# Patient Record
Sex: MALE | Race: BLACK OR AFRICAN AMERICAN | NOT HISPANIC OR LATINO | Employment: UNEMPLOYED | URBAN - METROPOLITAN AREA
[De-identification: names, ages, dates, MRNs, and addresses within clinical notes are randomized per-mention and may not be internally consistent; named-entity substitution may affect disease eponyms.]

---

## 2017-01-24 RX ORDER — PRAVASTATIN SODIUM 20 MG/1
TABLET ORAL
Qty: 30 TABLET | Refills: 12 | Status: SHIPPED | OUTPATIENT
Start: 2017-01-24 | End: 2018-01-24 | Stop reason: SDUPTHER

## 2017-01-24 RX ORDER — AMLODIPINE BESYLATE 10 MG/1
TABLET ORAL
Qty: 30 TABLET | Refills: 12 | Status: SHIPPED | OUTPATIENT
Start: 2017-01-24 | End: 2018-01-24 | Stop reason: SDUPTHER

## 2017-07-14 RX ORDER — VALSARTAN AND HYDROCHLOROTHIAZIDE 320; 25 MG/1; MG/1
TABLET, FILM COATED ORAL
Qty: 30 TABLET | Refills: 2 | Status: SHIPPED | OUTPATIENT
Start: 2017-07-14 | End: 2017-10-12 | Stop reason: SDUPTHER

## 2017-10-13 RX ORDER — VALSARTAN AND HYDROCHLOROTHIAZIDE 320; 25 MG/1; MG/1
TABLET, FILM COATED ORAL
Qty: 30 TABLET | Refills: 1 | Status: SHIPPED | OUTPATIENT
Start: 2017-10-13 | End: 2017-12-21 | Stop reason: SDUPTHER

## 2017-11-30 ENCOUNTER — OFFICE VISIT (OUTPATIENT)
Dept: DERMATOLOGY | Facility: CLINIC | Age: 42
End: 2017-11-30
Payer: COMMERCIAL

## 2017-11-30 DIAGNOSIS — D23.9 DERMATOFIBROMA: ICD-10-CM

## 2017-11-30 DIAGNOSIS — L73.0 ACNE KELOIDALIS NUCHAE: Primary | ICD-10-CM

## 2017-11-30 DIAGNOSIS — L30.9 DERMATITIS: ICD-10-CM

## 2017-11-30 PROCEDURE — 99999 PR PBB SHADOW E&M-EST. PATIENT-LVL II: CPT | Mod: PBBFAC,,, | Performed by: DERMATOLOGY

## 2017-11-30 PROCEDURE — 11901 INJECT SKIN LESIONS >7: CPT | Mod: S$GLB,,, | Performed by: DERMATOLOGY

## 2017-11-30 PROCEDURE — 99202 OFFICE O/P NEW SF 15 MIN: CPT | Mod: 25,S$GLB,, | Performed by: DERMATOLOGY

## 2017-11-30 RX ORDER — FLUOCINONIDE TOPICAL SOLUTION USP, 0.05% 0.5 MG/ML
SOLUTION TOPICAL
Qty: 60 ML | Refills: 3 | Status: SHIPPED | OUTPATIENT
Start: 2017-11-30 | End: 2020-04-14

## 2017-11-30 RX ORDER — DOXYCYCLINE HYCLATE 150 MG/1
TABLET, DELAYED RELEASE ORAL
Qty: 30 TABLET | Refills: 2 | Status: SHIPPED | OUTPATIENT
Start: 2017-11-30 | End: 2019-03-13

## 2017-11-30 NOTE — PROGRESS NOTES
Subjective:       Patient ID:  Deepak Tobias is a 42 y.o. male who presents for No chief complaint on file.    Pt present today for f/u of Acne Keloidalis. Last OV was 5 years ago and tx with ILK.  Was doing well and now flaring. Currently using no tx.   Pt c/o of rash on susan elbows and R hand x years. Itchy. Tx with coconut oil. Pt states it helps.        Review of Systems   Constitutional: Positive for fever. Negative for chills, fatigue and malaise.   Respiratory: Negative for cough.    Skin: Positive for itching and rash.   Hematologic/Lymphatic: Negative for adenopathy.   Allergic/Immunologic: Negative for environmental allergies.        Objective:    Physical Exam   Constitutional: He appears well-developed and well-nourished. No distress.   Neurological: He is alert and oriented to person, place, and time. He is not disoriented.   Psychiatric: He has a normal mood and affect.   Skin:   Areas Examined (abnormalities noted in diagram):   Scalp / Hair Palpated and Inspected  Head / Face Inspection Performed  RUE Inspected  LUE Inspection Performed  Nails and Digits Inspection Performed                       Diagram Legend     Erythematous scaling macule/papule c/w actinic keratosis       Vascular papule c/w angioma      Pigmented verrucoid papule/plaque c/w seborrheic keratosis      Yellow umbilicated papule c/w sebaceous hyperplasia      Irregularly shaped tan macule c/w lentigo     1-2 mm smooth white papules consistent with Milia      Movable subcutaneous cyst with punctum c/w epidermal inclusion cyst      Subcutaneous movable cyst c/w pilar cyst      Firm pink to brown papule c/w dermatofibroma      Pedunculated fleshy papule(s) c/w skin tag(s)      Evenly pigmented macule c/w junctional nevus     Mildly variegated pigmented, slightly irregular-bordered macule c/w mildly atypical nevus      Flesh colored to evenly pigmented papule c/w intradermal nevus       Pink pearly papule/plaque c/w basal cell  carcinoma      Erythematous hyperkeratotic cursted plaque c/w SCC      Surgical scar with no sign of skin cancer recurrence      Open and closed comedones      Inflammatory papules and pustules      Verrucoid papule consistent consistent with wart     Erythematous eczematous patches and plaques     Dystrophic onycholytic nail with subungual debris c/w onychomycosis     Umbilicated papule    Erythematous-base heme-crusted tan verrucoid plaque consistent with inflamed seborrheic keratosis     Erythematous Silvery Scaling Plaque c/w Psoriasis     See annotation      Assessment / Plan:        Acne keloidalis nuchae  -     fluocinonide (LIDEX) 0.05 % external solution; AAA scalp qd and can use on hand and elbows followed by moisturizer prn flare  Dispense: 60 mL; Refill: 3  Intralesional Kenalog 10mg/cc (1.0 cc total) injected into 12 lesions on the occipital scalp  today after obtaining verbal consent including risk of surrounding hypopigmentation. Patient tolerated procedure well.    NDC for Kenalog 10mg/cc:  9834-0629-05      doxycycline (DORYX) 150 MG EC tablet; Sig 1po qd with food and not within 1 hour of lying down  Dispense: 30 tablet; Refill: 2    Discussed benefits and risks of doxycyline therapy including but not limited to GI discomfort, esophageal irritation/ulceration, and increased sun sensitivity. Patient was counseled to take medicine with meals and at least 1 hour before lying down.       Dermatitis  -     fluocinonide (LIDEX) 0.05 % external solution; AAA scalp qd and can use on hand and elbows followed by moisturizer prn flare  Dispense: 60 mL; Refill: 3    Dermatofibroma  Reassurance given to patient. No treatment is necessary.   Treatment of benign, asymptomatic lesions may be considered cosmetic.               Return in about 2 months (around 1/30/2018).

## 2017-12-21 RX ORDER — VALSARTAN AND HYDROCHLOROTHIAZIDE 320; 25 MG/1; MG/1
TABLET, FILM COATED ORAL
Qty: 30 TABLET | Refills: 0 | Status: SHIPPED | OUTPATIENT
Start: 2017-12-21 | End: 2018-01-22 | Stop reason: SDUPTHER

## 2018-01-22 RX ORDER — VALSARTAN AND HYDROCHLOROTHIAZIDE 320; 25 MG/1; MG/1
TABLET, FILM COATED ORAL
Qty: 30 TABLET | Refills: 0 | Status: SHIPPED | OUTPATIENT
Start: 2018-01-22 | End: 2018-02-19 | Stop reason: SDUPTHER

## 2018-01-24 RX ORDER — AMLODIPINE BESYLATE 10 MG/1
TABLET ORAL
Qty: 30 TABLET | Refills: 11 | Status: SHIPPED | OUTPATIENT
Start: 2018-01-24 | End: 2019-02-13 | Stop reason: SDUPTHER

## 2018-01-24 RX ORDER — PRAVASTATIN SODIUM 20 MG/1
TABLET ORAL
Qty: 30 TABLET | Refills: 11 | Status: SHIPPED | OUTPATIENT
Start: 2018-01-24 | End: 2019-02-13 | Stop reason: SDUPTHER

## 2018-01-31 ENCOUNTER — OFFICE VISIT (OUTPATIENT)
Dept: DERMATOLOGY | Facility: CLINIC | Age: 43
End: 2018-01-31
Payer: COMMERCIAL

## 2018-01-31 DIAGNOSIS — L73.0 ACNE KELOIDALIS NUCHAE: Primary | ICD-10-CM

## 2018-01-31 PROCEDURE — 99213 OFFICE O/P EST LOW 20 MIN: CPT | Mod: 25,S$GLB,, | Performed by: DERMATOLOGY

## 2018-01-31 PROCEDURE — 99999 PR PBB SHADOW E&M-EST. PATIENT-LVL II: CPT | Mod: PBBFAC,,, | Performed by: DERMATOLOGY

## 2018-01-31 PROCEDURE — 11900 INJECT SKIN LESIONS </W 7: CPT | Mod: S$GLB,,, | Performed by: DERMATOLOGY

## 2018-01-31 RX ORDER — CLINDAMYCIN PHOSPHATE AND TRETINOIN GEL 1.2%/0.025% 10; .25 MG/G; MG/G
GEL TOPICAL NIGHTLY
Qty: 60 G | Refills: 2 | Status: SHIPPED | OUTPATIENT
Start: 2018-01-31 | End: 2018-03-26 | Stop reason: SDUPTHER

## 2018-01-31 NOTE — PROGRESS NOTES
Subjective:       Patient ID:  Deepak Tobias is a 42 y.o. male who presents for   Chief Complaint   Patient presents with    Keloid     follow up     Patient presents today for follow up of Acne Keloidalis.  He reports that it is improved, no new flares, treated with Lidex solution and Doxycycline. Has been on doxycycline about 2 months.  Did have a 2 weeks break in between      Keloid         Review of Systems   Constitutional: Negative for fever, chills, weight loss, weight gain, fatigue, night sweats and malaise.   Gastrointestinal: Negative for indigestion and Sensitivity to oral antibiotics.   Skin: Positive for rash and tendency to form keloidal scars. Negative for itching, sun sensitivity, daily sunscreen use and recent sunburn.   Hematologic/Lymphatic: Does not bruise/bleed easily.        Objective:    Physical Exam   Constitutional: He appears well-developed and well-nourished. No distress.   Neurological: He is alert and oriented to person, place, and time. He is not disoriented.   Psychiatric: He has a normal mood and affect.   Skin:   Areas Examined (abnormalities noted in diagram):   Scalp / Hair Palpated and Inspected              Diagram Legend     Erythematous scaling macule/papule c/w actinic keratosis       Vascular papule c/w angioma      Pigmented verrucoid papule/plaque c/w seborrheic keratosis      Yellow umbilicated papule c/w sebaceous hyperplasia      Irregularly shaped tan macule c/w lentigo     1-2 mm smooth white papules consistent with Milia      Movable subcutaneous cyst with punctum c/w epidermal inclusion cyst      Subcutaneous movable cyst c/w pilar cyst      Firm pink to brown papule c/w dermatofibroma      Pedunculated fleshy papule(s) c/w skin tag(s)      Evenly pigmented macule c/w junctional nevus     Mildly variegated pigmented, slightly irregular-bordered macule c/w mildly atypical nevus      Flesh colored to evenly pigmented papule c/w intradermal nevus       Pink  pearly papule/plaque c/w basal cell carcinoma      Erythematous hyperkeratotic cursted plaque c/w SCC      Surgical scar with no sign of skin cancer recurrence      Open and closed comedones      Inflammatory papules and pustules      Verrucoid papule consistent consistent with wart     Erythematous eczematous patches and plaques     Dystrophic onycholytic nail with subungual debris c/w onychomycosis     Umbilicated papule    Erythematous-base heme-crusted tan verrucoid plaque consistent with inflamed seborrheic keratosis     Erythematous Silvery Scaling Plaque c/w Psoriasis     See annotation      Assessment / Plan:        Acne keloidalis nuchae  -     clindamycin-tretinoin (ZIANA) gel; Apply topically nightly. Apply thin film to back of neck every other night  Dispense: 60 g; Refill: 2  D/c doxycycline after 2 more weeks  Continue lidex solution  And now alternate with ziana    Intralesional Kenalog 10mg/cc (0.7 cc total) injected into 5 lesions on the occipital scalp today after obtaining verbal consent including risk of surrounding hypopigmentation. Patient tolerated procedure well.      NDC for Kenalog 10mg/cc:  8372-8751-50                   Follow-up in about 2 months (around 3/31/2018).

## 2018-02-01 ENCOUNTER — PATIENT MESSAGE (OUTPATIENT)
Dept: DERMATOLOGY | Facility: CLINIC | Age: 43
End: 2018-02-01

## 2018-02-01 RX ORDER — CLINDAMYCIN PHOSPHATE AND TRETINOIN GEL 1.2%/0.025% 10; .25 MG/G; MG/G
GEL TOPICAL NIGHTLY
Qty: 60 G | Refills: 2 | Status: SHIPPED | OUTPATIENT
Start: 2018-02-01 | End: 2019-03-13

## 2018-02-06 ENCOUNTER — OFFICE VISIT (OUTPATIENT)
Dept: INTERNAL MEDICINE | Facility: CLINIC | Age: 43
End: 2018-02-06
Payer: COMMERCIAL

## 2018-02-06 VITALS
DIASTOLIC BLOOD PRESSURE: 84 MMHG | WEIGHT: 279.75 LBS | BODY MASS INDEX: 42.4 KG/M2 | HEART RATE: 57 BPM | HEIGHT: 68 IN | SYSTOLIC BLOOD PRESSURE: 122 MMHG

## 2018-02-06 DIAGNOSIS — E78.5 HYPERLIPIDEMIA, UNSPECIFIED HYPERLIPIDEMIA TYPE: ICD-10-CM

## 2018-02-06 DIAGNOSIS — G47.30 SLEEP APNEA, UNSPECIFIED TYPE: Primary | ICD-10-CM

## 2018-02-06 DIAGNOSIS — E11.9 TYPE 2 DIABETES MELLITUS WITHOUT COMPLICATION, WITHOUT LONG-TERM CURRENT USE OF INSULIN: ICD-10-CM

## 2018-02-06 DIAGNOSIS — I10 ESSENTIAL HYPERTENSION: ICD-10-CM

## 2018-02-06 PROCEDURE — 3008F BODY MASS INDEX DOCD: CPT | Mod: S$GLB,,, | Performed by: INTERNAL MEDICINE

## 2018-02-06 PROCEDURE — 99214 OFFICE O/P EST MOD 30 MIN: CPT | Mod: S$GLB,,, | Performed by: INTERNAL MEDICINE

## 2018-02-06 PROCEDURE — 99999 PR PBB SHADOW E&M-EST. PATIENT-LVL III: CPT | Mod: PBBFAC,,, | Performed by: INTERNAL MEDICINE

## 2018-02-06 NOTE — PROGRESS NOTES
Subjective     Chief Complaint:    History of Present Illness:  Mr. Deepak Tobias is a 42 y.o. male with HTN on Diovan and Norvasc, HLD on Pravastatin, SUPRIYA on CPAP who is here for yearly follow up. Patient is dong well. HTN optimal with current regimen. /84 in office today. No symptoms, no sob, LOAIZA, chest pain, palpitations, LE edema. HLD, last lipids unremarkable. Continue Pravastatin. Will get lipids panel today. Patient still uses CPAP, would like to get a new one.  He has been on CPAP since 2006. Works well for him. He otherwise has no other complaints. Does not smoke, occasional alcohol use.      Review of Systems   Constitutional: Negative for chills and fever.   HENT: Negative for congestion, sinus pain and sore throat.    Eyes: Negative for blurred vision and double vision.   Respiratory: Negative for cough, shortness of breath and wheezing.    Cardiovascular: Negative for chest pain, palpitations and leg swelling.   Gastrointestinal: Negative for abdominal pain, blood in stool, constipation and diarrhea.   Genitourinary: Negative for frequency, hematuria and urgency.   Musculoskeletal: Negative for back pain, myalgias and neck pain.   Skin: Negative for itching and rash.   Neurological: Negative for dizziness, focal weakness, seizures, weakness and headaches.       PAST HISTORY:     Past Medical History:   Diagnosis Date    Hyperlipidemia     Hypertension     Obesity     Sleep apnea        Past Surgical History:   Procedure Laterality Date    achilies tendon         Family History   Problem Relation Age of Onset    Hypertension Father     Acne Neg Hx     Melanoma Neg Hx        Social History     Social History    Marital status:      Spouse name: N/A    Number of children: N/A    Years of education: N/A     Occupational History     Clear Result     Social History Main Topics    Smoking status: Never Smoker    Smokeless tobacco: Never Used    Alcohol use No    Drug use: No     "Sexual activity: Yes     Partners: Female     Other Topics Concern    None     Social History Narrative    None       MEDICATIONS & ALLERGIES:     Current Outpatient Prescriptions on File Prior to Visit   Medication Sig    amLODIPine (NORVASC) 10 MG tablet TAKE ONE TABLET BY MOUTH DAILY    doxycycline (DORYX) 150 MG EC tablet Sig 1po qd with food and not within 1 hour of lying down    fluocinonide (LIDEX) 0.05 % external solution AAA scalp qd and can use on hand and elbows followed by moisturizer prn flare    pravastatin (PRAVACHOL) 20 MG tablet TAKE ONE TABLET BY MOUTH DAILY    valsartan-hydrochlorothiazide (DIOVAN-HCT) 320-25 mg per tablet TAKE ONE TABLET BY MOUTH DAILY    clindamycin-tretinoin (ZIANA) gel Apply topically nightly. Apply thin film to back of neck every other night    clindamycin-tretinoin (ZIANA) gel Apply topically nightly. Apply thin film to neck qhs     No current facility-administered medications on file prior to visit.        Review of patient's allergies indicates:   Allergen Reactions    Keflex [cephalexin] Rash       OBJECTIVE:     Vital Signs:  Vitals:    02/06/18 0904   BP: 122/84   BP Location: Right arm   Patient Position: Sitting   BP Method: Large (Manual)   Pulse: (!) 57   Weight: 126.9 kg (279 lb 12.2 oz)   Height: 5' 8" (1.727 m)       Body mass index is 42.54 kg/m².     Physical Exam:  General:  Well developed, well nourished, no acute distress  Head: Normocephalic, atraumatic  Eyes: PERRL, EOMI, clear sclera  Throat: No posterior pharyngeal erythema or exudate, no tonsillar exudate  Neck: supple, normal ROM, no thyromegaly   CVS:  RRR, S1 and S2 normal, no murmurs, rubs, gallops, 2+ peripheral pulses  Resp:  Lungs clear to auscultation, no wheezes, rales, rhonchi, cough  GI:  Abdomen soft, non-tender, non-distended, normoactive bowel sounds  MSK:  No muscle atrophy, cyanosis, peripheral edema   Skin:  No rashes, ulcers, erythema  Neuro:  CNII-XII grossly intact, no " focal deficits noted  Psych:  Appropriate mood and affect, normal judgement    Laboratory  No results found for: WBC, HGB, HCT, MCV, PLT  @BDITCCKNP56(GLU,NA,K,Cl,CO2,BUN,Creatinine,Calcium,MG)@  No results found for: INR, PROTIME  Lab Results   Component Value Date    HGBA1C 5.6 08/04/2016     No results for input(s): POCTGLUCOSE in the last 72 hours.    ASSESSMENT & PLAN:   Mr. Deepak Tobias is a 42 y.o. male    Deepak was seen today for medicare awv follow up.    Diagnoses and all orders for this visit:    Sleep apnea, unspecified type  -     CPAP FOR HOME USE  -     No LE edema, no headaches. C/W cpap  -     Re-assess for sleep apnea, f/u with sleep study clinic     Type 2 diabetes mellitus without complication, without long-term current use of insulin  -     Comprehensive metabolic panel; Future  -     Hemoglobin A1c; Future  -     Impaired fasting glucose on last lab, recent A1c 5.6. No macro/microvascular complications.    Essential hypertension        -     Well controlled on Diovan and Norvasc        -     In the 120's today, c/w current regimen     Hyperlipidemia, unspecified hyperlipidemia type  -     Lipid panel; Future  -     ASCVD score 5.5%, will check lipids. Obesity BMI 42.54    Class 3 obesity with body mass index (BMI) of 40.0 to 44.9 in adult, unspecified obesity type, unspecified whether serious comorbidity present        -      BMI 42.5.         -      Encouraging exercise and healthy diet.     RTC in 8 months.    Discussed with Dr. Alexander - staff attestation to follow

## 2018-02-06 NOTE — PROGRESS NOTES
I have personally taken the history and examined this patient and agree with the resident's note as stated above with the following thoughts:    Has had sleep apnea for 10 years on CPAP. Will order replacement cpap- his study is 10 year old- so we may need to have  Will follow up with Sleep clinic to evaluate since he has lost some weight and get new device.

## 2018-02-20 RX ORDER — VALSARTAN AND HYDROCHLOROTHIAZIDE 320; 25 MG/1; MG/1
TABLET, FILM COATED ORAL
Qty: 90 TABLET | Refills: 3 | Status: SHIPPED | OUTPATIENT
Start: 2018-02-20 | End: 2019-02-13 | Stop reason: SDUPTHER

## 2018-02-27 ENCOUNTER — TELEPHONE (OUTPATIENT)
Dept: INTERNAL MEDICINE | Facility: CLINIC | Age: 43
End: 2018-02-27

## 2018-02-27 NOTE — TELEPHONE ENCOUNTER
----- Message from Keesha Liang sent at 2/26/2018  4:19 PM CST -----  Contact: Branch/Wife/[phone]  Pt's wife is calling to speak with someone in the office. Please call and advise.        Thank You

## 2018-02-27 NOTE — TELEPHONE ENCOUNTER
----- Message from Lisa Elmore sent at 2/27/2018  3:11 PM CST -----  Contact: Spouse [phone]  Patient is returning a missed call.    Please call and advise.    Thank you

## 2018-03-26 ENCOUNTER — OFFICE VISIT (OUTPATIENT)
Dept: DERMATOLOGY | Facility: CLINIC | Age: 43
End: 2018-03-26
Payer: COMMERCIAL

## 2018-03-26 DIAGNOSIS — L73.0 ACNE KELOIDALIS NUCHAE: ICD-10-CM

## 2018-03-26 DIAGNOSIS — L30.9 DERMATITIS: Primary | ICD-10-CM

## 2018-03-26 PROCEDURE — 99213 OFFICE O/P EST LOW 20 MIN: CPT | Mod: S$GLB,,, | Performed by: DERMATOLOGY

## 2018-03-26 PROCEDURE — 99999 PR PBB SHADOW E&M-EST. PATIENT-LVL II: CPT | Mod: PBBFAC,,, | Performed by: DERMATOLOGY

## 2018-03-26 RX ORDER — CLINDAMYCIN PHOSPHATE AND TRETINOIN GEL 1.2%/0.025% 10; .25 MG/G; MG/G
GEL TOPICAL NIGHTLY
Qty: 60 G | Refills: 2 | Status: SHIPPED | OUTPATIENT
Start: 2018-03-26 | End: 2019-03-13

## 2018-03-26 NOTE — PROGRESS NOTES
Subjective:       Patient ID:  Deepak Tobias is a 42 y.o. male who presents for   Chief Complaint   Patient presents with    Keloid     Pt here today for a follow up on Acne keloidalis nuchae tx with ziana gel, lidex solution and ILK 10 mm/cc. Tx help  Pt feels that when he gets new lesions they do go away more quickly.   Also has scaly area on right 5th knuckle.  Uses ziana and lidex solution.  Still dry and irritated.        Keloid         Review of Systems   Skin: Positive for tendency to form keloidal scars.   Hematologic/Lymphatic: Does not bruise/bleed easily.        Objective:    Physical Exam   Constitutional: He appears well-developed and well-nourished. No distress.   Neurological: He is alert and oriented to person, place, and time. He is not disoriented.   Psychiatric: He has a normal mood and affect.   Skin:   Areas Examined (abnormalities noted in diagram):   Scalp / Hair Palpated and Inspected  RUE Inspected                  Diagram Legend     Erythematous scaling macule/papule c/w actinic keratosis       Vascular papule c/w angioma      Pigmented verrucoid papule/plaque c/w seborrheic keratosis      Yellow umbilicated papule c/w sebaceous hyperplasia      Irregularly shaped tan macule c/w lentigo     1-2 mm smooth white papules consistent with Milia      Movable subcutaneous cyst with punctum c/w epidermal inclusion cyst      Subcutaneous movable cyst c/w pilar cyst      Firm pink to brown papule c/w dermatofibroma      Pedunculated fleshy papule(s) c/w skin tag(s)      Evenly pigmented macule c/w junctional nevus     Mildly variegated pigmented, slightly irregular-bordered macule c/w mildly atypical nevus      Flesh colored to evenly pigmented papule c/w intradermal nevus       Pink pearly papule/plaque c/w basal cell carcinoma      Erythematous hyperkeratotic cursted plaque c/w SCC      Surgical scar with no sign of skin cancer recurrence      Open and closed comedones      Inflammatory  papules and pustules      Verrucoid papule consistent consistent with wart     Erythematous eczematous patches and plaques     Dystrophic onycholytic nail with subungual debris c/w onychomycosis     Umbilicated papule    Erythematous-base heme-crusted tan verrucoid plaque consistent with inflamed seborrheic keratosis     Erythematous Silvery Scaling Plaque c/w Psoriasis     See annotation      Assessment / Plan:        Dermatitis  Lidex solution then moisturizing cream     Acne keloidalis nuchae  Lidex and ziana gel to periphery of scar   Will call in oral doxy prn flare.   -     clindamycin-tretinoin (ZIANA) gel; Apply topically nightly. Apply thin film to back of neck every other night  Dispense: 60 g; Refill: 2             Follow-up if symptoms worsen or fail to improve.

## 2018-03-28 DIAGNOSIS — E11.9 TYPE 2 DIABETES MELLITUS WITHOUT COMPLICATION: ICD-10-CM

## 2018-06-14 ENCOUNTER — TELEPHONE (OUTPATIENT)
Dept: INTERNAL MEDICINE | Facility: CLINIC | Age: 43
End: 2018-06-14

## 2018-06-14 NOTE — TELEPHONE ENCOUNTER
----- Message from Kurt Jain sent at 6/14/2018  3:41 PM CDT -----  Contact: Spouse/Bree [phone]  The patient's wife said that [facility] need a copy of the sleep study to go along with the CPAP prescription that you sent to them.    Thank you

## 2018-07-26 RX ORDER — FLUOCINONIDE TOPICAL SOLUTION USP, 0.05% 0.5 MG/ML
SOLUTION TOPICAL
Qty: 60 ML | Refills: 2 | Status: SHIPPED | OUTPATIENT
Start: 2018-07-26 | End: 2019-03-13

## 2018-11-30 ENCOUNTER — TELEPHONE (OUTPATIENT)
Dept: INTERNAL MEDICINE | Facility: CLINIC | Age: 43
End: 2018-11-30

## 2018-11-30 NOTE — TELEPHONE ENCOUNTER
----- Message from Pratibha Mckeon sent at 11/30/2018  2:24 PM CST -----  Contact: [facility]/ [phone]  Company calling to speak with someone in regards to some orders that were sent over for a C-Pap machine and supplies. She states that it was sent over on 11/20/18. Please call back and advise.      Thanks

## 2018-12-03 ENCOUNTER — TELEPHONE (OUTPATIENT)
Dept: INTERNAL MEDICINE | Facility: CLINIC | Age: 43
End: 2018-12-03

## 2018-12-03 NOTE — TELEPHONE ENCOUNTER
----- Message from Santana Caceres sent at 12/3/2018  2:59 PM CST -----  Contact: Marco Antonio Sweet [phone]  3rd Attempt    Marco Antonio Chacko calling to check the status of the previous messages, requesting for a response back from office.    Stating is urgent and is date specific, stating third attempt, would like to assist patient with getting the equipment and supplies that's needed before request closes.    Please call an advise  Thank you

## 2018-12-06 ENCOUNTER — TELEPHONE (OUTPATIENT)
Dept: INTERNAL MEDICINE | Facility: CLINIC | Age: 43
End: 2018-12-06

## 2018-12-06 NOTE — TELEPHONE ENCOUNTER
----- Message from Mariah Pinto sent at 12/6/2018 10:49 AM CST -----  Contact: Marco Antonio Steen [phone]  Pharmacy is calling to clarify an RX.  RX name:  CPAP Machine  What do they need to clarify:  CPAP Settings  Comments: Caller states they received 2 orders/ scripts with different settings.

## 2019-02-15 RX ORDER — VALSARTAN AND HYDROCHLOROTHIAZIDE 320; 25 MG/1; MG/1
TABLET, FILM COATED ORAL
Qty: 30 TABLET | Refills: 2 | Status: SHIPPED | OUTPATIENT
Start: 2019-02-15 | End: 2019-06-01 | Stop reason: SDUPTHER

## 2019-02-15 RX ORDER — PRAVASTATIN SODIUM 20 MG/1
TABLET ORAL
Qty: 30 TABLET | Refills: 2 | Status: SHIPPED | OUTPATIENT
Start: 2019-02-15 | End: 2019-05-19 | Stop reason: SDUPTHER

## 2019-02-15 RX ORDER — AMLODIPINE BESYLATE 10 MG/1
TABLET ORAL
Qty: 30 TABLET | Refills: 2 | Status: SHIPPED | OUTPATIENT
Start: 2019-02-15 | End: 2019-05-19 | Stop reason: SDUPTHER

## 2019-03-13 ENCOUNTER — OFFICE VISIT (OUTPATIENT)
Dept: INTERNAL MEDICINE | Facility: CLINIC | Age: 44
End: 2019-03-13
Payer: COMMERCIAL

## 2019-03-13 ENCOUNTER — LAB VISIT (OUTPATIENT)
Dept: LAB | Facility: HOSPITAL | Age: 44
End: 2019-03-13
Attending: INTERNAL MEDICINE
Payer: COMMERCIAL

## 2019-03-13 VITALS
BODY MASS INDEX: 42.7 KG/M2 | OXYGEN SATURATION: 98 % | HEART RATE: 65 BPM | SYSTOLIC BLOOD PRESSURE: 118 MMHG | WEIGHT: 281.75 LBS | HEIGHT: 68 IN | DIASTOLIC BLOOD PRESSURE: 78 MMHG

## 2019-03-13 DIAGNOSIS — E11.9 TYPE 2 DIABETES MELLITUS WITHOUT COMPLICATION, WITHOUT LONG-TERM CURRENT USE OF INSULIN: Primary | ICD-10-CM

## 2019-03-13 DIAGNOSIS — E11.9 TYPE 2 DIABETES MELLITUS WITHOUT COMPLICATION, WITHOUT LONG-TERM CURRENT USE OF INSULIN: ICD-10-CM

## 2019-03-13 DIAGNOSIS — E78.5 HYPERLIPIDEMIA, UNSPECIFIED HYPERLIPIDEMIA TYPE: ICD-10-CM

## 2019-03-13 DIAGNOSIS — I10 ESSENTIAL HYPERTENSION: ICD-10-CM

## 2019-03-13 DIAGNOSIS — G47.30 SLEEP APNEA, UNSPECIFIED TYPE: ICD-10-CM

## 2019-03-13 DIAGNOSIS — E66.01 CLASS 3 SEVERE OBESITY WITH SERIOUS COMORBIDITY AND BODY MASS INDEX (BMI) OF 40.0 TO 44.9 IN ADULT, UNSPECIFIED OBESITY TYPE: ICD-10-CM

## 2019-03-13 LAB
ALBUMIN SERPL BCP-MCNC: 4.2 G/DL
ALP SERPL-CCNC: 51 U/L
ALT SERPL W/O P-5'-P-CCNC: 33 U/L
ANION GAP SERPL CALC-SCNC: 9 MMOL/L
AST SERPL-CCNC: 29 U/L
BILIRUB SERPL-MCNC: 0.8 MG/DL
BUN SERPL-MCNC: 16 MG/DL
CALCIUM SERPL-MCNC: 9.9 MG/DL
CHLORIDE SERPL-SCNC: 103 MMOL/L
CHOLEST SERPL-MCNC: 195 MG/DL
CHOLEST/HDLC SERPL: 4.9 {RATIO}
CO2 SERPL-SCNC: 29 MMOL/L
CREAT SERPL-MCNC: 1.3 MG/DL
EST. GFR  (AFRICAN AMERICAN): >60 ML/MIN/1.73 M^2
EST. GFR  (NON AFRICAN AMERICAN): >60 ML/MIN/1.73 M^2
ESTIMATED AVG GLUCOSE: 148 MG/DL
GLUCOSE SERPL-MCNC: 162 MG/DL
HBA1C MFR BLD HPLC: 6.8 %
HDLC SERPL-MCNC: 40 MG/DL
HDLC SERPL: 20.5 %
LDLC SERPL CALC-MCNC: 126.2 MG/DL
NONHDLC SERPL-MCNC: 155 MG/DL
POTASSIUM SERPL-SCNC: 3.5 MMOL/L
PROT SERPL-MCNC: 7.8 G/DL
SODIUM SERPL-SCNC: 141 MMOL/L
TRIGL SERPL-MCNC: 144 MG/DL

## 2019-03-13 PROCEDURE — 83036 HEMOGLOBIN GLYCOSYLATED A1C: CPT

## 2019-03-13 PROCEDURE — 3078F PR MOST RECENT DIASTOLIC BLOOD PRESSURE < 80 MM HG: ICD-10-PCS | Mod: CPTII,S$GLB,, | Performed by: INTERNAL MEDICINE

## 2019-03-13 PROCEDURE — 3008F BODY MASS INDEX DOCD: CPT | Mod: CPTII,S$GLB,, | Performed by: INTERNAL MEDICINE

## 2019-03-13 PROCEDURE — 80053 COMPREHEN METABOLIC PANEL: CPT

## 2019-03-13 PROCEDURE — 80061 LIPID PANEL: CPT

## 2019-03-13 PROCEDURE — 3044F PR MOST RECENT HEMOGLOBIN A1C LEVEL <7.0%: ICD-10-PCS | Mod: CPTII,S$GLB,, | Performed by: INTERNAL MEDICINE

## 2019-03-13 PROCEDURE — 3074F PR MOST RECENT SYSTOLIC BLOOD PRESSURE < 130 MM HG: ICD-10-PCS | Mod: CPTII,S$GLB,, | Performed by: INTERNAL MEDICINE

## 2019-03-13 PROCEDURE — 3074F SYST BP LT 130 MM HG: CPT | Mod: CPTII,S$GLB,, | Performed by: INTERNAL MEDICINE

## 2019-03-13 PROCEDURE — 3078F DIAST BP <80 MM HG: CPT | Mod: CPTII,S$GLB,, | Performed by: INTERNAL MEDICINE

## 2019-03-13 PROCEDURE — 36415 COLL VENOUS BLD VENIPUNCTURE: CPT

## 2019-03-13 PROCEDURE — 3044F HG A1C LEVEL LT 7.0%: CPT | Mod: CPTII,S$GLB,, | Performed by: INTERNAL MEDICINE

## 2019-03-13 PROCEDURE — 99214 PR OFFICE/OUTPT VISIT, EST, LEVL IV, 30-39 MIN: ICD-10-PCS | Mod: S$GLB,,, | Performed by: INTERNAL MEDICINE

## 2019-03-13 PROCEDURE — 99214 OFFICE O/P EST MOD 30 MIN: CPT | Mod: S$GLB,,, | Performed by: INTERNAL MEDICINE

## 2019-03-13 PROCEDURE — 99999 PR PBB SHADOW E&M-EST. PATIENT-LVL IV: ICD-10-PCS | Mod: PBBFAC,,, | Performed by: INTERNAL MEDICINE

## 2019-03-13 PROCEDURE — 3008F PR BODY MASS INDEX (BMI) DOCUMENTED: ICD-10-PCS | Mod: CPTII,S$GLB,, | Performed by: INTERNAL MEDICINE

## 2019-03-13 PROCEDURE — 99999 PR PBB SHADOW E&M-EST. PATIENT-LVL IV: CPT | Mod: PBBFAC,,, | Performed by: INTERNAL MEDICINE

## 2019-03-13 NOTE — PROGRESS NOTES
"Subjective:  HISTORY OF PRESENT ILLNESS:    Mr. Tobias is a 42 YO male here for a follow up appointment.    Hyperlipidemia:   Hyperlipedima controlled, patient is adherent to Pravastatin, last lab 2/6/18 showed it was well controlled. Patient stated no side effects from medication.    Hypertension:  BP controlled, patient adherent to valsartan-hydrochlorothiazide and amlodipine. Patient stated no side effects from medication.    Obesity:  Patient weight 282 lb , 1 year ago 280 lb. ,BMI 42.84    Sleep Apnea:  Received new Machine in December, is using machine with no complaints.    Type 2 Diabetes:  Need to check A1C lab values, last from 2/6/18 was 5.9.  Performed diabetic foot exam and referred for eye exam. Patient stated no side effects from medication.    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     For all the issues above Patient has made change to increase exercise since January, and a diet of less meats and carbs, more fruits and vegetables.     Review of Systems   Constitutional: Negative for chills, fever, malaise/fatigue and weight loss.   Respiratory: Negative.  Negative for cough, hemoptysis, shortness of breath and wheezing.    Cardiovascular: Negative for chest pain, palpitations, orthopnea, claudication and leg swelling.   Gastrointestinal: Negative for constipation, diarrhea and nausea.   Genitourinary: Negative for dysuria, frequency, hematuria and urgency.   Neurological: Negative for weakness.     Objective:  /78 (BP Location: Right arm, Patient Position: Sitting, BP Method: Large (Manual))   Pulse 65   Ht 5' 8" (1.727 m)   Wt 127.8 kg (281 lb 12 oz)   SpO2 98%   BMI 42.84 kg/m²     PHYSICAL EXAMINATION:  GENERAL:  He is a well-appearing obese -American gentleman in no acute   distress.  NECK:  Supple.  CHEST:  Clear without wheeze.  CARDIOVASCULAR:  S1 and S2, regular rate and " rhythm without murmur, gallop or   rub.  ABDOMEN:  Soft, nontender, no hepatosplenomegaly, no guarding or rebound   tenderness.  LOWER EXTREMITIES:  No edema. Dorsalis pedis pulse, strong, bilateral, sensation present to monofilament.   HEENT:   Mild gingival hyperplasia at the bottom of his teeth.       Current Outpatient Medications:     amLODIPine (NORVASC) 10 MG tablet, TAKE ONE TABLET BY MOUTH DAILY, Disp: 30 tablet, Rfl: 2    fluocinonide (LIDEX) 0.05 % external solution, AAA scalp qd and can use on hand and elbows followed by moisturizer prn flare, Disp: 60 mL, Rfl: 3    pravastatin (PRAVACHOL) 20 MG tablet, TAKE ONE TABLET BY MOUTH DAILY, Disp: 30 tablet, Rfl: 2    valsartan-hydrochlorothiazide (DIOVAN-HCT) 320-25 mg per tablet, TAKE ONE TABLET BY MOUTH DAILY, Disp: 30 tablet, Rfl: 2      ASSESSMENT AND PLAN:      Hyperlipidemia:   Hyperlipedima controlled, patient is adherent to Pravastatin. Follow up with labs from today and again in 6 months.     Hypertension:  BP controlled, patient adherent to valsartan-hydrochlorothiazide and amlodipine. Follow up with labs from today and again in 6 months.     Obesity:  Patient weight 282 lb , 1 year ago 280 lb. ,BMI 42.84. Continue to monitor as patient continues to exercise and diet, offer a referral to a dietitian or  if necessary at next appointment.     Sleep Apnea:  Received new Machine in December, is using machine with no complaints. Ask at follow up appointment if any changes.    Type 2 Diabetes:  Need to check A1C lab values, last from 2/6/18 was 5.9. Follow up with labs from today and again in 6 months. Follow up if patient had eye exam.    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 Take a picture for records to compare in the next 6 months, continue to monitor.     Admin:  Patient mentioned last year sister was diagnosed with intestinal cancer.    Other family history revealed father has leukemia, mother has sarcoidosis, brother has b12 deficiency.  Update in chart

## 2019-03-13 NOTE — MEDICAL/APP STUDENT
"Subjective:  HISTORY OF PRESENT ILLNESS:    Mr. Tobias is a 44 YO male here for a follow up appointment.    Hyperlipidemia:   Hyperlipedima controlled, patient is adherent to Pravastatin, last lab 2/6/18 showed it was well controlled. Patient stated no side effects from medication.    Hypertension:  BP controlled, patient adherent to valsartan-hydrochlorothiazide and amlodipine. Patient stated no side effects from medication.    Obesity:  Patient weight 282 lb , 1 year ago 280 lb. ,BMI 42.84    Sleep Apnea:  Received new Machine in December, is using machine with no complaints.    Type 2 Diabetes:  Need to check A1C lab values, last from 2/6/18 was 5.9.  Performed diabetic foot exam and referred for eye exam. Patient stated no side effects from medication.    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     For all the issues above Patient has made change to increase exercise since January, and a diet of less meats and carbs, more fruits and vegetables.     Review of Systems   Constitutional: Negative for chills, fever, malaise/fatigue and weight loss.   Respiratory: Negative.  Negative for cough, hemoptysis, shortness of breath and wheezing.    Cardiovascular: Negative for chest pain, palpitations, orthopnea, claudication and leg swelling.   Gastrointestinal: Negative for constipation, diarrhea and nausea.   Genitourinary: Negative for dysuria, frequency, hematuria and urgency.   Neurological: Negative for weakness.     Objective:  /78 (BP Location: Right arm, Patient Position: Sitting, BP Method: Large (Manual))   Pulse 65   Ht 5' 8" (1.727 m)   Wt 127.8 kg (281 lb 12 oz)   SpO2 98%   BMI 42.84 kg/m²     PHYSICAL EXAMINATION:  GENERAL:  He is a well-appearing obese -American gentleman in no acute   distress.  NECK:  Supple.  CHEST:  Clear without wheeze.  CARDIOVASCULAR:  S1 and S2, regular rate and " rhythm without murmur, gallop or   rub.  ABDOMEN:  Soft, nontender, no hepatosplenomegaly, no guarding or rebound   tenderness.  LOWER EXTREMITIES:  No edema. Dorsalis pedis pulse, strong, bilateral, sensation present to monofilament.   HEENT:   Mild gingival hyperplasia at the bottom of his teeth.       Current Outpatient Medications:     amLODIPine (NORVASC) 10 MG tablet, TAKE ONE TABLET BY MOUTH DAILY, Disp: 30 tablet, Rfl: 2    fluocinonide (LIDEX) 0.05 % external solution, AAA scalp qd and can use on hand and elbows followed by moisturizer prn flare, Disp: 60 mL, Rfl: 3    pravastatin (PRAVACHOL) 20 MG tablet, TAKE ONE TABLET BY MOUTH DAILY, Disp: 30 tablet, Rfl: 2    valsartan-hydrochlorothiazide (DIOVAN-HCT) 320-25 mg per tablet, TAKE ONE TABLET BY MOUTH DAILY, Disp: 30 tablet, Rfl: 2      ASSESSMENT AND PLAN:      Hyperlipidemia:   Hyperlipedima controlled, patient is adherent to Pravastatin. Follow up with labs from today and again in 6 months.     Hypertension:  BP controlled, patient adherent to valsartan-hydrochlorothiazide and amlodipine. Follow up with labs from today and again in 6 months.     Obesity:  Patient weight 282 lb , 1 year ago 280 lb. ,BMI 42.84. Continue to monitor as patient continues to exercise and diet, offer a referral to a dietitian or  if necessary at next appointment.     Sleep Apnea:  Received new Machine in December, is using machine with no complaints. Ask at follow up appointment if any changes.    Type 2 Diabetes:  Need to check A1C lab values, last from 2/6/18 was 5.9. Follow up with labs from today and again in 6 months. Follow up if patient had eye exam.    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 Take a picture for records to compare in the next 6 months, continue to monitor.     Admin:  Patient mentioned last year sister was diagnosed with intestinal cancer.    Other family history revealed father has leukemia, mother has sarcoidosis, brother has b12 deficiency.  Update in chart

## 2019-04-09 NOTE — PROGRESS NOTES
HISTORY OF PRESENT ILLNESS:  He is a 43-year-old gentleman coming in to followup   ongoing medical problems.  1.  He has hyperlipidemia.  He is taking pravastatin.  He has had hyperlipidemia   for several years.  His last labs were back in 2018 around February and he is   not having any trouble with the medication.  2.  He has hypertension, which he has also had for multiple years.  He is on   losartan and hydrochlorothiazide combination and amlodipine.  Blood pressure   today is 118/78.  He is not having headaches or chest pain.  No shortness of   breath.  He also suffers from obesity.  He is weighing 282 today.  BMI is 42.    He has also had some hyperglycemia just in the diabetic range.  Last hemoglobin   A1c was last year at 5.9.  He denies any polyuria or polydipsia.  Not taking any   medication for this.  It has been diet controlled.  3.  He has some gingival hyperplasia.  We had taken him off his amlodipine that   really did not seem to affect and we had trouble controlling his blood pressure.    He is back on amlodipine.  4.  Sleep apnea.  He is on CPAP, which he received late last year.  He is using   CPAP without any difficulty.    REVIEW OF SYSTEMS:  No chest pain, shortness of breath, palpitations, nausea,   vomiting, blurriness of vision.  No PND or orthopnea.    PHYSICAL EXAMINATION:  GENERAL:  He is a well-appearing 43-year-old obese -American gentleman in   no acute distress.  NECK:  Supple.  He has no JVD.  Thyroid is not enlarged.  CARDIOVASCULAR:  S1 and S2, regular rate and rhythm without murmur, gallop or   rub.  ABDOMEN:  Soft, obese, nontender, no hepatosplenomegaly.  EXTREMITIES:  Foot exam is documented by the medical student and reviewed by me.    ASSESSMENT:  1.  Hyperlipidemia.  We will check lipid panel.  Continue pravastatin.  I will   see him back in six months.  2.  Hypertension.  Blood pressure is well controlled.  Continue current   medications.  3.  Obesity.  We discussed  this with him.  We are going to work on a low-fat,   low-cholesterol diet and exercise.  4.  Sleep apnea, using a CPAP machine without any difficulty.  I asked him to   give me a call if he needs CPAP supplies and we can arrange that for him.  5.  Diabetes mellitus type 2, which has been diet controlled.  We are going to   check a chemistry, hemoglobin A1c, did a full exam today.  Also, we will set him   up to see an eye doctor.  We will follow him up again in six months depending   on how labs look.  If he has any problems before then, he is going to give me a   call.      ALEXIA  dd: 04/09/2019 08:01:33 (CDT)  td: 04/09/2019 22:43:27 (CDT)  Doc ID   #[id number]  Job ID #[id number]    CC:

## 2019-05-19 RX ORDER — PRAVASTATIN SODIUM 20 MG/1
TABLET ORAL
Qty: 90 TABLET | Refills: 3 | Status: SHIPPED | OUTPATIENT
Start: 2019-05-19 | End: 2020-05-26

## 2019-05-19 RX ORDER — AMLODIPINE BESYLATE 10 MG/1
TABLET ORAL
Qty: 90 TABLET | Refills: 3 | Status: SHIPPED | OUTPATIENT
Start: 2019-05-19 | End: 2020-05-26

## 2019-06-03 RX ORDER — VALSARTAN AND HYDROCHLOROTHIAZIDE 320; 25 MG/1; MG/1
TABLET, FILM COATED ORAL
Qty: 90 TABLET | Refills: 3 | Status: ON HOLD | OUTPATIENT
Start: 2019-06-03 | End: 2020-04-03 | Stop reason: SDUPTHER

## 2019-06-05 ENCOUNTER — TELEPHONE (OUTPATIENT)
Dept: INTERNAL MEDICINE | Facility: CLINIC | Age: 44
End: 2019-06-05

## 2019-06-05 NOTE — TELEPHONE ENCOUNTER
----- Message from Shanna Rivas sent at 6/5/2019 11:21 AM CDT -----  Contact: Ya/Marco Antonio Joe/[phone] ext [phone]  Pharmacist called in regards needing to talk with Dr Alexander medical assistant about getting face to face notes for his CPAP request. Please call and advise. Thank you

## 2019-06-10 ENCOUNTER — PATIENT MESSAGE (OUTPATIENT)
Dept: DERMATOLOGY | Facility: CLINIC | Age: 44
End: 2019-06-10

## 2019-10-03 DIAGNOSIS — E11.9 TYPE 2 DIABETES MELLITUS WITHOUT COMPLICATION: ICD-10-CM

## 2020-03-23 ENCOUNTER — PATIENT MESSAGE (OUTPATIENT)
Dept: INTERNAL MEDICINE | Facility: CLINIC | Age: 45
End: 2020-03-23

## 2020-03-23 ENCOUNTER — NURSE TRIAGE (OUTPATIENT)
Dept: ADMINISTRATIVE | Facility: CLINIC | Age: 45
End: 2020-03-23

## 2020-03-23 ENCOUNTER — TELEPHONE (OUTPATIENT)
Dept: INTERNAL MEDICINE | Facility: CLINIC | Age: 45
End: 2020-03-23

## 2020-03-23 NOTE — TELEPHONE ENCOUNTER
I put a referral for the covid monitoring system.  He has fever and a slight cough- so I expect Covid-  He is feeling pretty good- but I would like to monitor-- he will self quarantine  For 14 days.   NO SOB.

## 2020-03-23 NOTE — TELEPHONE ENCOUNTER
Patient reports he had a fever of 102 orally.  Patient took two tylenols about 1.5 hours ago. Patient reports a tickle in his lungs and a small cough. Patient reports a skin ache. Patient was advised per protocol and advises that he wants to be tested for COVID-19. Patient was offered anywhere care.    Reason for Disposition   Cold with no complications    Additional Information   Negative: Severe difficulty breathing (e.g., struggling for each breath, speaks in single words)   Negative: Sounds like a life-threatening emergency to the triager   Negative: Runny nose is caused by pollen or other allergies   Negative: Cough is main symptom   Negative: Severe sore throat   Negative: Fever > 104 F (40 C)   Negative: [1] Difficulty breathing AND [2] not severe AND [3] not from stuffy nose (e.g., not relieved by cleaning out the nose)   Negative: Patient sounds very sick or weak to the triager   Negative: [1] Fever > 101 F (38.3 C) AND [2] age > 60   Negative: [1] Fever > 100.0 F (37.8 C) AND [2] bedridden (e.g., nursing home patient, CVA, chronic illness, recovering from surgery)   Negative: [1] Fever > 100.0 F (37.8 C) AND [2] diabetes mellitus or weak immune system (e.g., HIV positive, cancer chemo, splenectomy, chronic steroids)   Negative: Fever present > 3 days (72 hours)   Negative: [1] Fever returns after gone for over 24 hours AND [2] symptoms worse or not improved   Negative: [1] Sinus pain (not just congestion) AND [2] fever   Negative: Earache   Negative: [1] SEVERE sore throat AND [2] present > 24 hours   Negative: [1] Sinus congestion (pressure, fullness) AND [2] present > 10 days   Negative: [1] Nasal discharge AND [2] present > 10 days   Negative: [1] Using nasal washes and pain medicine > 24 hours AND [2] sinus pain (lower forehead, cheekbone, or eye) persists   Negative: Sores with yellow scabs around the nasal opening    Protocols used: COMMON COLD-A-

## 2020-03-24 ENCOUNTER — CLINICAL SUPPORT (OUTPATIENT)
Dept: INTERNAL MEDICINE | Facility: CLINIC | Age: 45
End: 2020-03-24
Payer: COMMERCIAL

## 2020-03-24 ENCOUNTER — PATIENT MESSAGE (OUTPATIENT)
Dept: INTERNAL MEDICINE | Facility: CLINIC | Age: 45
End: 2020-03-24

## 2020-03-24 ENCOUNTER — TELEPHONE (OUTPATIENT)
Dept: INTERNAL MEDICINE | Facility: CLINIC | Age: 45
End: 2020-03-24

## 2020-03-24 DIAGNOSIS — Z20.822 SUSPECTED COVID-19 VIRUS INFECTION: ICD-10-CM

## 2020-03-24 DIAGNOSIS — Z20.822 SUSPECTED COVID-19 VIRUS INFECTION: Primary | ICD-10-CM

## 2020-03-24 PROCEDURE — U0002 COVID-19 LAB TEST NON-CDC: HCPCS

## 2020-03-24 NOTE — TELEPHONE ENCOUNTER
They have found a thermometer     Reason for Disposition   [1] Follow-up call to recent contact AND [2] information only call, no triage required    Additional Information   Negative: [1] Caller is not with the adult (patient) AND [2] reporting urgent symptoms   Negative: Lab result questions   Negative: Medication questions   Negative: Caller can't be reached by phone   Negative: Caller has already spoken to PCP or another triager   Negative: RN needs further essential information from caller in order to complete triage   Negative: Requesting regular office appointment   Negative: [1] Caller requesting NON-URGENT health information AND [2] PCP's office is the best resource   Negative: Health Information question, no triage required and triager able to answer question   Negative: General information question, no triage required and triager able to answer question   Negative: Question about upcoming scheduled test, no triage required and triager able to answer question   Negative: [1] Caller is not with the adult (patient) AND [2] probable NON-URGENT symptoms    Protocols used: INFORMATION ONLY CALL-A-

## 2020-03-26 ENCOUNTER — NURSE TRIAGE (OUTPATIENT)
Dept: ADMINISTRATIVE | Facility: CLINIC | Age: 45
End: 2020-03-26

## 2020-03-26 NOTE — TELEPHONE ENCOUNTER
No answer.      Reason for Disposition   Message left on identified voice mail    Protocols used: NO CONTACT OR DUPLICATE CONTACT CALL-A-AH

## 2020-03-27 ENCOUNTER — TELEPHONE (OUTPATIENT)
Dept: INTERNAL MEDICINE | Facility: CLINIC | Age: 45
End: 2020-03-27

## 2020-03-27 ENCOUNTER — NURSE TRIAGE (OUTPATIENT)
Dept: ADMINISTRATIVE | Facility: CLINIC | Age: 45
End: 2020-03-27

## 2020-03-27 LAB — SARS-COV-2 RNA RESP QL NAA+PROBE: DETECTED

## 2020-03-27 NOTE — TELEPHONE ENCOUNTER
Spoke with wife and informed her on medication  told pt that was in chart from phone call this morning.

## 2020-03-27 NOTE — TELEPHONE ENCOUNTER
Reason for Disposition   Message left on identified voice mail    Additional Information   Negative: Caller is angry or rude (e.g., hangs up, verbally abusive, yelling)   Negative: Caller hangs up   Negative: Caller has already spoken with the PCP and has no further questions.   Negative: Caller has already spoken with another triager and has no further questions.   Negative: Caller has already spoken with another triager or PCP AND has further questions AND triager able to answer questions.   Negative: Busy signal.  First attempt to contact caller.  Follow-up call scheduled within 15 minutes.   Negative: No answer.  First attempt to contact caller.  Follow-up call scheduled within 15 minutes.    Protocols used: NO CONTACT OR DUPLICATE CONTACT CALL-A-AH

## 2020-03-27 NOTE — TELEPHONE ENCOUNTER
----- Message from Shanna Rivas sent at 3/27/2020  8:52 AM CDT -----  Contact: Bree/Spouse/[phone]  Bree called stated that patient forgot the name of the medication and if is possible for PCP to call her that way she can write it down and get it for the patient. Thank you!

## 2020-03-27 NOTE — TELEPHONE ENCOUNTER
Alternative #-- [phone]    I spoke with him this morning.  His main issue some postnasal drip.  I recommend some Mucinex DM or some DayQuil/NyQuil.  We discussed good hygiene and when he could possibly return to work.  However he is currently not working due to the pandemic.

## 2020-03-29 ENCOUNTER — PATIENT MESSAGE (OUTPATIENT)
Dept: INTERNAL MEDICINE | Facility: CLINIC | Age: 45
End: 2020-03-29

## 2020-03-30 ENCOUNTER — NURSE TRIAGE (OUTPATIENT)
Dept: ADMINISTRATIVE | Facility: CLINIC | Age: 45
End: 2020-03-30

## 2020-03-30 NOTE — TELEPHONE ENCOUNTER
Covid 19 Symptom Home Monitoring call    Patient reports feeling a little better.  His concern is that he has been having fevers for a week now, he states they are coming down - was 103.8 and now 101.3  Patient is taking Tylenol, I advised per Tawandasmaruqis update on the use of NSAIDs last week, it is ok to take if needed.  He reports his cough is improving, clear, white mucus.  Having chest congestion, taking mucinex.    Reassurance given  Continue rest and hydration  Continue symptom management with OTC  Continue Self isolation  Report to ED for severe symptoms and/or difficulty breathing      Reason for Disposition   Cough with no complications   Fever with no signs of serious infection or localizing symptoms    Additional Information   Negative: Severe difficulty breathing (e.g., struggling for each breath, speak in single words, bluish lips)   Negative: Sounds like a life-threatening emergency to the triager   Negative: Bluish (or gray) lips or face   Negative: Severe difficulty breathing (e.g., struggling for each breath, speaks in single words)   Negative: Rapid onset of cough and has hives   Negative: Coughing started suddenly after medicine, an allergic food or bee sting   Negative: Difficulty breathing after exposure to flames, smoke, or fumes   Negative: Sounds like a life-threatening emergency to the triager   Negative: Fever > 103 F (39.4 C)    Protocols used: COUGH-A-OH, FEVER-A-OH, CORONAVIRUS (COVID-19) EXPOSURE-A-OH

## 2020-03-31 ENCOUNTER — NURSE TRIAGE (OUTPATIENT)
Dept: ADMINISTRATIVE | Facility: CLINIC | Age: 45
End: 2020-03-31

## 2020-03-31 ENCOUNTER — PATIENT MESSAGE (OUTPATIENT)
Dept: INTERNAL MEDICINE | Facility: CLINIC | Age: 45
End: 2020-03-31

## 2020-03-31 NOTE — TELEPHONE ENCOUNTER
Reason for Disposition   1] COVID-19 infection diagnosed or suspected AND [2] mild symptoms (fever, cough) AND [2] no trouble breathing or other complications    Additional Information   Negative: SEVERE difficulty breathing (e.g., struggling for each breath, speaks in single words)   Negative: Difficult to awaken or acting confused (e.g., disoriented, slurred speech)   Negative: Shock suspected (e.g., cold/pale/clammy skin, too weak to stand, low BP, rapid pulse)   Negative: Sounds like a life-threatening emergency to the triager   Negative: Bluish (or gray) lips or face now   Negative: [1] COVID-19 suspected (e.g., cough, fever, shortness of breath) AND [2] public health department recommends testing   Negative: [1] COVID-19 exposure AND [2] no symptoms   Negative: COVID-19 and Breastfeeding, questions about   Negative: SEVERE or constant chest pain (Exception: mild central chest pain, present only when coughing)   Negative: MODERATE difficulty breathing (e.g., speaks in phrases, SOB even at rest, pulse 100-120)   Negative: Patient sounds very sick or weak to the triager   Negative: MILD difficulty breathing (e.g., minimal/no SOB at rest, SOB with walking, pulse <100)   Negative: Chest pain   Negative: Fever > 103 F (39.4 C)   Negative: [1] Fever > 101 F (38.3 C) AND [2] age > 60   Negative: [1] Fever > 100.0 F (37.8 C) AND [2] bedridden (e.g., nursing home patient, CVA, chronic illness, recovering from surgery)   Negative: HIGH RISK patient (e.g., age > 64 years, diabetes, heart or lung disease, weak immune system)   Negative: Cough present > 3 weeks   Negative: [1] Continuous (nonstop) coughing interferes with work or school AND [2] no improvement using cough treatment per protocol   Negative: Fever present > 3 days (72 hours)   Negative: [1] Fever returns after gone for over 24 hours AND [2] symptoms worse or not improved    Protocols used: CORONAVIRUS (COVID-19) DIAGNOSED OR  [id number]

## 2020-04-01 ENCOUNTER — OFFICE VISIT (OUTPATIENT)
Dept: INTERNAL MEDICINE | Facility: CLINIC | Age: 45
End: 2020-04-01
Payer: COMMERCIAL

## 2020-04-01 ENCOUNTER — NURSE TRIAGE (OUTPATIENT)
Dept: ADMINISTRATIVE | Facility: CLINIC | Age: 45
End: 2020-04-01

## 2020-04-01 ENCOUNTER — HOSPITAL ENCOUNTER (INPATIENT)
Facility: HOSPITAL | Age: 45
LOS: 5 days | Discharge: HOME | DRG: 177 | End: 2020-04-06
Attending: EMERGENCY MEDICINE | Admitting: EMERGENCY MEDICINE
Payer: COMMERCIAL

## 2020-04-01 DIAGNOSIS — J15.3 PNEUMONIA DUE TO GROUP B STREPTOCOCCUS, UNSPECIFIED LATERALITY, UNSPECIFIED PART OF LUNG: ICD-10-CM

## 2020-04-01 DIAGNOSIS — Z20.822 SUSPECTED COVID-19 VIRUS INFECTION: ICD-10-CM

## 2020-04-01 DIAGNOSIS — R05.9 COUGHING: ICD-10-CM

## 2020-04-01 DIAGNOSIS — J12.82 PNEUMONIA DUE TO COVID-19 VIRUS: ICD-10-CM

## 2020-04-01 DIAGNOSIS — R73.9 HYPERGLYCEMIA: ICD-10-CM

## 2020-04-01 DIAGNOSIS — E78.5 HYPERLIPIDEMIA, UNSPECIFIED HYPERLIPIDEMIA TYPE: ICD-10-CM

## 2020-04-01 DIAGNOSIS — E87.6 HYPOKALEMIA: ICD-10-CM

## 2020-04-01 DIAGNOSIS — U07.1 PNEUMONIA DUE TO COVID-19 VIRUS: ICD-10-CM

## 2020-04-01 DIAGNOSIS — E66.01 CLASS 3 SEVERE OBESITY WITH SERIOUS COMORBIDITY AND BODY MASS INDEX (BMI) OF 40.0 TO 44.9 IN ADULT, UNSPECIFIED OBESITY TYPE: ICD-10-CM

## 2020-04-01 DIAGNOSIS — R06.02 SHORTNESS OF BREATH: ICD-10-CM

## 2020-04-01 DIAGNOSIS — U07.1 COVID-19 VIRUS INFECTION: Primary | ICD-10-CM

## 2020-04-01 DIAGNOSIS — R09.02 HYPOXIA: ICD-10-CM

## 2020-04-01 DIAGNOSIS — N17.9 AKI (ACUTE KIDNEY INJURY): ICD-10-CM

## 2020-04-01 DIAGNOSIS — J96.01 ACUTE HYPOXEMIC RESPIRATORY FAILURE: ICD-10-CM

## 2020-04-01 DIAGNOSIS — E11.65 TYPE 2 DIABETES MELLITUS WITH HYPERGLYCEMIA, WITHOUT LONG-TERM CURRENT USE OF INSULIN: ICD-10-CM

## 2020-04-01 DIAGNOSIS — U07.1 COVID-19 VIRUS DETECTED: Primary | ICD-10-CM

## 2020-04-01 LAB
ALBUMIN SERPL BCP-MCNC: 2.7 G/DL (ref 3.5–5.2)
ALP SERPL-CCNC: 60 U/L (ref 55–135)
ALT SERPL W/O P-5'-P-CCNC: 61 U/L (ref 10–44)
ANION GAP SERPL CALC-SCNC: 15 MMOL/L (ref 8–16)
AST SERPL-CCNC: 35 U/L (ref 10–40)
BACTERIA #/AREA URNS AUTO: ABNORMAL /HPF
BASOPHILS # BLD AUTO: 0.04 K/UL (ref 0–0.2)
BASOPHILS NFR BLD: 0.6 % (ref 0–1.9)
BILIRUB SERPL-MCNC: 0.4 MG/DL (ref 0.1–1)
BILIRUB UR QL STRIP: NEGATIVE
BNP SERPL-MCNC: <10 PG/ML (ref 0–99)
BUN SERPL-MCNC: 21 MG/DL (ref 6–30)
BUN SERPL-MCNC: 22 MG/DL (ref 6–20)
CALCIUM SERPL-MCNC: 8.8 MG/DL (ref 8.7–10.5)
CHLORIDE SERPL-SCNC: 97 MMOL/L (ref 95–110)
CHLORIDE SERPL-SCNC: 97 MMOL/L (ref 95–110)
CK SERPL-CCNC: 504 U/L (ref 20–200)
CLARITY UR REFRACT.AUTO: CLEAR
CO2 SERPL-SCNC: 24 MMOL/L (ref 23–29)
COLOR UR AUTO: YELLOW
CREAT SERPL-MCNC: 1.3 MG/DL (ref 0.5–1.4)
CREAT SERPL-MCNC: 1.6 MG/DL (ref 0.5–1.4)
CRP SERPL-MCNC: 220.6 MG/L (ref 0–8.2)
D DIMER PPP IA.FEU-MCNC: 0.99 MG/L FEU
DIFFERENTIAL METHOD: ABNORMAL
EOSINOPHIL # BLD AUTO: 0.1 K/UL (ref 0–0.5)
EOSINOPHIL NFR BLD: 2.2 % (ref 0–8)
ERYTHROCYTE [DISTWIDTH] IN BLOOD BY AUTOMATED COUNT: 12.4 % (ref 11.5–14.5)
EST. GFR  (AFRICAN AMERICAN): 59.7 ML/MIN/1.73 M^2
EST. GFR  (NON AFRICAN AMERICAN): 51.6 ML/MIN/1.73 M^2
FERRITIN SERPL-MCNC: 3114 NG/ML (ref 20–300)
GLUCOSE SERPL-MCNC: 404 MG/DL (ref 70–110)
GLUCOSE SERPL-MCNC: 405 MG/DL (ref 70–110)
GLUCOSE UR QL STRIP: ABNORMAL
HCT VFR BLD AUTO: 37 % (ref 40–54)
HCT VFR BLD CALC: 36 %PCV (ref 36–54)
HGB BLD-MCNC: 11.6 G/DL (ref 14–18)
HGB UR QL STRIP: ABNORMAL
HYALINE CASTS UR QL AUTO: 1 /LPF
IMM GRANULOCYTES # BLD AUTO: 0.17 K/UL (ref 0–0.04)
IMM GRANULOCYTES NFR BLD AUTO: 2.7 % (ref 0–0.5)
INFLUENZA A, MOLECULAR: NEGATIVE
INFLUENZA B, MOLECULAR: NEGATIVE
KETONES UR QL STRIP: ABNORMAL
LACTATE SERPL-SCNC: 1.7 MMOL/L (ref 0.5–2.2)
LDH SERPL L TO P-CCNC: 529 U/L (ref 110–260)
LEUKOCYTE ESTERASE UR QL STRIP: NEGATIVE
LYMPHOCYTES # BLD AUTO: 0.9 K/UL (ref 1–4.8)
LYMPHOCYTES NFR BLD: 14.2 % (ref 18–48)
MAGNESIUM SERPL-MCNC: 2.1 MG/DL (ref 1.6–2.6)
MCH RBC QN AUTO: 28 PG (ref 27–31)
MCHC RBC AUTO-ENTMCNC: 31.4 G/DL (ref 32–36)
MCV RBC AUTO: 89 FL (ref 82–98)
MICROSCOPIC COMMENT: ABNORMAL
MONOCYTES # BLD AUTO: 0.3 K/UL (ref 0.3–1)
MONOCYTES NFR BLD: 4.8 % (ref 4–15)
NEUTROPHILS # BLD AUTO: 4.7 K/UL (ref 1.8–7.7)
NEUTROPHILS NFR BLD: 75.5 % (ref 38–73)
NITRITE UR QL STRIP: NEGATIVE
NRBC BLD-RTO: 0 /100 WBC
PH UR STRIP: 6 [PH] (ref 5–8)
PLATELET # BLD AUTO: 372 K/UL (ref 150–350)
PMV BLD AUTO: 11.5 FL (ref 9.2–12.9)
POC IONIZED CALCIUM: 1.06 MMOL/L (ref 1.06–1.42)
POC TCO2 (MEASURED): 25 MMOL/L (ref 23–29)
POCT GLUCOSE: 327 MG/DL (ref 70–110)
POCT GLUCOSE: 397 MG/DL (ref 70–110)
POTASSIUM BLD-SCNC: 3.1 MMOL/L (ref 3.5–5.1)
POTASSIUM SERPL-SCNC: 3.2 MMOL/L (ref 3.5–5.1)
PROCALCITONIN SERPL IA-MCNC: 0.26 NG/ML
PROT SERPL-MCNC: 7.8 G/DL (ref 6–8.4)
PROT UR QL STRIP: ABNORMAL
RBC # BLD AUTO: 4.15 M/UL (ref 4.6–6.2)
RBC #/AREA URNS AUTO: 1 /HPF (ref 0–4)
SAMPLE: ABNORMAL
SODIUM BLD-SCNC: 134 MMOL/L (ref 136–145)
SODIUM SERPL-SCNC: 136 MMOL/L (ref 136–145)
SP GR UR STRIP: 1.02 (ref 1–1.03)
SPECIMEN SOURCE: NORMAL
SQUAMOUS #/AREA URNS AUTO: 0 /HPF
TROPONIN I SERPL DL<=0.01 NG/ML-MCNC: 0.01 NG/ML (ref 0–0.03)
URN SPEC COLLECT METH UR: ABNORMAL
WBC # BLD AUTO: 6.26 K/UL (ref 3.9–12.7)
WBC #/AREA URNS AUTO: 6 /HPF (ref 0–5)
YEAST UR QL AUTO: ABNORMAL

## 2020-04-01 PROCEDURE — 80053 COMPREHEN METABOLIC PANEL: CPT

## 2020-04-01 PROCEDURE — 87205 SMEAR GRAM STAIN: CPT

## 2020-04-01 PROCEDURE — 87147 CULTURE TYPE IMMUNOLOGIC: CPT

## 2020-04-01 PROCEDURE — 81001 URINALYSIS AUTO W/SCOPE: CPT

## 2020-04-01 PROCEDURE — 87040 BLOOD CULTURE FOR BACTERIA: CPT | Mod: 59

## 2020-04-01 PROCEDURE — 93010 ELECTROCARDIOGRAM REPORT: CPT | Mod: ,,, | Performed by: INTERNAL MEDICINE

## 2020-04-01 PROCEDURE — 11000001 HC ACUTE MED/SURG PRIVATE ROOM

## 2020-04-01 PROCEDURE — 99285 EMERGENCY DEPT VISIT HI MDM: CPT | Mod: 25

## 2020-04-01 PROCEDURE — 99223 PR INITIAL HOSPITAL CARE,LEVL III: ICD-10-PCS | Mod: ,,, | Performed by: INTERNAL MEDICINE

## 2020-04-01 PROCEDURE — 96365 THER/PROPH/DIAG IV INF INIT: CPT

## 2020-04-01 PROCEDURE — 99223 1ST HOSP IP/OBS HIGH 75: CPT | Mod: ,,, | Performed by: INTERNAL MEDICINE

## 2020-04-01 PROCEDURE — 84145 PROCALCITONIN (PCT): CPT

## 2020-04-01 PROCEDURE — 96366 THER/PROPH/DIAG IV INF ADDON: CPT

## 2020-04-01 PROCEDURE — 83735 ASSAY OF MAGNESIUM: CPT

## 2020-04-01 PROCEDURE — 87449 NOS EACH ORGANISM AG IA: CPT

## 2020-04-01 PROCEDURE — 82728 ASSAY OF FERRITIN: CPT

## 2020-04-01 PROCEDURE — 85025 COMPLETE CBC W/AUTO DIFF WBC: CPT

## 2020-04-01 PROCEDURE — 93010 EKG 12-LEAD: ICD-10-PCS | Mod: ,,, | Performed by: INTERNAL MEDICINE

## 2020-04-01 PROCEDURE — 99214 PR OFFICE/OUTPT VISIT, EST, LEVL IV, 30-39 MIN: ICD-10-PCS | Mod: 95,,, | Performed by: NURSE PRACTITIONER

## 2020-04-01 PROCEDURE — 86140 C-REACTIVE PROTEIN: CPT

## 2020-04-01 PROCEDURE — 82962 GLUCOSE BLOOD TEST: CPT | Mod: 59

## 2020-04-01 PROCEDURE — 93005 ELECTROCARDIOGRAM TRACING: CPT

## 2020-04-01 PROCEDURE — 84484 ASSAY OF TROPONIN QUANT: CPT

## 2020-04-01 PROCEDURE — 83880 ASSAY OF NATRIURETIC PEPTIDE: CPT

## 2020-04-01 PROCEDURE — C9399 UNCLASSIFIED DRUGS OR BIOLOG: HCPCS | Performed by: STUDENT IN AN ORGANIZED HEALTH CARE EDUCATION/TRAINING PROGRAM

## 2020-04-01 PROCEDURE — 82960 TEST FOR G6PD ENZYME: CPT

## 2020-04-01 PROCEDURE — 99214 OFFICE O/P EST MOD 30 MIN: CPT | Mod: 95,,, | Performed by: NURSE PRACTITIONER

## 2020-04-01 PROCEDURE — 63600175 PHARM REV CODE 636 W HCPCS: Performed by: STUDENT IN AN ORGANIZED HEALTH CARE EDUCATION/TRAINING PROGRAM

## 2020-04-01 PROCEDURE — 85379 FIBRIN DEGRADATION QUANT: CPT

## 2020-04-01 PROCEDURE — 99285 EMERGENCY DEPT VISIT HI MDM: CPT | Mod: ,,, | Performed by: EMERGENCY MEDICINE

## 2020-04-01 PROCEDURE — 25000003 PHARM REV CODE 250: Performed by: EMERGENCY MEDICINE

## 2020-04-01 PROCEDURE — 82550 ASSAY OF CK (CPK): CPT

## 2020-04-01 PROCEDURE — 87502 INFLUENZA DNA AMP PROBE: CPT

## 2020-04-01 PROCEDURE — 99285 PR EMERGENCY DEPT VISIT,LEVEL V: ICD-10-PCS | Mod: ,,, | Performed by: EMERGENCY MEDICINE

## 2020-04-01 PROCEDURE — 63600175 PHARM REV CODE 636 W HCPCS: Performed by: EMERGENCY MEDICINE

## 2020-04-01 PROCEDURE — 83615 LACTATE (LD) (LDH) ENZYME: CPT

## 2020-04-01 PROCEDURE — 83605 ASSAY OF LACTIC ACID: CPT

## 2020-04-01 PROCEDURE — 87070 CULTURE OTHR SPECIMN AEROBIC: CPT

## 2020-04-01 PROCEDURE — 63700000 PHARM REV CODE 250 ALT 637 W/O HCPCS: Performed by: STUDENT IN AN ORGANIZED HEALTH CARE EDUCATION/TRAINING PROGRAM

## 2020-04-01 RX ORDER — INSULIN ASPART 100 [IU]/ML
1-10 INJECTION, SOLUTION INTRAVENOUS; SUBCUTANEOUS EVERY 6 HOURS
Status: DISCONTINUED | OUTPATIENT
Start: 2020-04-01 | End: 2020-04-02

## 2020-04-01 RX ORDER — AMLODIPINE BESYLATE 10 MG/1
10 TABLET ORAL DAILY
Status: DISCONTINUED | OUTPATIENT
Start: 2020-04-02 | End: 2020-04-06 | Stop reason: HOSPADM

## 2020-04-01 RX ORDER — ACETAMINOPHEN 325 MG/1
650 TABLET ORAL EVERY 4 HOURS PRN
Status: DISCONTINUED | OUTPATIENT
Start: 2020-04-01 | End: 2020-04-06 | Stop reason: HOSPADM

## 2020-04-01 RX ORDER — PRAVASTATIN SODIUM 10 MG/1
20 TABLET ORAL DAILY
Status: DISCONTINUED | OUTPATIENT
Start: 2020-04-02 | End: 2020-04-01

## 2020-04-01 RX ORDER — CEFTRIAXONE 1 G/1
1 INJECTION, POWDER, FOR SOLUTION INTRAMUSCULAR; INTRAVENOUS
Status: DISCONTINUED | OUTPATIENT
Start: 2020-04-02 | End: 2020-04-01

## 2020-04-01 RX ORDER — SODIUM CHLORIDE 0.9 % (FLUSH) 0.9 %
10 SYRINGE (ML) INJECTION
Status: DISCONTINUED | OUTPATIENT
Start: 2020-04-01 | End: 2020-04-06 | Stop reason: HOSPADM

## 2020-04-01 RX ORDER — IBUPROFEN 200 MG
24 TABLET ORAL
Status: DISCONTINUED | OUTPATIENT
Start: 2020-04-01 | End: 2020-04-04

## 2020-04-01 RX ORDER — LEVOFLOXACIN 750 MG/1
750 TABLET ORAL DAILY
Status: COMPLETED | OUTPATIENT
Start: 2020-04-02 | End: 2020-04-05

## 2020-04-01 RX ORDER — INSULIN ASPART 100 [IU]/ML
1-10 INJECTION, SOLUTION INTRAVENOUS; SUBCUTANEOUS
Status: DISCONTINUED | OUTPATIENT
Start: 2020-04-01 | End: 2020-04-04

## 2020-04-01 RX ORDER — AMOXICILLIN 250 MG
1 CAPSULE ORAL 2 TIMES DAILY PRN
Status: DISCONTINUED | OUTPATIENT
Start: 2020-04-01 | End: 2020-04-06 | Stop reason: HOSPADM

## 2020-04-01 RX ORDER — BENZONATATE 100 MG/1
100 CAPSULE ORAL 3 TIMES DAILY PRN
Qty: 30 CAPSULE | Refills: 0 | Status: SHIPPED | OUTPATIENT
Start: 2020-04-01 | End: 2020-04-14

## 2020-04-01 RX ORDER — ALBUTEROL SULFATE 90 UG/1
2 AEROSOL, METERED RESPIRATORY (INHALATION) EVERY 6 HOURS PRN
Qty: 18 G | Refills: 2 | Status: ON HOLD | OUTPATIENT
Start: 2020-04-01 | End: 2020-04-03 | Stop reason: SDUPTHER

## 2020-04-01 RX ORDER — LOPERAMIDE HYDROCHLORIDE 2 MG/1
2 CAPSULE ORAL EVERY 6 HOURS PRN
Status: DISCONTINUED | OUTPATIENT
Start: 2020-04-01 | End: 2020-04-06 | Stop reason: HOSPADM

## 2020-04-01 RX ORDER — AZITHROMYCIN 250 MG/1
250 TABLET, FILM COATED ORAL DAILY
Status: DISCONTINUED | OUTPATIENT
Start: 2020-04-03 | End: 2020-04-01

## 2020-04-01 RX ORDER — CLINDAMYCIN PHOSPHATE AND TRETINOIN GEL 1.2%/0.025% 10; .25 MG/G; MG/G
GEL TOPICAL NIGHTLY
COMMUNITY
Start: 2018-01-31

## 2020-04-01 RX ORDER — ENOXAPARIN SODIUM 100 MG/ML
40 INJECTION SUBCUTANEOUS EVERY 24 HOURS
Status: DISCONTINUED | OUTPATIENT
Start: 2020-04-01 | End: 2020-04-06 | Stop reason: HOSPADM

## 2020-04-01 RX ORDER — BENZONATATE 100 MG/1
100 CAPSULE ORAL 3 TIMES DAILY PRN
Status: DISCONTINUED | OUTPATIENT
Start: 2020-04-01 | End: 2020-04-01

## 2020-04-01 RX ORDER — GLUCAGON 1 MG
1 KIT INJECTION
Status: DISCONTINUED | OUTPATIENT
Start: 2020-04-01 | End: 2020-04-04

## 2020-04-01 RX ORDER — POTASSIUM CHLORIDE 20 MEQ/15ML
40 SOLUTION ORAL EVERY 4 HOURS
Status: COMPLETED | OUTPATIENT
Start: 2020-04-01 | End: 2020-04-02

## 2020-04-01 RX ORDER — IBUPROFEN 200 MG
16 TABLET ORAL
Status: DISCONTINUED | OUTPATIENT
Start: 2020-04-01 | End: 2020-04-04

## 2020-04-01 RX ORDER — TALC
6 POWDER (GRAM) TOPICAL NIGHTLY PRN
Status: DISCONTINUED | OUTPATIENT
Start: 2020-04-01 | End: 2020-04-06 | Stop reason: HOSPADM

## 2020-04-01 RX ORDER — ALBUTEROL SULFATE 90 UG/1
2 AEROSOL, METERED RESPIRATORY (INHALATION) EVERY 6 HOURS PRN
Status: DISCONTINUED | OUTPATIENT
Start: 2020-04-01 | End: 2020-04-06 | Stop reason: HOSPADM

## 2020-04-01 RX ORDER — ONDANSETRON 8 MG/1
8 TABLET, ORALLY DISINTEGRATING ORAL EVERY 8 HOURS PRN
Status: DISCONTINUED | OUTPATIENT
Start: 2020-04-01 | End: 2020-04-06 | Stop reason: HOSPADM

## 2020-04-01 RX ORDER — LEVOFLOXACIN 5 MG/ML
750 INJECTION, SOLUTION INTRAVENOUS
Status: DISCONTINUED | OUTPATIENT
Start: 2020-04-01 | End: 2020-04-01

## 2020-04-01 RX ORDER — BENZONATATE 100 MG/1
100 CAPSULE ORAL 3 TIMES DAILY PRN
Status: DISCONTINUED | OUTPATIENT
Start: 2020-04-01 | End: 2020-04-06 | Stop reason: HOSPADM

## 2020-04-01 RX ADMIN — POTASSIUM CHLORIDE 40 MEQ: 20 SOLUTION ORAL at 11:04

## 2020-04-01 RX ADMIN — BENZONATATE 100 MG: 100 CAPSULE ORAL at 06:04

## 2020-04-01 RX ADMIN — INSULIN DETEMIR 10 UNITS: 100 INJECTION, SOLUTION SUBCUTANEOUS at 10:04

## 2020-04-01 RX ADMIN — INSULIN ASPART 4 UNITS: 100 INJECTION, SOLUTION INTRAVENOUS; SUBCUTANEOUS at 10:04

## 2020-04-01 RX ADMIN — ENOXAPARIN SODIUM 40 MG: 100 INJECTION SUBCUTANEOUS at 10:04

## 2020-04-01 RX ADMIN — LEVOFLOXACIN 750 MG: 750 INJECTION, SOLUTION INTRAVENOUS at 05:04

## 2020-04-01 RX ADMIN — SODIUM CHLORIDE, SODIUM LACTATE, POTASSIUM CHLORIDE, AND CALCIUM CHLORIDE 500 ML: .6; .31; .03; .02 INJECTION, SOLUTION INTRAVENOUS at 10:04

## 2020-04-01 NOTE — ED TRIAGE NOTES
PT reports to ED today w/ increasing SOB starting yesterday. PT 90% SPO2 room air. PT 96% 2 L nasal cannula. PT tested positive COVID-19. Denies chest pain.

## 2020-04-01 NOTE — TELEPHONE ENCOUNTER
Can we get him to come through the drive through to get evaluated- have his pulse ox checked--  I am off today so can you let them know in the drive through that he is coming in

## 2020-04-01 NOTE — PROGRESS NOTES
The patient location is: home    The chief complaint leading to consultation is: shortness of breath and cough, COVID positive   Visit type: Virtual visit with synchronous audio and video  Total time spent with patient: 15 minutes  Each patient to whom he or she provides medical services by telemedicine is:  (1) informed of the relationship between the physician and patient and the respective role of any other health care provider with respect to management of the patient; and (2) notified that he or she may decline to receive medical services by telemedicine and may withdraw from such care at any time.    Review of patient's allergies indicates:   Allergen Reactions    Keflex [cephalexin] Rash       Current Outpatient Medications:     clindamycin-tretinoin (ZIANA) gel, , Disp: , Rfl:     albuterol (PROAIR HFA) 90 mcg/actuation inhaler, Inhale 2 puffs into the lungs every 6 (six) hours as needed for Wheezing or Shortness of Breath. Rescue, Disp: 18 g, Rfl: 2    amLODIPine (NORVASC) 10 MG tablet, TAKE ONE TABLET BY MOUTH DAILY, Disp: 90 tablet, Rfl: 3    benzonatate (TESSALON) 100 MG capsule, Take 1 capsule (100 mg total) by mouth 3 (three) times daily as needed for Cough., Disp: 30 capsule, Rfl: 0    fluocinonide (LIDEX) 0.05 % external solution, AAA scalp qd and can use on hand and elbows followed by moisturizer prn flare, Disp: 60 mL, Rfl: 3    pravastatin (PRAVACHOL) 20 MG tablet, TAKE ONE TABLET BY MOUTH DAILY, Disp: 90 tablet, Rfl: 3    valsartan-hydrochlorothiazide (DIOVAN-HCT) 320-25 mg per tablet, TAKE ONE TABLET BY MOUTH DAILY, Disp: 90 tablet, Rfl: 3    Patient Active Problem List   Diagnosis    Hyperlipidemia    Hypertension    Obesity    Sleep apnea    Rash    Type 2 diabetes mellitus without complication    Gingiva disorder     Past Medical History:   Diagnosis Date    Hyperlipidemia     Hypertension     Obesity     Sleep apnea      Past Surgical History:   Procedure Laterality Date  "   achilies tendon       Social History     Socioeconomic History    Marital status:      Spouse name: Not on file    Number of children: Not on file    Years of education: Not on file    Highest education level: Not on file   Occupational History     Employer: [employer]   Social Needs    Financial resource strain: Not on file    Food insecurity:     Worry: Not on file     Inability: Not on file    Transportation needs:     Medical: Not on file     Non-medical: Not on file   Tobacco Use    Smoking status: Never Smoker    Smokeless tobacco: Never Used   Substance and Sexual Activity    Alcohol use: No    Drug use: No    Sexual activity: Yes     Partners: Female   Lifestyle    Physical activity:     Days per week: Not on file     Minutes per session: Not on file    Stress: Not on file   Relationships    Social connections:     Talks on phone: Not on file     Gets together: Not on file     Attends [employer] service: Not on file     Active member of club or organization: Not on file     Attends meetings of clubs or organizations: Not on file     Relationship status: Not on file   Other Topics Concern    Not on file   Social History Narrative    Not on file     Family History   Problem Relation Age of Onset    Hypertension Father     Acne Neg Hx     Melanoma Neg Hx        Notes: Pt of Dr. Alexander, here virtually due to the following nurse triage today which states, "Patient stated his fever is getting better. Temperature 98.7 today (before he took Tylenol). Yesterday temperature ranged from 98 to100. However, patient stated his cough and SOB is getting worse. He gets SOB when he is walking and feels like he may pass out when he looks down while standing up. He is still feeling weak. Feels worst in the morning. Per protocol advised patient to contact his PCP, Dr. Alexander. The patient confirmed that he will contact Dr. Alexander as his cough and SOB is getting worse. Advised patient to call [facility] " "COVID Call Line back if he has worsening of symptoms or any questions. Advised patient to call 911 if he has severe difficulty breathing, is so weak he can't stand, or if he gets blue-gray lips or face."    Spoke to wife first then pt. Pt is visibly short winded throughout the call. He has had a change in his cough that is producing frothy sputum and he is having Shortness of breath just walking to the bathroom. Has been taking Mucinex and Tylenol alternated with ibuprofen and is fever free. Pt states whenever he looks down he is short of breath and with any activity. He has a pulse ox at home and before the cough worsened he was around 94% on room air. He checked it while on phone with me laying in bed and his pulse ox was at 87%. Went down at one point during the call to 84%. He slao reports lack of taste or smell.    Diagnoses and all orders for this visit:    COVID-19 virus infection  -     benzonatate (TESSALON) 100 MG capsule; Take 1 capsule (100 mg total) by mouth 3 (three) times daily as needed for Cough.  -     albuterol (PROAIR HFA) 90 mcg/actuation inhaler; Inhale 2 puffs into the lungs every 6 (six) hours as needed for Wheezing or Shortness of Breath. Rescue    Shortness of breath  -     benzonatate (TESSALON) 100 MG capsule; Take 1 capsule (100 mg total) by mouth 3 (three) times daily as needed for Cough.  -     albuterol (PROAIR HFA) 90 mcg/actuation inhaler; Inhale 2 puffs into the lungs every 6 (six) hours as needed for Wheezing or Shortness of Breath. Rescue    Hypoxia  -     benzonatate (TESSALON) 100 MG capsule; Take 1 capsule (100 mg total) by mouth 3 (three) times daily as needed for Cough.  -     albuterol (PROAIR HFA) 90 mcg/actuation inhaler; Inhale 2 puffs into the lungs every 6 (six) hours as needed for Wheezing or Shortness of Breath. Rescue    Coughing  -     benzonatate (TESSALON) 100 MG capsule; Take 1 capsule (100 mg total) by mouth 3 (three) times daily as needed for Cough.  -     " albuterol (PROAIR HFA) 90 mcg/actuation inhaler; Inhale 2 puffs into the lungs every 6 (six) hours as needed for Wheezing or Shortness of Breath. Rescue    Based on clinical presentation during phone call, I advised pt to go to the ER now due to hypoxia at rest. I recommended going by EMS, however pt will have his wife Bree to take him. I told pt I will check back with him in an hour to see if he has been seen/evaluated. Verbalized understanding.

## 2020-04-01 NOTE — ED PROVIDER NOTES
Encounter Date: 4/1/2020       History     Chief Complaint   Patient presents with    Shortness of Breath     covid +    Cough     44-year-old male with past medical history of SUPRIYA, HTN, HLD presenting with 2 days of flu-like symptoms. Patient reports that he has had several days of cough, myalgias, fatigue, and fever, now with worsening shortness of breath over the past 2 days. Worse with exertion.   Also endorses some diarrhea, loss of smell, and occasional forgetfulness. Denies HA, neck stiffness, numbness/focal weaknss or speech changes. Denies nausea or vomiting, abdominal pain.         Review of patient's allergies indicates:   Allergen Reactions    Keflex [cephalexin] Rash     Past Medical History:   Diagnosis Date    Hyperlipidemia     Hypertension     Obesity     Sleep apnea      Past Surgical History:   Procedure Laterality Date    achilies tendon       Family History   Problem Relation Age of Onset    Hypertension Father     Acne Neg Hx     Melanoma Neg Hx      Social History     Tobacco Use    Smoking status: Never Smoker    Smokeless tobacco: Never Used   Substance Use Topics    Alcohol use: No    Drug use: No     Review of Systems   Constitutional: Positive for fever. Negative for chills and fatigue.   HENT: Negative for congestion, rhinorrhea and sore throat.         Anosmia   Eyes: Negative for visual disturbance.   Respiratory: Positive for cough and shortness of breath.    Cardiovascular: Negative for chest pain and leg swelling.   Gastrointestinal: Positive for diarrhea. Negative for abdominal pain, nausea and vomiting.   Genitourinary: Negative for decreased urine volume, difficulty urinating, flank pain and urgency.   Musculoskeletal: Positive for myalgias. Negative for back pain, neck pain and neck stiffness.   Skin: Negative for color change and rash.   Allergic/Immunologic: Negative for immunocompromised state.   Neurological: Positive for weakness. Negative for dizziness,  light-headedness and headaches.   Hematological: Does not bruise/bleed easily.   Psychiatric/Behavioral:        Easily forgetful       Physical Exam     Initial Vitals [04/01/20 1400]   BP Pulse Resp Temp SpO2   120/61 86 (!) 24 99 °F (37.2 °C) (!) 90 %      MAP       --         Physical Exam    Nursing note and vitals reviewed.  Constitutional: He appears well-developed and well-nourished. He is not diaphoretic. No distress.   HENT:   Head: Normocephalic and atraumatic.   Nose: Nose normal.   Eyes: Conjunctivae and EOM are normal. Pupils are equal, round, and reactive to light. No scleral icterus.   Neck: Normal range of motion. Neck supple.   Cardiovascular: Normal rate, regular rhythm and intact distal pulses.   Pulmonary/Chest: No stridor. No respiratory distress.   Musculoskeletal: Normal range of motion. He exhibits no edema or tenderness.   Neurological: He is alert and oriented to person, place, and time. He has normal strength. No cranial nerve deficit or sensory deficit.   Clear speech, no FNDs   Skin: Skin is warm and dry. Capillary refill takes less than 2 seconds. No pallor.   Psychiatric: He has a normal mood and affect. His behavior is normal. Judgment and thought content normal.         ED Course   Procedures  Labs Reviewed   CBC W/ AUTO DIFFERENTIAL - Abnormal; Notable for the following components:       Result Value    RBC 4.15 (*)     Hemoglobin 11.6 (*)     Hematocrit 37.0 (*)     Mean Corpuscular Hemoglobin Conc 31.4 (*)     Platelets 372 (*)     Immature Granulocytes 2.7 (*)     Immature Grans (Abs) 0.17 (*)     Lymph # 0.9 (*)     Gran% 75.5 (*)     Lymph% 14.2 (*)     All other components within normal limits   FERRITIN - Abnormal; Notable for the following components:    Ferritin 3,114 (*)     All other components within normal limits   C-REACTIVE PROTEIN - Abnormal; Notable for the following components:    .6 (*)     All other components within normal limits   CK - Abnormal; Notable  for the following components:     (*)     All other components within normal limits   LACTATE DEHYDROGENASE - Abnormal; Notable for the following components:     (*)     All other components within normal limits   COMPREHENSIVE METABOLIC PANEL - Abnormal; Notable for the following components:    Potassium 3.2 (*)     Glucose 405 (*)     BUN, Bld 22 (*)     Creatinine 1.6 (*)     Albumin 2.7 (*)     ALT 61 (*)     eGFR if  59.7 (*)     eGFR if non  51.6 (*)     All other components within normal limits   D DIMER, QUANTITATIVE - Abnormal; Notable for the following components:    D-Dimer 0.99 (*)     All other components within normal limits   POCT GLUCOSE - Abnormal; Notable for the following components:    POCT Glucose 397 (*)     All other components within normal limits   ISTAT PROCEDURE - Abnormal; Notable for the following components:    POC Glucose 404 (*)     POC Sodium 134 (*)     POC Potassium 3.1 (*)     All other components within normal limits   INFLUENZA A & B BY MOLECULAR   LACTIC ACID, PLASMA   TROPONIN I   MAGNESIUM   B-TYPE NATRIURETIC PEPTIDE   LEGIONELLA ANTIGEN, URINE RANDOM   ISTAT CHEM8        ECG Results          EKG 12-lead (Final result)  Result time 04/01/20 15:21:32    Final result by Interface, Lab In [facility] (04/01/20 15:21:32)                 Narrative:    Test Reason : R68.89,    Vent. Rate : 077 BPM     Atrial Rate : 077 BPM     P-R Int : 148 ms          QRS Dur : 106 ms      QT Int : 374 ms       P-R-T Axes : 062 065 -47 degrees     QTc Int : 423 ms      Normal sinus rhythm  Baseline wander  Nonspecific ST and/or T wave abnormalities  When compared with ECG of 04-AUG-2016 15:51,  Non-specific change in ST segment in Inferior leads  Confirmed by MIKA MARIE MD ([id number]) on 4/1/2020 3:21:26 PM    Referred By: [email or web address]   SELF           Confirmed By:MIKA MARIE MD                            Imaging Results          X-Ray Chest AP Portable (Final  "result)  Result time 04/01/20 15:52:54    Final result by Ginny Colmenares MD (04/01/20 15:52:54)                 Impression:      Patchy bilateral airspace opacities with a basilar predominance, concerning for infection.      Electronically signed by: Ginny Colmenares MD  Date:    04/01/2020  Time:    15:52             Narrative:    EXAMINATION:  XR CHEST AP PORTABLE    CLINICAL HISTORY:  Suspected Covid-19 Virus Infection;    TECHNIQUE:  Single frontal portable view of the chest was performed.    COMPARISON:  January 26, 2016    FINDINGS:  The cardiac silhouette is exaggerated by AP portable technique and submaximal inspiration.  Pulmonary vascularity is not increased.  There are patchy airspace opacities bilaterally with a basilar predominance.  Although other etiologies such as edema can not be completely excluded, the findings are concerning for infection.  There is no large pleural effusion or pneumothorax.  Visualized osseous structures are intact.                                 Medical Decision Making:   History:   Old Medical Records: I decided to obtain old medical records.  Old Records Summarized: records from clinic visits and records from previous admission(s).       <> Summary of Records: Pt seen by PCP today: "Patient stated his fever is getting better. Temperature 98.7 today (before he took Tylenol). Yesterday temperature ranged from 98 to100. However, patient stated his cough and SOB is getting worse. He gets SOB when he is walking and feels like he may pass out when he looks down while standing up. He is still feeling weak. Feels worst in the morning. Per protocol advised patient to contact his PCP, Dr. Alexander. The patient confirmed that he will contact Dr. Alexander as his cough and SOB is getting worse. Advised patient to call [facility] COVID Call Line back if he has worsening of symptoms or any questions. Advised patient to call 911 if he has severe difficulty breathing, is so weak he can't stand, " "or if he gets blue-gray lips or face."     Spoke to wife first then pt. Pt is visibly short winded throughout the call. He has had a change in his cough that is producing frothy sputum and he is having Shortness of breath just walking to the bathroom. Has been taking Mucinex and Tylenol alternated with ibuprofen and is fever free. Pt states whenever he looks down he is short of breath and with any activity. He has a pulse ox at home and before the cough worsened he was around 94% on room air. He checked it while on phone with me laying in bed and his pulse ox was at 87%. Went down at one point during the call to 84%."  Initial Assessment:   97% on 2L NC, appears fatigued  Differential Diagnosis:   Influenza, covid, viral syndrome, PNA, pleural effusion, pulmonary edema, electrolyte abnormality, metabolic abnormality. No FNDs cocnerning for CVA, no meningismus and does not appear encephalopathic.     Independently Interpreted Test(s):   I have ordered and independently interpreted X-rays - see summary below.       <> Summary of X-Ray Reading(s): Bilateral PNA  I have ordered and independently interpreted EKG Reading(s) - see summary below       <> Summary of EKG Reading(s): NSR, no STEMI  Clinical Tests:   Lab Tests: Ordered and Reviewed  Radiological Study: Ordered and Reviewed  Medical Tests: Ordered and Reviewed  ED Management:  Given documented hypoxia requiring 2L NC and bilateral PNA appreciated on CXR, plan admission. Levaquin for PNA given Keflex allergy.     6:35 PM  CMP repeatedly hemolyzed.  I-STAT Chem 8 shows creatinine 1.3, which is patient's baseline per review of records.  Glucose elevated to 404 however, patient states he has no history of diabetes although he has been told he is prediabetic.  Given COVID will avoid aggressive IV hydration for hyperglycemia.  Other:   I have discussed this case with another health care provider.       <> Summary of the Discussion: D/w Dr. Bahena, agrees no " indication for IVF for hyperglycemia.                                  Clinical Impression:       ICD-10-CM ICD-9-CM   1. Pneumonia due to COVID-19 virus U07.1     J12.89    2. Suspected Covid-19 Virus Infection R68.89    3. Hypoxia R09.02 799.02   4. COVID-19 virus infection U07.1    5. Shortness of breath R06.02 786.05   6. Coughing R05 786.2   7. Hyperglycemia R73.9 790.29             ED Disposition Condition    Admit                           Briana Mtz MD  04/04/20 1637

## 2020-04-01 NOTE — Clinical Note
Saw pt virtually, his oxygen is between 84-87% I saw this myself on virtual visit with respiratory distress. Advised pt to go to ER today as he tested COVID positive on 3-24-20

## 2020-04-01 NOTE — PATIENT INSTRUCTIONS
Based on clinical presentation during phone call, I advised pt to go to the ER now due to hypoxia at rest. I recommended going by EMS, however pt will have his wife Matthews to take him. I told pt I will check back with him in an hour to see if he has been seen/evaluated. Verbalized understanding.

## 2020-04-01 NOTE — TELEPHONE ENCOUNTER
Patient stated his fever is getting better. Temperature 98.7 today (before he took Tylenol). Yesterday temperature ranged from 98 to100. However, patient stated his cough and SOB is getting worse. He gets SOB when he is walking and feels like he may pass out when he looks down while standing up. He is still feeling weak. Feels worst in the morning.    Per protocol advised patient to contact his PCP, Dr. Alexander. The patient confirmed that he will contact Dr. Alexander as his cough and SOB is getting worse.    Ad[facility] to [facility] back if he has worsening of symptoms or any questions. Advised patient to call 911 if he has severe difficulty breathing, is so weak he can't stand, or if he gets blue-gray lips or face.    Reason for Disposition   MILD difficulty breathing (e.g., minimal/no SOB at rest, SOB with walking, pulse <100)    Additional Information   Negative: SEVERE difficulty breathing (e.g., struggling for each breath, speaks in single words)   Negative: Difficult to awaken or acting confused (e.g., disoriented, slurred speech)   Negative: Bluish (or gray) lips or face now   Negative: Shock suspected (e.g., cold/pale/clammy skin, too weak to stand, low BP, rapid pulse)   Negative: Sounds like a life-threatening emergency to the triager   Negative: SEVERE or constant chest pain (Exception: mild central chest pain, present only when coughing)   Negative: MODERATE difficulty breathing (e.g., speaks in phrases, SOB even at rest, pulse 100-120)   Negative: Patient sounds very sick or weak to the triager    Protocols used: CORONAVIRUS (COVID-19) DIAGNOSED OR [id number]

## 2020-04-02 LAB
ALBUMIN SERPL BCP-MCNC: 2.5 G/DL (ref 3.5–5.2)
ALP SERPL-CCNC: 60 U/L (ref 55–135)
ALT SERPL W/O P-5'-P-CCNC: 54 U/L (ref 10–44)
ANION GAP SERPL CALC-SCNC: 12 MMOL/L (ref 8–16)
AST SERPL-CCNC: 33 U/L (ref 10–40)
BASOPHILS # BLD AUTO: 0.04 K/UL (ref 0–0.2)
BASOPHILS NFR BLD: 0.7 % (ref 0–1.9)
BILIRUB SERPL-MCNC: 0.4 MG/DL (ref 0.1–1)
BUN SERPL-MCNC: 19 MG/DL (ref 6–20)
CALCIUM SERPL-MCNC: 8.3 MG/DL (ref 8.7–10.5)
CHLORIDE SERPL-SCNC: 96 MMOL/L (ref 95–110)
CO2 SERPL-SCNC: 25 MMOL/L (ref 23–29)
CREAT SERPL-MCNC: 1.4 MG/DL (ref 0.5–1.4)
DIFFERENTIAL METHOD: ABNORMAL
EOSINOPHIL # BLD AUTO: 0.2 K/UL (ref 0–0.5)
EOSINOPHIL NFR BLD: 2.9 % (ref 0–8)
ERYTHROCYTE [DISTWIDTH] IN BLOOD BY AUTOMATED COUNT: 12.5 % (ref 11.5–14.5)
EST. GFR  (AFRICAN AMERICAN): >60 ML/MIN/1.73 M^2
EST. GFR  (NON AFRICAN AMERICAN): >60 ML/MIN/1.73 M^2
ESTIMATED AVG GLUCOSE: 223 MG/DL (ref 68–131)
GLUCOSE SERPL-MCNC: 315 MG/DL (ref 70–110)
GLUCOSE-6-PHOSPHATE DEHYDROGENASE QUAL: ABNORMAL
HBA1C MFR BLD HPLC: 9.4 % (ref 4–5.6)
HCT VFR BLD AUTO: 35.1 % (ref 40–54)
HGB BLD-MCNC: 10.9 G/DL (ref 14–18)
IMM GRANULOCYTES # BLD AUTO: 0.12 K/UL (ref 0–0.04)
IMM GRANULOCYTES NFR BLD AUTO: 2 % (ref 0–0.5)
LYMPHOCYTES # BLD AUTO: 1.2 K/UL (ref 1–4.8)
LYMPHOCYTES NFR BLD: 21 % (ref 18–48)
MAGNESIUM SERPL-MCNC: 2 MG/DL (ref 1.6–2.6)
MCH RBC QN AUTO: 28.2 PG (ref 27–31)
MCHC RBC AUTO-ENTMCNC: 31.1 G/DL (ref 32–36)
MCV RBC AUTO: 91 FL (ref 82–98)
MONOCYTES # BLD AUTO: 0.5 K/UL (ref 0.3–1)
MONOCYTES NFR BLD: 8 % (ref 4–15)
NEUTROPHILS # BLD AUTO: 3.9 K/UL (ref 1.8–7.7)
NEUTROPHILS NFR BLD: 65.4 % (ref 38–73)
NRBC BLD-RTO: 0 /100 WBC
PHOSPHATE SERPL-MCNC: 2.3 MG/DL (ref 2.7–4.5)
PLATELET # BLD AUTO: 423 K/UL (ref 150–350)
PMV BLD AUTO: 10.5 FL (ref 9.2–12.9)
POCT GLUCOSE: 197 MG/DL (ref 70–110)
POCT GLUCOSE: 248 MG/DL (ref 70–110)
POCT GLUCOSE: 266 MG/DL (ref 70–110)
POCT GLUCOSE: 278 MG/DL (ref 70–110)
POCT GLUCOSE: 317 MG/DL (ref 70–110)
POTASSIUM SERPL-SCNC: 3.5 MMOL/L (ref 3.5–5.1)
PROT SERPL-MCNC: 7.2 G/DL (ref 6–8.4)
RBC # BLD AUTO: 3.86 M/UL (ref 4.6–6.2)
SODIUM SERPL-SCNC: 133 MMOL/L (ref 136–145)
WBC # BLD AUTO: 5.91 K/UL (ref 3.9–12.7)

## 2020-04-02 PROCEDURE — 11000001 HC ACUTE MED/SURG PRIVATE ROOM

## 2020-04-02 PROCEDURE — 84100 ASSAY OF PHOSPHORUS: CPT

## 2020-04-02 PROCEDURE — 80053 COMPREHEN METABOLIC PANEL: CPT

## 2020-04-02 PROCEDURE — 25000003 PHARM REV CODE 250: Performed by: STUDENT IN AN ORGANIZED HEALTH CARE EDUCATION/TRAINING PROGRAM

## 2020-04-02 PROCEDURE — 99232 SBSQ HOSP IP/OBS MODERATE 35: CPT | Mod: ,,, | Performed by: INTERNAL MEDICINE

## 2020-04-02 PROCEDURE — 99232 PR SUBSEQUENT HOSPITAL CARE,LEVL II: ICD-10-PCS | Mod: ,,, | Performed by: INTERNAL MEDICINE

## 2020-04-02 PROCEDURE — 83036 HEMOGLOBIN GLYCOSYLATED A1C: CPT

## 2020-04-02 PROCEDURE — 63600175 PHARM REV CODE 636 W HCPCS: Performed by: STUDENT IN AN ORGANIZED HEALTH CARE EDUCATION/TRAINING PROGRAM

## 2020-04-02 PROCEDURE — 36415 COLL VENOUS BLD VENIPUNCTURE: CPT

## 2020-04-02 PROCEDURE — 63700000 PHARM REV CODE 250 ALT 637 W/O HCPCS: Performed by: STUDENT IN AN ORGANIZED HEALTH CARE EDUCATION/TRAINING PROGRAM

## 2020-04-02 PROCEDURE — 85025 COMPLETE CBC W/AUTO DIFF WBC: CPT

## 2020-04-02 PROCEDURE — 83735 ASSAY OF MAGNESIUM: CPT

## 2020-04-02 RX ORDER — POTASSIUM CHLORIDE 20 MEQ/15ML
40 SOLUTION ORAL EVERY 4 HOURS
Status: ACTIVE | OUTPATIENT
Start: 2020-04-02 | End: 2020-04-02

## 2020-04-02 RX ADMIN — ENOXAPARIN SODIUM 40 MG: 100 INJECTION SUBCUTANEOUS at 08:04

## 2020-04-02 RX ADMIN — AMLODIPINE BESYLATE 10 MG: 10 TABLET ORAL at 09:04

## 2020-04-02 RX ADMIN — INSULIN ASPART 4 UNITS: 100 INJECTION, SOLUTION INTRAVENOUS; SUBCUTANEOUS at 01:04

## 2020-04-02 RX ADMIN — LEVOFLOXACIN 750 MG: 750 TABLET, FILM COATED ORAL at 09:04

## 2020-04-02 RX ADMIN — POTASSIUM CHLORIDE 40 MEQ: 20 SOLUTION ORAL at 02:04

## 2020-04-02 RX ADMIN — INSULIN ASPART 3 UNITS: 100 INJECTION, SOLUTION INTRAVENOUS; SUBCUTANEOUS at 05:04

## 2020-04-02 RX ADMIN — POTASSIUM CHLORIDE 40 MEQ: 20 SOLUTION ORAL at 09:04

## 2020-04-02 NOTE — H&P
Hospital Medicine  History and Physical  [facility]      Patient Name: Deepak Tobias  MRN:  [id number]  Hospital Medicine Team: [facility] Shauna Valderrama DO  Date of Admission:  4/1/2020     Length of Stay:  LOS: 0 days     Principal Problem: Suspected Covid-19 Virus Infection    Chief complaint    Shortness of breath    HPI    Mr. Deepak Tobias is a 44 year old male with hypertension, hyperlipidemia, prediabetes who presented to the ED on 4/1 for worsening shortness of breath and was admitted to hospital medicine.     Mr. Tobias was recently diagnosed with COVID-19 infection after he called in to his PCP on 3/24 and had drive-by testing. At that time, he complained of fever up to 102 F, loose stools, productive cough with green sputum, and shortness of breath. He had a telemedicine visit earlier in the day on 4/1 and complained of worsening shortness of breath. He checked his oxygen saturation, which went as low as 84% on room air during the telemedicine visit. He was told to present to the ED for evaluation. Upon arrival to the ED, he was found to be hypoxic with oxygen saturation 90% on room air. He was placed on 2L NC with subsequent improvement.     Mr. Tobias states that since he has had loose stools (about 3 per day) for the past several days. He has also noted anosmia.     Review of Systems    Constitutional: Positive for fever, chills, fatigue, poor appetite   HENT: Negative for sore throat, negative for trouble swallowing.    Eyes: Negative for photophobia, visual disturbance.   Respiratory: Positive for cough, shortness of breath  Cardiovascular: Negative for chest pain, palpitations, leg swelling.   Gastrointestinal: Positive for diarrhea. Negative for abdominal pain, constipation, nausea, vomiting.   Endocrine: Negative for cold intolerance, heat intolerance.   Genitourinary: Negative for dysuria, frequency.   Musculoskeletal: Negative for arthralgias,  myalgias.   Skin: Negative for rash  Neurological: Negative for dizziness, syncope, light-headedness.   Psychiatric/Behavioral: Negative for confusion, hallucinations, anxiety    Past Medical History:   Diagnosis Date    Hyperlipidemia     Hypertension     Obesity     Sleep apnea      Past Surgical History:   Procedure Laterality Date    achilies tendon       Family History   Problem Relation Age of Onset    Hypertension Father     Acne Neg Hx     Melanoma Neg Hx      Social History     Socioeconomic History    Marital status:      Spouse name: Not on file    Number of children: Not on file    Years of education: Not on file    Highest education level: Not on file   Occupational History     Employer: [employer]   Social Needs    Financial resource strain: Not on file    Food insecurity:     Worry: Not on file     Inability: Not on file    Transportation needs:     Medical: Not on file     Non-medical: Not on file   Tobacco Use    Smoking status: Never Smoker    Smokeless tobacco: Never Used   Substance and Sexual Activity    Alcohol use: No    Drug use: No    Sexual activity: Yes     Partners: Female   Lifestyle    Physical activity:     Days per week: Not on file     Minutes per session: Not on file    Stress: Not on file   Relationships    Social connections:     Talks on phone: Not on file     Gets together: Not on file     Attends Spiritism service: Not on file     Active member of club or organization: Not on file     Attends meetings of clubs or organizations: Not on file     Relationship status: Not on file   Other Topics Concern    Not on file   Social History Narrative    Not on file       Medications  No current facility-administered medications on file prior to encounter.      Current Outpatient Medications on File Prior to Encounter   Medication Sig Dispense Refill    amLODIPine (NORVASC) 10 MG tablet TAKE ONE TABLET BY MOUTH DAILY 90 tablet 3    pravastatin  (PRAVACHOL) 20 MG tablet TAKE ONE TABLET BY MOUTH DAILY 90 tablet 3    valsartan-hydrochlorothiazide (DIOVAN-HCT) 320-25 mg per tablet TAKE ONE TABLET BY MOUTH DAILY 90 tablet 3    albuterol (PROAIR HFA) 90 mcg/actuation inhaler Inhale 2 puffs into the lungs every 6 (six) hours as needed for Wheezing or Shortness of Breath. Rescue 18 g 2    benzonatate (TESSALON) 100 MG capsule Take 1 capsule (100 mg total) by mouth 3 (three) times daily as needed for Cough. 30 capsule 0    clindamycin-tretinoin (ZIANA) gel       fluocinonide (LIDEX) 0.05 % external solution AAA scalp qd and can use on hand and elbows followed by moisturizer prn flare 60 mL 3       Allergies  Keflex [cephalexin]    Physical Examination  Temp:  [99 °F (37.2 °C)]   Pulse:  [68-96]   Resp:  [22-24]   BP: (120-146)/(61-81)   SpO2:  [90 %-100 %]     Gen: NAD, conversant  Head: NC, AT  Eyes: PERRLA, EOMI  Throat: MMM, OP clear  CV: RRR, no M/R/G, no peripheral edema, no JVD  Resp: coarse bilateral breath sounds, no increased work of breathing on 2L NC. No wheezing   GI: Soft, NT, ND, +BS  Ext: MAEW, no c/c/e  Neuro: AAOx3, CN grossly intact, no focal neurologic deficits  Psychiatry: Normal mood, normal affect    Laboratory:  Recent Labs   Lab 04/01/20  1442 04/01/20  1825   WBC 6.26  --    LYMPH 14.2*  0.9*  --    HGB 11.6*  --    HCT 37.0* 36   *  --      Recent Labs   Lab 04/01/20  1825      K 3.2*   CL 97   CO2 24   BUN 22*   CREATININE 1.6*   *   CALCIUM 8.8     Recent Labs   Lab 04/01/20  1825   ALKPHOS 60   ALT 61*   AST 35   ALBUMIN 2.7*   PROT 7.8   BILITOT 0.4      Recent Labs     04/01/20  1442 04/01/20  1825   FERRITIN 3,114*  --    CRP  --  220.6*   LDH  --  529*   TROPONINI 0.008  --    LACTATE 1.7  --        All labs within the last 24 hours were reviewed.     Microbiology:  Lab Results   Component Value Date    [id number] Detected (A) 03/24/2020       Microbiology Results (last 7 days)     ** No results found for  the last 168 hours. **            Imaging  ECG Results          EKG 12-lead (Final result)  Result time 04/01/20 15:21:32    Final result by Interface, Lab In [facility] (04/01/20 15:21:32)                 Narrative:    Test Reason : R68.89,    Vent. Rate : 077 BPM     Atrial Rate : 077 BPM     P-R Int : 148 ms          QRS Dur : 106 ms      QT Int : 374 ms       P-R-T Axes : 062 065 -47 degrees     QTc Int : 423 ms      Normal sinus rhythm  Baseline wander  Nonspecific ST and/or T wave abnormalities  When compared with ECG of 04-AUG-2016 15:51,  Non-specific change in ST segment in Inferior leads  Confirmed by MIKA MARIE MD ([id number]) on 4/1/2020 3:21:26 PM    Referred By: [email or web address]   SELF           Confirmed By:MIKA MARIE MD                              No results found for this or any previous visit.    X-Ray Chest AP Portable  Narrative: EXAMINATION:  XR CHEST AP PORTABLE    CLINICAL HISTORY:  Suspected Covid-19 Virus Infection;    TECHNIQUE:  Single frontal portable view of the chest was performed.    COMPARISON:  January 26, 2016    FINDINGS:  The cardiac silhouette is exaggerated by AP portable technique and submaximal inspiration.  Pulmonary vascularity is not increased.  There are patchy airspace opacities bilaterally with a basilar predominance.  Although other etiologies such as edema can not be completely excluded, the findings are concerning for infection.  There is no large pleural effusion or pneumothorax.  Visualized osseous structures are intact.  Impression: Patchy bilateral airspace opacities with a basilar predominance, concerning for infection.    Electronically signed by: Ginny Colmenares MD  Date:    04/01/2020  Time:    15:52      All imaging within the last 24 hours was reviewed.       Assessment and Plan:    Active Hospital Problems    Diagnosis  POA    Pneumonia due to COVID-19 virus [U07.1, J12.89]  Yes      Resolved Hospital Problems   No resolved problems to display.       Suspected Covid-19  Virus Infection  Person Under Investigation (PUI) for COVID-19  - COVID-19 testing: Collection Date: 3/24/2020 Collection Time:   3:07 PM  - Infection Control notified    - Isolation:   - Airborne and Droplet Precautions  - Surgical mask on patient   - N95 masks must be fit tested, wear eye protection  - 20 second hand hygiene   - Limit visitors per hospital policy   - Consolidate lab draws, nursing care, and interventions    - Diagnostics: (Lymphopenia, hyponatremia, hyperferritinemia, elevated troponin, elevated d-dimer, age, and comorbidities are significant predictors of poor clinical outcome)   - CBC:   + lymphopenia       trend Q48hrs  - CMP:        NOEMY with creatinine 1.6  trend Q48hrs  - Procalcitonin: ordered  - D-dimer:  ordered repeat prior to discharge  - Ferritin:  3114  repeat prior to discharge   - CRP:        220.6  trend Q48hrs  - LDH:   529  repeat prior to discharge  - BNP:   ordered  - Troponin:  0.008   - ECG:   NSR at 77 bpm, Qtc 423   - rapid Flu:  ordered   - RIP only if BMT/solid transplant: n/a   - Legionella antigen: ordered   - Blood culture x2: ordered   - Sputum culture: ordered   - CXR:   Patchy bilateral airspace opacities with a basilar predominance   - UA and culture: ordered   - CPK:   504    - Management:   Bundle care as able to maintain isolation & minimize in/out of room   - Supplemental O2 to maintain SpO2 92%-96%   if requiring 6L NC or higher, place on nonrebreather and discuss case with MICU   - Telemetry & continuous pulse oximetry    - If wheezing   - albuterol inhaler 2-4 puff Q6hr PRN    - ipratropium daily    - acetaminophen 650mg PO Q6hr PRN fever   - loperamide PRN for viral diarrhea  - Empiric antibiotics per likely source & patient allergies    - CAP: x 5 day course (d/c early if low concern for bacterial co-infection)  Ceftriaxone 1g IV Q24hrs            Azithromycin 500mg IV day #1, then 250mg PO daily x4 days     If azithromycin is not available, start  doxycycline                 If MRSA risk factors, add Vancomycin IV (PharmD consult)   - Investigational Treatment Protocol: (if patient meets criteria)   [email or web address]%20and%20Resources/[email or web address]_[email or web address]_Protocol.[email or web address]     - statin: atorvastatin 40mg po daily (if CPK WNL)    - start HCQ 400mg PO BID x1 day, then 400mg PO daily x 4 days   (check glucose 6 phosphate dehydrogenase (NOT G6PD Quant), ECG at start & 48hrs, and start Qshift POCT glucose)    Safety notes:   - Avoid NIPPV (CPAP/BiPAP) to prevent aerosolization, use on a case-by-case basis if in neg pressure room   - Cautious use of NSAIDS for fever per WHO recommendations (3/16/2020)   - No new ACEi/ARB start or discontinuation of chronic med unless hypotensive (Esler et al. Journal of Hypertension 2020, 38:000-000)   - Careful use of steroids in the absence of other indications (shock, ARDS)   - Fluid sparing resuscitation, avoid maintenance fluids    Acute Hypoxemic Respiratory Failure    - will treat for CAP as well as COVID-19 for now with levofloxacin (allergy to cephalosporin documented)  - Glucose 6 Phosphate Dehydrogenase ordered in case hydroxychloroquine use is desired in the future (given high ferritin, high LDH, although age not >55)     Diabetes Mellitus, Type II  Not on any medications at home, previous A1c 6.8  Presented with glucose of 405, anion gap 15    - ordered urinalysis to check for ketones  - Bariatric clears until glucose lower than 250  - started insulin detemir 10 units and MDSSI for now  - will avoid large amounts of IVF due to COVID-19, given 500cc bolus  - diabetic diet  - A1c ordered    NOEMY  Baseline creatinine 1.3, up to 1.6 on admission. Suspect prerenal due to glucosuria.     - 500cc bolus as above, avoiding large amounts of IV fluids  - recheck with CMP tomorrow morning  - urinalysis ordered    Hypertension  Home medications: amlodipine 10mg daily, valsartan-HCTZ 320-25mg  "daily    - continue amlodipine  - holding valsartan for NOEMY  - holding HCTZ as suspect patient is dehydrated     Hyperlipidemia  - holding home statin with CK >500      Advance Care Planning  Goals of care, counseling/discussion   Advance Care Planning   Full code         If patient transitions to Comfort-Focused Care, please place "Nurse Communication: End of Life Care, family members allowed to visit, including spouse/partner and adult children [please list names]. Please ask family to visit as a group and leave as a group.         VTE High Risk Prophylaxis: enoxaparin 40mg sq QHS @ 2100 (bundled care) if GFR >30    Patient's chronic/stable medical conditions noted in the assessment above will be managed with the patient's home medications as tolerated.     Shauna Valderrama, DO  Internal Medicine, PGY-3      "

## 2020-04-02 NOTE — PLAN OF CARE
04/02/20 1602   Discharge Assessment   Assessment Type Discharge Planning Assessment   Confirmed/corrected address and phone number on facesheet? Yes   Assessment information obtained from? Patient   Expected Length of Stay (days) 3   Communicated expected length of stay with patient/caregiver yes   Prior to hospitilization cognitive status: Alert/Oriented   Prior to hospitalization functional status: Independent   Current cognitive status: Alert/Oriented   Current Functional Status: Independent   Lives With spouse;child(brunilda), dependent;child(brunilda), adult   Able to Return to Prior Arrangements yes   Is patient able to care for self after discharge? Yes   Readmission Within the Last 30 Days no previous admission in last 30 days   Patient currently being followed by outpatient case management? No   Patient currently receives any other outside agency services? No   Equipment Currently Used at Home CPAP   Do you have any problems affording any of your prescribed medications? No   Is the patient taking medications as prescribed? yes   Does the patient have transportation home? Yes   Transportation Anticipated family or friend will provide  (wife)   Does the patient receive services at the [facility] Clinic? No   Discharge Plan A Home;Home with family   Discharge Plan B Home;Home with family   DME Needed Upon Discharge  other (see comments)  (tbd)   Patient/Family in Agreement with Plan yes     CM spoke with via phone for Discharge Planning Assessment.  Per patient,  patient lives with spouse and children in a(n) [facility] with 1 steps to enter.   Per patient, patient was independent with ADLS and used no DME for ambulation.  Per patient, the patient will have assistance from family upon discharge.  Discharge Planning Booklet given to patient/family and discussed.  All questions addressed.       PCP:  Edis Alexander MD      Pharmacy:    [id number] - [place] [id number]  [facility] [address]  Phone: [phone] Fax: [phone]    [facility] - [place], [place] - [address] [facility]   [address] [facility]   [facility] [address]  [facility] [address]  Phone: [phone] Fax: [phone]    [facility] - [place] - [address] [facility], [address]  [address] [facility] [address]  [facility] [address]  Phone: [phone] Fax: [phone]        Emergency Contacts:  Extended Emergency Contact Information  Primary Emergency Contact: Bree Tobias  Address: [address] [place] [address]            [place] [address] [facility]  Home Phone: [phone]  Mobile Phone: [phone]  Relation: Spouse      Insurance:    Payor: BLUE CROSS BLUE [employer]IELD / Plan: BCBS ALL OUT OF STATE / Product Type: PPO /       Lizzeth Monte RN  Case Management  Ext: [phone]  04/02/2020  4:06 PM

## 2020-04-02 NOTE — PROGRESS NOTES
Hospital Medicine  Progress Note  [facility]      Patient Name: Deepak Tobias  MRN:  [id number]  Hospital Medicine Team: [facility]OSP MED X Kenny Hzael DO  Date of Admission:  4/1/2020     Length of Stay:  LOS: 1 day       Principal Problem:  Pneumonia due to COVID-19 virus      Hospital Course:  Patient admitted for acute resp failure due to COVID infection, hypoglycemia, and NOEMY.    Interval History:     Patient reports shortness of breath at rest as well as cough. Still having diarrhea that he reported on admit. Requiring 6 L O2.    Review of Systems:  Respiratory: +SOB, cough  Cardiovascular: -CP, palp  GI: +diarrhea, -nausea    Inpatient Medications:    Current Facility-Administered Medications:     acetaminophen tablet 650 mg, 650 mg, Oral, Q4H PRN, Shauna Valderrama, DO    albuterol inhaler 2 puff, 2 puff, Inhalation, Q6H PRN, Shauna Valderrama, DO    amLODIPine tablet 10 mg, 10 mg, Oral, Daily, Shauna Valderrama, , 10 mg at 04/02/20 0931    benzonatate capsule 100 mg, 100 mg, Oral, TID PRN, Shauna Valderrama, DO    dextrose 10% (D10W) Bolus, 12.5 g, Intravenous, PRN, Shauna Valderrama, DO    dextrose 10% (D10W) Bolus, 25 g, Intravenous, PRN, Shauna Valderrama, DO    dextrose 10% (D10W) Bolus, 12.5 g, Intravenous, PRN, Shauna Valderrama, DO    dextrose 10% (D10W) Bolus, 25 g, Intravenous, PRN, Shauna Valderrama, DO    enoxaparin injection 40 mg, 40 mg, Subcutaneous, Daily, Shauna Valderrama, DO, 40 mg at 04/01/20 2223    glucagon (human recombinant) injection 1 mg, 1 mg, Intramuscular, PRN, Shauna Valderrama,     glucose chewable tablet 16 g, 16 g, Oral, PRN, Shauna Valderrama, DO    glucose chewable tablet 24 g, 24 g, Oral, PRN, Shauna Valderrama, DO    insulin aspart U-100 pen 1-10 Units, 1-10 Units, Subcutaneous, QID (AC + HS) PRN, Shauna Valderrama, DO, 4 Units at 04/02/20 0106    insulin detemir  "U-100 pen 15 Units, 15 Units, Subcutaneous, QHS, Kenny Hazel,     levoFLOXacin tablet 750 mg, 750 mg, Oral, Daily, Shauna Valderrama, , 750 mg at 04/02/20 0931    loperamide capsule 2 mg, 2 mg, Oral, Q6H PRN, Shauna Valderrama DO    melatonin tablet 6 mg, 6 mg, Oral, Nightly PRN, Shauna Valderrama DO    ondansetron disintegrating tablet 8 mg, 8 mg, Oral, Q8H PRN, Shauna Valderrama DO    senna-docusate 8.6-50 mg per tablet 1 tablet, 1 tablet, Oral, BID PRN, Shauna Valderrama DO    sodium chloride 0.9% flush 10 mL, 10 mL, Intravenous, PRN, Briana Mtz MD      Physical Exam:      Intake/Output Summary (Last 24 hours) at 4/2/2020 1819  Last data filed at 4/1/2020 1836  Gross per 24 hour   Intake 150 ml   Output --   Net 150 ml     Wt Readings from Last 3 Encounters:   04/01/20 114.7 kg (252 lb 12.8 oz)   03/13/19 127.8 kg (281 lb 12 oz)   02/06/18 126.9 kg (279 lb 12.2 oz)       BP (!) 157/89   Pulse 66   Temp 97.6 °F (36.4 °C)   Resp 18   Ht 5' 8" (1.727 m)   Wt 114.7 kg (252 lb 12.8 oz)   SpO2 98%   BMI 38.44 kg/m²     GEN: NAD, conversant  Resp: coarse bilateral breath sounds, no wheezes or rales, normal work of breathing, on O2  CV: RRR, no m/r/g, no edema  GI: soft, NTND  Skin: no rash    Laboratory:  Lab Results   Component Value Date    [id number] Detected (A) 03/24/2020       Recent Labs   Lab 04/01/20  1442 04/01/20  1825 04/02/20  0325   WBC 6.26  --  5.91   LYMPH 14.2*  0.9*  --  21.0  1.2   HGB 11.6*  --  10.9*   HCT 37.0* 36 35.1*   *  --  423*     Recent Labs   Lab 04/01/20 1825 04/01/20 2055 04/02/20 0324     --  133*   K 3.2*  --  3.5   CL 97  --  96   CO2 24  --  25   BUN 22*  --  19   CREATININE 1.6*  --  1.4   *  --  315*   CALCIUM 8.8  --  8.3*   MG  --  2.1 2.0   PHOS  --   --  2.3*     Recent Labs   Lab 04/01/20 1825 04/02/20 0324   ALKPHOS 60 60   ALT 61* 54*   AST 35 33   ALBUMIN 2.7* 2.5*   PROT 7.8 7.2 "   BILITOT 0.4 0.4        Recent Labs     04/01/20  1442 04/01/20  1825 04/01/20 2055   DDIMER  --   --  0.99*   FERRITIN 3,114*  --   --    CRP  --  220.6*  --    LDH  --  529*  --    BNP  --   --  <10   TROPONINI 0.008  --   --    CPK  --  504*  --        All labs within the last 24 hours were reviewed.     Microbiology:  Microbiology Results (last 7 days)     Procedure Component Value Units Date/Time    Blood culture [[id number]] Collected:  04/01/20 2045    Order Status:  Completed Specimen:  Blood from Peripheral, Hand, Left Updated:  04/02/20 1115     Blood Culture, Routine No Growth to date    Blood culture [[id number]] Collected:  04/01/20 2056    Order Status:  Completed Specimen:  Blood from Peripheral, Antecubital, Right Updated:  04/02/20 1115     Blood Culture, Routine No Growth to date    Culture, Respiratory with Gram Stain [[id number]] Collected:  04/01/20 2353    Order Status:  Completed Specimen:  Respiratory from Sputum Updated:  04/02/20 0035     Gram Stain (Respiratory) <10 epithelial cells per low power field.     Gram Stain (Respiratory) Rare WBC's     Gram Stain (Respiratory) Many Gram positive cocci     Gram Stain (Respiratory) Moderate Gram positive rods     Gram Stain (Respiratory) Few Gram negative rods    Influenza A & B by Molecular [[id number]] Collected:  04/01/20 2057    Order Status:  Completed Specimen:  Nasopharyngeal Swab Updated:  04/01/20 2149     Influenza A, Molecular Negative     Influenza B, Molecular Negative     Flu A & B Source NP            Imaging  ECG Results          EKG 12-lead (Final result)  Result time 04/01/20 15:21:32    Final result by Interface, Lab In [facility] (04/01/20 15:21:32)                 Narrative:    Test Reason : R68.89,    Vent. Rate : 077 BPM     Atrial Rate : 077 BPM     P-R Int : 148 ms          QRS Dur : 106 ms      QT Int : 374 ms       P-R-T Axes : 062 065 -47 degrees     QTc Int : 423 ms      Normal sinus rhythm  Baseline wander  Nonspecific ST  and/or T wave abnormalities  When compared with ECG of 04-AUG-2016 15:51,  Non-specific change in ST segment in Inferior leads  Confirmed by MIKA MARIE MD ([id number]) on 4/1/2020 3:21:26 PM    Referred By: [place]ON   SELF           Confirmed By:MIKA MARIE MD                              No results found for this or any previous visit.    X-Ray Chest AP Portable  Narrative: EXAMINATION:  XR CHEST AP PORTABLE    CLINICAL HISTORY:  Suspected Covid-19 Virus Infection;    TECHNIQUE:  Single frontal portable view of the chest was performed.    COMPARISON:  January 26, 2016    FINDINGS:  The cardiac silhouette is exaggerated by AP portable technique and submaximal inspiration.  Pulmonary vascularity is not increased.  There are patchy airspace opacities bilaterally with a basilar predominance.  Although other etiologies such as edema can not be completely excluded, the findings are concerning for infection.  There is no large pleural effusion or pneumothorax.  Visualized osseous structures are intact.  Impression: Patchy bilateral airspace opacities with a basilar predominance, concerning for infection.    Electronically signed by: Ginny Colmenares MD  Date:    04/01/2020  Time:    15:52      All imaging within the last 24 hours was reviewed.     Assessment and Plan:    Active Hospital Problems    Diagnosis  POA    *Pneumonia due to COVID-19 virus [U07.1, J12.89]  Yes    NOEMY (acute kidney injury) [N17.9]  Unknown    Hypokalemia [E87.6]  Unknown    Acute hypoxemic respiratory failure [J96.01]  Unknown    Type 2 diabetes mellitus with hyperglycemia, without long-term current use of insulin [E11.65]  Yes    Hyperlipidemia [E78.5]  Yes    Hypertension [I10]  Yes    Obesity [E66.9]  Yes    Sleep apnea [G47.30]  Yes      Resolved Hospital Problems   No resolved problems to display.       Covid-19 Virus Infection  Person Under Investigation (PUI) for COVID-19  - COVID-19 testing: Positive on 3/26  - Infection Control  notified     - Isolation:   - Airborne and Droplet Precautions  - Surgical mask on patient   - N95 masks must be fit tested, wear eye protection  - 20 second hand hygiene              - Limit visitors per hospital policy              - Consolidate lab draws, nursing care, and interventions     - Diagnostics: (Lymphopenia, hyponatremia, hyperferritinemia, elevated troponin, elevated d-dimer, age, and comorbidities are significant predictors of poor clinical outcome)              - CBC:+ lymphopenia                        trend Q48hrs  - CMP:  NOEMY with creatinine 1.6            trend Q48hrs  - Procalcitonin:              - D-dimer: 0.99            repeat prior to discharge  - Ferritin: 3114             repeat prior to discharge   - CRP: 220.6               trend Q48hrs  - LDH:529                   repeat prior to discharge  - BNP: wnl                          - Troponin:0.008              - ECG: NSR at 77 bpm, Qtc 423              - rapid Flu: neg              - RIP only if BMT/solid transplant: n/a              - Legionella antigen: pending               - Blood culture x2: NGTD              - Sputum culture: n/a              - CXR: Patchy bilateral airspace opacities with a basilar   predominance              - UA and culture: pending               - CPK: 504     - Management:   Bundle care as able to maintain isolation & minimize in/out of room   - Supplemental O2 to maintain SpO2 92%-96%   if requiring 6L NC or higher, place on nonrebreather and discuss case with MICU              - Telemetry & continuous pulse oximetry               - If wheezing   - albuterol inhaler 2-4 puff Q6hr PRN    - ipratropium daily               - acetaminophen 650mg PO Q6hr PRN fever              - loperamide PRN for viral diarrhea  - Empiric antibiotics per likely source & patient allergies                          - CAP: x 5 day course (d/c early if low concern for bacterial co-infection)  Ceftriaxone 1g IV Q24hrs                                       Azithromycin 500mg IV day #1, then 250mg PO daily x4 days                                      If azithromycin is not available, start doxycycline                                      If MRSA risk factors, add Vancomycin IV (PharmD consult)              - Investigational Treatment Protocol: (if patient meets criteria)   [email or web address]%[email or web address]%20Resources/[email or web address]_[email or web address]_Protocol.[email or web address]                           - statin: atorvastatin 40mg po daily (if CPK WNL)                          - start HCQ 400mg PO BID x1 day, then 400mg PO daily x 4 days   (check glucose 6 phosphate dehydrogenase (NOT G6PD Quant), ECG at start & 48hrs, and start Qshift POCT glucose)     Safety notes:              - Avoid NIPPV (CPAP/BiPAP) to prevent aerosolization, use on a case-by-case basis if in neg pressure room              - Cautious use of NSAIDS for fever per WHO recommendations (3/16/2020)              - No new ACEi/ARB start or discontinuation of chronic med unless hypotensive (Esler et al. Journal of Hypertension 2020, 38:000-000)              - Careful use of steroids in the absence of other indications (shock, ARDS)              - Fluid sparing resuscitation, avoid maintenance fluids     Acute Hypoxemic Respiratory Failure     - will treat for CAP as well as COVID-19 for now with levofloxacin (allergy to cephalosporin documented)  - Glucose 6 Phosphate Dehydrogenase ordered in case hydroxychloroquine use is desired in the future (given high ferritin, high LDH, although age not >55)                Diabetes Mellitus, Type II  Not on any medications at home, previous A1c 6.8  Presented with glucose of 405, anion gap 15  New A1C 9     - increase detemir to 15 units nightly as BG still high   - LDSSI   - diabetic diet  - A1c ordered     NOEMY  Baseline creatinine 1.3, up to 1.6 on admission. Suspect prerenal due to glucosuria.      - 500cc bolus as above, avoiding  large amounts of IV fluids  - Improved with fluids  - Encourage PO intake     Hypertension  Home medications: amlodipine 10mg daily, valsartan-HCTZ 320-25mg daily     - continue amlodipine  - holding valsartan for NOEMY  - holding HCTZ as suspect patient is dehydrated      Hyperlipidemia  - holding home statin with CK >500        Advance Care Planning  Goals of care, counseling/discussion   Advance Care Planning   Full code    VTE High Risk Prophylaxis: enoxaparin 40mg sq QHS @ 2100 (bundled care) if GFR >30    Patient's chronic/stable medical conditions noted in the problem list above will be managed with the patient's home medications as tolerated.       Kenny Hazel, [facility] Medicine  Pager: [phone]

## 2020-04-02 NOTE — PROGRESS NOTES
"Nutrition-Related Diabetes Education      Time Spent: 5 mins    Learners: Nurse, patient    Current HbA1c: 9.4    Is patient aware of their A1c and their goal A1c?      yes    Home diabetes medication(s): No home medications, pre-diabetic    Nutrition Education with handouts: No handouts- remotely working due to Covid19    Comments:  Pt was unreachable. Spoke with nurse and pt is very lethargic today since he was admitted to the hospital late last night with Covid19. I explained to the nurse that the pt has a high A1C level and needs diabetic education. She would relay the message to the pt and when the pt is ready and more stable, I will speak with the pt. I can also provide the diet ed with his follow up.    Pt is 5'8" , 114.7 Kg and BMI of 38.44.  His A1c is 9.4 and Glu on 4/2 was 233.   Pt is on a diabetic diet with 2000 kcal      Barriers to Learning: unable to speak to person. Remotely working due to Covid19 and pt did not  phone. Resting.     Follow up:4/9/20    Please consult as needed.  Thank you!  "

## 2020-04-02 NOTE — PLAN OF CARE
Problem: Adult Inpatient Plan of Care  Goal: Plan of Care Review  Outcome: Ongoing, Progressing     Problem: Adult Inpatient Plan of Care  Goal: Patient-Specific Goal (Individualization)  Outcome: Ongoing, Progressing     Problem: Adult Inpatient Plan of Care  Goal: Absence of Hospital-Acquired Illness or Injury  Outcome: Ongoing, Progressing     Problem: Adult Inpatient Plan of Care  Goal: Optimal Comfort and Wellbeing  Outcome: Ongoing, Progressing   Patient remains free from injury or fall. O2 therapy continued. Tolerating solid PO. Cardiac rhythm Blood glucose monitored. Isolation maintained Will continue to monitor.

## 2020-04-02 NOTE — PLAN OF CARE
04/02/20 1602   Post-Acute Status   Post-Acute Authorization Other   Other Status No Post-Acute Service Needs   Discharge Delays (!) Procedure Scheduling (IR, OR, Labs, Echo, Cath, Echo, EEG)  (work up still in progress)   Discharge Plan   Discharge Plan A Home;Home with family   Discharge Plan B Home;Home with family     Lizzeth Monte RN  Case Management  Ext: [phone]  04/02/2020  4:08 PM

## 2020-04-02 NOTE — PLAN OF CARE
Problem: Adult Inpatient Plan of Care  Goal: Plan of Care Review  Outcome: Ongoing, Progressing  Flowsheets (Taken 4/2/2020 0334)  Plan of Care Reviewed With: patient     Problem: Diabetes Comorbidity  Goal: Blood Glucose Level Within Desired Range  Outcome: Ongoing, Progressing   Patient aaox4; VSS. Pt remains afebrile within shift. On 2L NC; reports dyspnea on exertion. Tele monitor in place. CBG elevated in the 300s; scheduled and SS insulin given. No pain complaints. All questions and concerns addressed. No acute events overnight. Fall and COVID precautions maintained. See chart for full assessment and VS info; will continue to monitor.

## 2020-04-03 LAB
POCT GLUCOSE: 166 MG/DL (ref 70–110)
POCT GLUCOSE: 199 MG/DL (ref 70–110)
POCT GLUCOSE: 218 MG/DL (ref 70–110)
POCT GLUCOSE: 230 MG/DL (ref 70–110)

## 2020-04-03 PROCEDURE — 99232 PR SUBSEQUENT HOSPITAL CARE,LEVL II: ICD-10-PCS | Mod: ,,, | Performed by: INTERNAL MEDICINE

## 2020-04-03 PROCEDURE — 63600175 PHARM REV CODE 636 W HCPCS: Performed by: STUDENT IN AN ORGANIZED HEALTH CARE EDUCATION/TRAINING PROGRAM

## 2020-04-03 PROCEDURE — 11000001 HC ACUTE MED/SURG PRIVATE ROOM

## 2020-04-03 PROCEDURE — 99232 SBSQ HOSP IP/OBS MODERATE 35: CPT | Mod: ,,, | Performed by: INTERNAL MEDICINE

## 2020-04-03 PROCEDURE — 25000003 PHARM REV CODE 250: Performed by: STUDENT IN AN ORGANIZED HEALTH CARE EDUCATION/TRAINING PROGRAM

## 2020-04-03 PROCEDURE — 25000003 PHARM REV CODE 250: Performed by: INTERNAL MEDICINE

## 2020-04-03 RX ORDER — ALBUTEROL SULFATE 90 UG/1
2 AEROSOL, METERED RESPIRATORY (INHALATION) EVERY 6 HOURS PRN
Qty: 18 G | Refills: 2 | Status: SHIPPED | OUTPATIENT
Start: 2020-04-03 | End: 2022-12-27

## 2020-04-03 RX ORDER — VALSARTAN AND HYDROCHLOROTHIAZIDE 320; 25 MG/1; MG/1
1 TABLET, FILM COATED ORAL DAILY
Qty: 90 TABLET | Refills: 3 | Status: SHIPPED | OUTPATIENT
Start: 2020-04-03 | End: 2020-04-14

## 2020-04-03 RX ORDER — VALSARTAN 320 MG/1
320 TABLET ORAL DAILY
Qty: 30 TABLET | Refills: 11 | Status: SHIPPED | OUTPATIENT
Start: 2020-04-03 | End: 2020-05-19

## 2020-04-03 RX ORDER — LANCETS
1 EACH MISCELLANEOUS 2 TIMES DAILY WITH MEALS
Qty: 100 EACH | Refills: 11 | Status: SHIPPED | OUTPATIENT
Start: 2020-04-03 | End: 2021-04-13 | Stop reason: SDUPTHER

## 2020-04-03 RX ORDER — DEXTROSE 4 G
1 TABLET,CHEWABLE ORAL DAILY
Qty: 1 EACH | Refills: 0 | Status: SHIPPED | OUTPATIENT
Start: 2020-04-03 | End: 2023-11-28

## 2020-04-03 RX ORDER — LANCING DEVICE
1 EACH MISCELLANEOUS 2 TIMES DAILY WITH MEALS
Qty: 1 EACH | Refills: 0 | Status: SHIPPED | OUTPATIENT
Start: 2020-04-03 | End: 2021-04-13 | Stop reason: SDUPTHER

## 2020-04-03 RX ORDER — INSULIN GLARGINE 100 [IU]/ML
20 INJECTION, SOLUTION SUBCUTANEOUS NIGHTLY
Qty: 1 BOX | Refills: 15 | Status: SHIPPED | OUTPATIENT
Start: 2020-04-03 | End: 2020-05-19

## 2020-04-03 RX ORDER — VALSARTAN 160 MG/1
320 TABLET ORAL DAILY
Status: DISCONTINUED | OUTPATIENT
Start: 2020-04-03 | End: 2020-04-06 | Stop reason: HOSPADM

## 2020-04-03 RX ADMIN — AMLODIPINE BESYLATE 10 MG: 10 TABLET ORAL at 09:04

## 2020-04-03 RX ADMIN — LEVOFLOXACIN 750 MG: 750 TABLET, FILM COATED ORAL at 09:04

## 2020-04-03 RX ADMIN — ENOXAPARIN SODIUM 40 MG: 100 INJECTION SUBCUTANEOUS at 10:04

## 2020-04-03 RX ADMIN — INSULIN ASPART 4 UNITS: 100 INJECTION, SOLUTION INTRAVENOUS; SUBCUTANEOUS at 09:04

## 2020-04-03 RX ADMIN — INSULIN ASPART 2 UNITS: 100 INJECTION, SOLUTION INTRAVENOUS; SUBCUTANEOUS at 10:04

## 2020-04-03 RX ADMIN — VALSARTAN 320 MG: 160 TABLET ORAL at 05:04

## 2020-04-03 NOTE — PLAN OF CARE
Problem: Adult Inpatient Plan of Care  Goal: Plan of Care Review  Outcome: Ongoing, Progressing  Flowsheets (Taken 4/3/2020 0412)  Plan of Care Reviewed With: patient     Problem: Diabetes Comorbidity  Goal: Blood Glucose Level Within Desired Range  Outcome: Ongoing, Progressing     Patient aaox4; VSS. Pt remains afebrile within shift. On 2L NC; tele monitor in place. No pain complaints. All questions and concerns addressed. No acute events overnight. Fall and COVID precautions maintained. See chart for full assessment and VS info; will continue to monitor.

## 2020-04-03 NOTE — PLAN OF CARE
Problem: Fall Injury Risk  Goal: Absence of Fall and Fall-Related Injury  Outcome: Ongoing, Progressing     Problem: Adult Inpatient Plan of Care  Goal: Plan of Care Review  Outcome: Ongoing, Progressing     Problem: Adult Inpatient Plan of Care  Goal: Optimal Comfort and Wellbeing  Outcome: Ongoing, Progressing     Problem: Adult Inpatient Plan of Care  Goal: Readiness for Transition of Care  Outcome: Ongoing, Progressing     Problem: Diabetes Comorbidity  Goal: Blood Glucose Level Within Desired Range  Outcome: Ongoing, Progressing  Patient remains free from injury or fall. SOB with exertion. P0povhcic continued. Cardiac rhythm and blood glucose monitored. Plan=DC home with oxygen. Will continue to monitor.

## 2020-04-03 NOTE — NURSING
Exertional SpO2 Evaluation on Room Air  Room Air SpO2 on Exertion: (!) 84 %  Pulse: 92 bpm    Exertional SpO2 Evaluation on O2  SpO2 During Exertion on O2: (!) 89 %  Heart Rate on O2: 76 bpm  Exertion O2 LPM: 2 LPM    SpO2 On Recovery  SpO2 on Recovery: 92 %  Recovery Heart Rate: 77 bpm  Recovery O2 LPM: 3 LPM  Home O2 Eval Comments: 3

## 2020-04-04 PROBLEM — J15.3 PNEUMONIA DUE TO GROUP B STREPTOCOCCUS: Status: ACTIVE | Noted: 2020-04-04

## 2020-04-04 LAB
ALBUMIN SERPL BCP-MCNC: 2.6 G/DL (ref 3.5–5.2)
ALP SERPL-CCNC: 58 U/L (ref 55–135)
ALT SERPL W/O P-5'-P-CCNC: 55 U/L (ref 10–44)
ANION GAP SERPL CALC-SCNC: 12 MMOL/L (ref 8–16)
ANISOCYTOSIS BLD QL SMEAR: SLIGHT
AST SERPL-CCNC: 31 U/L (ref 10–40)
BACTERIA SPEC AEROBE CULT: ABNORMAL
BACTERIA SPEC AEROBE CULT: ABNORMAL
BASOPHILS # BLD AUTO: ABNORMAL K/UL (ref 0–0.2)
BASOPHILS NFR BLD: 0 % (ref 0–1.9)
BILIRUB SERPL-MCNC: 0.6 MG/DL (ref 0.1–1)
BUN SERPL-MCNC: 14 MG/DL (ref 6–20)
CALCIUM SERPL-MCNC: 8.8 MG/DL (ref 8.7–10.5)
CHLORIDE SERPL-SCNC: 99 MMOL/L (ref 95–110)
CO2 SERPL-SCNC: 25 MMOL/L (ref 23–29)
CREAT SERPL-MCNC: 1 MG/DL (ref 0.5–1.4)
CRP SERPL-MCNC: 49.2 MG/L (ref 0–8.2)
DIFFERENTIAL METHOD: ABNORMAL
EOSINOPHIL # BLD AUTO: ABNORMAL K/UL (ref 0–0.5)
EOSINOPHIL NFR BLD: 1 % (ref 0–8)
ERYTHROCYTE [DISTWIDTH] IN BLOOD BY AUTOMATED COUNT: 12 % (ref 11.5–14.5)
EST. GFR  (AFRICAN AMERICAN): >60 ML/MIN/1.73 M^2
EST. GFR  (NON AFRICAN AMERICAN): >60 ML/MIN/1.73 M^2
GLUCOSE SERPL-MCNC: 213 MG/DL (ref 70–110)
GRAM STN SPEC: ABNORMAL
HCT VFR BLD AUTO: 35.1 % (ref 40–54)
HGB BLD-MCNC: 10.9 G/DL (ref 14–18)
HYPOCHROMIA BLD QL SMEAR: ABNORMAL
IMM GRANULOCYTES # BLD AUTO: ABNORMAL K/UL (ref 0–0.04)
IMM GRANULOCYTES NFR BLD AUTO: ABNORMAL % (ref 0–0.5)
LYMPHOCYTES # BLD AUTO: ABNORMAL K/UL (ref 1–4.8)
LYMPHOCYTES NFR BLD: 17 % (ref 18–48)
MAGNESIUM SERPL-MCNC: 1.7 MG/DL (ref 1.6–2.6)
MCH RBC QN AUTO: 28 PG (ref 27–31)
MCHC RBC AUTO-ENTMCNC: 31.1 G/DL (ref 32–36)
MCV RBC AUTO: 90 FL (ref 82–98)
MONOCYTES # BLD AUTO: ABNORMAL K/UL (ref 0.3–1)
MONOCYTES NFR BLD: 9 % (ref 4–15)
NEUTROPHILS NFR BLD: 73 % (ref 38–73)
NRBC BLD-RTO: 0 /100 WBC
OVALOCYTES BLD QL SMEAR: ABNORMAL
PHOSPHATE SERPL-MCNC: 3 MG/DL (ref 2.7–4.5)
PLATELET # BLD AUTO: 522 K/UL (ref 150–350)
PMV BLD AUTO: 9.9 FL (ref 9.2–12.9)
POCT GLUCOSE: 170 MG/DL (ref 70–110)
POCT GLUCOSE: 182 MG/DL (ref 70–110)
POCT GLUCOSE: 192 MG/DL (ref 70–110)
POCT GLUCOSE: 215 MG/DL (ref 70–110)
POIKILOCYTOSIS BLD QL SMEAR: SLIGHT
POLYCHROMASIA BLD QL SMEAR: ABNORMAL
POTASSIUM SERPL-SCNC: 3.2 MMOL/L (ref 3.5–5.1)
PROT SERPL-MCNC: 7.1 G/DL (ref 6–8.4)
RBC # BLD AUTO: 3.89 M/UL (ref 4.6–6.2)
SODIUM SERPL-SCNC: 136 MMOL/L (ref 136–145)
WBC # BLD AUTO: 7.3 K/UL (ref 3.9–12.7)

## 2020-04-04 PROCEDURE — 85007 BL SMEAR W/DIFF WBC COUNT: CPT

## 2020-04-04 PROCEDURE — 25000003 PHARM REV CODE 250: Performed by: INTERNAL MEDICINE

## 2020-04-04 PROCEDURE — 83735 ASSAY OF MAGNESIUM: CPT

## 2020-04-04 PROCEDURE — 84100 ASSAY OF PHOSPHORUS: CPT

## 2020-04-04 PROCEDURE — 99232 SBSQ HOSP IP/OBS MODERATE 35: CPT | Mod: GT,,, | Performed by: INTERNAL MEDICINE

## 2020-04-04 PROCEDURE — 11000001 HC ACUTE MED/SURG PRIVATE ROOM

## 2020-04-04 PROCEDURE — C9399 UNCLASSIFIED DRUGS OR BIOLOG: HCPCS | Performed by: INTERNAL MEDICINE

## 2020-04-04 PROCEDURE — 25000003 PHARM REV CODE 250: Performed by: STUDENT IN AN ORGANIZED HEALTH CARE EDUCATION/TRAINING PROGRAM

## 2020-04-04 PROCEDURE — 36415 COLL VENOUS BLD VENIPUNCTURE: CPT

## 2020-04-04 PROCEDURE — 99232 PR SUBSEQUENT HOSPITAL CARE,LEVL II: ICD-10-PCS | Mod: GT,,, | Performed by: INTERNAL MEDICINE

## 2020-04-04 PROCEDURE — 27000221 HC OXYGEN, UP TO 24 HOURS

## 2020-04-04 PROCEDURE — 85027 COMPLETE CBC AUTOMATED: CPT

## 2020-04-04 PROCEDURE — 63600175 PHARM REV CODE 636 W HCPCS: Performed by: STUDENT IN AN ORGANIZED HEALTH CARE EDUCATION/TRAINING PROGRAM

## 2020-04-04 PROCEDURE — 86140 C-REACTIVE PROTEIN: CPT

## 2020-04-04 PROCEDURE — 80053 COMPREHEN METABOLIC PANEL: CPT

## 2020-04-04 PROCEDURE — 63600175 PHARM REV CODE 636 W HCPCS: Performed by: INTERNAL MEDICINE

## 2020-04-04 RX ORDER — INSULIN ASPART 100 [IU]/ML
0-5 INJECTION, SOLUTION INTRAVENOUS; SUBCUTANEOUS
Status: DISCONTINUED | OUTPATIENT
Start: 2020-04-04 | End: 2020-04-06 | Stop reason: HOSPADM

## 2020-04-04 RX ORDER — POTASSIUM CHLORIDE 20 MEQ/1
40 TABLET, EXTENDED RELEASE ORAL
Status: COMPLETED | OUTPATIENT
Start: 2020-04-04 | End: 2020-04-04

## 2020-04-04 RX ORDER — INSULIN ASPART 100 [IU]/ML
2 INJECTION, SOLUTION INTRAVENOUS; SUBCUTANEOUS
Status: DISCONTINUED | OUTPATIENT
Start: 2020-04-04 | End: 2020-04-06 | Stop reason: HOSPADM

## 2020-04-04 RX ORDER — GLUCAGON 1 MG
1 KIT INJECTION
Status: DISCONTINUED | OUTPATIENT
Start: 2020-04-04 | End: 2020-04-06 | Stop reason: HOSPADM

## 2020-04-04 RX ORDER — IBUPROFEN 200 MG
16 TABLET ORAL
Status: DISCONTINUED | OUTPATIENT
Start: 2020-04-04 | End: 2020-04-06 | Stop reason: HOSPADM

## 2020-04-04 RX ORDER — IBUPROFEN 200 MG
24 TABLET ORAL
Status: DISCONTINUED | OUTPATIENT
Start: 2020-04-04 | End: 2020-04-06 | Stop reason: HOSPADM

## 2020-04-04 RX ORDER — AMOXICILLIN AND CLAVULANATE POTASSIUM 875; 125 MG/1; MG/1
1 TABLET, FILM COATED ORAL EVERY 12 HOURS
Status: DISCONTINUED | OUTPATIENT
Start: 2020-04-04 | End: 2020-04-06 | Stop reason: HOSPADM

## 2020-04-04 RX ADMIN — AMLODIPINE BESYLATE 10 MG: 10 TABLET ORAL at 09:04

## 2020-04-04 RX ADMIN — LEVOFLOXACIN 750 MG: 750 TABLET, FILM COATED ORAL at 09:04

## 2020-04-04 RX ADMIN — POTASSIUM CHLORIDE 40 MEQ: 1500 TABLET, EXTENDED RELEASE ORAL at 01:04

## 2020-04-04 RX ADMIN — POTASSIUM CHLORIDE 40 MEQ: 1500 TABLET, EXTENDED RELEASE ORAL at 09:04

## 2020-04-04 RX ADMIN — VALSARTAN 320 MG: 160 TABLET ORAL at 09:04

## 2020-04-04 RX ADMIN — AMOXICILLIN AND CLAVULANATE POTASSIUM 1 TABLET: 875; 125 TABLET, FILM COATED ORAL at 09:04

## 2020-04-04 RX ADMIN — INSULIN ASPART 4 UNITS: 100 INJECTION, SOLUTION INTRAVENOUS; SUBCUTANEOUS at 09:04

## 2020-04-04 RX ADMIN — INSULIN ASPART 2 UNITS: 100 INJECTION, SOLUTION INTRAVENOUS; SUBCUTANEOUS at 05:04

## 2020-04-04 RX ADMIN — INSULIN ASPART 2 UNITS: 100 INJECTION, SOLUTION INTRAVENOUS; SUBCUTANEOUS at 01:04

## 2020-04-04 RX ADMIN — ENOXAPARIN SODIUM 40 MG: 100 INJECTION SUBCUTANEOUS at 09:04

## 2020-04-04 RX ADMIN — INSULIN DETEMIR 20 UNITS: 100 INJECTION, SOLUTION SUBCUTANEOUS at 09:04

## 2020-04-04 NOTE — PLAN OF CARE
POC reviewed with pt at 1800. Pt on 2 L NC; SpO2 93-95. Pt verbalized understanding. Questions and concerns addressed. No acute events today. Pt progressing toward goals. Will continue to monitor. See flowsheets for full assessment and VS info.

## 2020-04-04 NOTE — PROGRESS NOTES
[facility] Medicine  Telemedicine Progress Note    Patient Name: Deepak Tobias  MRN: [id number]  Patient Class: IP- Inpatient   Admission Date: 4/1/2020  Length of Stay: 3 days  Attending Physician: Melissa Salgado MD  Primary Care Provider: Edis Alexander MD    [facility] Medicine Team: VIRTUAL [facility] MEDICINE TEAM 6 Melissa Salgado MD    This service was provided through telemedicine.  Start time:  4:20 PM  Chief complaint: hypoxia  The patient location is: 4/4 A  The patient arrived at:   Present with the patient at the time of the telemed/virtual assessment:  End time:  4:39 PM  Total time spent with patient: 19 min  I have assessed these findings virtually using telemed platform and with assistance of bedside nurse.  The attending portion of this evaluation, treatment, and documentation was performed per Melissa Salgado MD via audio only.    Subjective:     Principal Problem:Pneumonia due to COVID-19 virus    New History / Events Overnight: No significant events reported by Nursing.  Patient complains of dyspnea and nonproductive cough. Symptoms have been gradually improving since yesterday. Associated symptoms include: fatigue. Treatment thus far includes anti-tussive: effective. BP elevated.  SpO2 95% on 2 L NC  Date of initial symptoms:  3/19/2020  Data reviewed 4/4/2020:  Resp culture growing Grp B Strept. sCr improved.    Review of Systems   Constitutional: Positive for fatigue. Negative for fever.   Respiratory: Negative for cough.    Gastrointestinal: Negative for diarrhea.     Objective:     Vital Signs (Most Recent):  Temp: 97.8 °F (36.6 °C) (04/04/20 1555)  Pulse: 72 (04/04/20 1555)  Resp: 18 (04/04/20 1555)  BP: (!) 161/92 (04/04/20 1555)  SpO2: 95 % (04/04/20 1555) Vital Signs (24h Range):  Temp:  [97.8 °F (36.6 °C)-98.6 °F (37 °C)] 97.8 °F (36.6 °C)  Pulse:  [58-90] 72  Resp:  [18-20] 18  SpO2:  [93 %-96 %] 95 %  BP: (140-161)/(82-93) 161/92     Weight:  114.7 kg (252 lb 12.8 oz)  Body mass index is 38.44 kg/m².    Intake/Output Summary (Last 24 hours) at 4/4/2020 1616  Last data filed at 4/4/2020 1315  Gross per 24 hour   Intake 490 ml   Output --   Net 490 ml      Physical Exam   Constitutional: He is cooperative. No distress.   Neurological: He is alert. He is not disoriented.   Psychiatric: He has a normal mood and affect. His speech is normal. Cognition and memory are normal.       Significant Labs:  Lab Results   Component Value Date     [id number] Detected (A) 03/24/2020      Recent Labs   Lab 04/01/20 1442 04/01/20 1825 04/02/20 0325 04/04/20  0400   WBC 6.26  --  5.91 7.30   HGB 11.6*  --  10.9* 10.9*   HCT 37.0* 36 35.1* 35.1*   *  --  423* 522*     Recent Labs   Lab 04/01/20 1442 04/02/20 0325 04/04/20  0400   GRAN 75.5*  4.7 65.4  3.9 73.0   LYMPH 14.2*  0.9* 21.0  1.2 17.0*  CANCELED   MONO 4.8  0.3 8.0  0.5 9.0  CANCELED   EOS 0.1 0.2 CANCELED     Recent Labs   Lab 04/01/20 1825 04/01/20 2055 04/02/20 0324 04/04/20  0400     --  133* 136   K 3.2*  --  3.5 3.2*   CL 97  --  96 99   CO2 24  --  25 25   BUN 22*  --  19 14   CREATININE 1.6*  --  1.4 1.0   *  --  315* 213*   CALCIUM 8.8  --  8.3* 8.8   MG  --  2.1 2.0 1.7   PHOS  --   --  2.3* 3.0     Recent Labs   Lab 04/01/20 1825 04/02/20 0324 04/04/20  0400   ALKPHOS 60 60 58   ALT 61* 54* 55*   AST 35 33 31   ALBUMIN 2.7* 2.5* 2.6*   PROT 7.8 7.2 7.1   BILITOT 0.4 0.4 0.6     Recent Labs   Lab 04/01/20 1825   *   .6*     Recent Labs   Lab 04/01/20 1442 04/01/20 1825 04/01/20 2055   CPK  --  504*  --    TROPONINI 0.008  --   --    BNP  --   --  <10     Recent Labs   Lab 04/01/20 1442 04/01/20 2055   PROCAL  --  0.26*   LACTATE 1.7  --    DDIMER  --  0.99*   FERRITIN 3,114*  --    [id number]  --  Decreased Activity*     A1C:   Recent Labs   Lab 04/02/20  0324   HGBA1C 9.4*     POCT Glucose:   Recent Labs   Lab 04/04/20  0922 04/04/20  1312  04/04/20  1723   POCTGLUCOSE 215* 192* 182*     Microbiology Results (last 7 days)     Procedure Component Value Units Date/Time    Culture, Respiratory with Gram Stain [[id number]]  (Abnormal) Collected:  04/01/20 2353    Order Status:  Completed Specimen:  Respiratory from Sputum Updated:  04/04/20 1059     Respiratory Culture No S aureus or Pseudomonas isolated.      STREPTOCOCCUS AGALACTIAE (GROUP B)  Many  Beta-hemolytic streptococci are routinely susceptible to   penicillins,cephalosporins and carbapenems.  Susceptibility testing not routinely performed  Normal respiratory mila also present       Gram Stain (Respiratory) <10 epithelial cells per low power field.     Gram Stain (Respiratory) Rare WBC's     Gram Stain (Respiratory) Many Gram positive cocci     Gram Stain (Respiratory) Moderate Gram positive rods     Gram Stain (Respiratory) Few Gram negative rods    Blood culture [[id number]] Collected:  04/01/20 2056    Order Status:  Completed Specimen:  Blood from Peripheral, Antecubital, Right Updated:  04/03/20 2213     Blood Culture, Routine No Growth to date      No Growth to date      No Growth to date    Blood culture [[id number]] Collected:  04/01/20 2045    Order Status:  Completed Specimen:  Blood from Peripheral, Hand, Left Updated:  04/03/20 2213     Blood Culture, Routine No Growth to date      No Growth to date      No Growth to date    Influenza A & B by Molecular [[id number]] Collected:  04/01/20 2057    Order Status:  Completed Specimen:  Nasopharyngeal Swab Updated:  04/01/20 2149     Influenza A, Molecular Negative     Influenza B, Molecular Negative     Flu A & B Source NP          Significant Imaging:     Assessment/Plan:      Active Diagnoses:    Diagnosis Date Noted POA    PRINCIPAL PROBLEM:  Pneumonia due to COVID-19 virus [U07.1, J12.89] 04/01/2020 Yes    Pneumonia due to group B Streptococcus [J15.3] 04/04/2020 Yes    NOEMY (acute kidney injury) [N17.9] 04/01/2020 Yes    Hypokalemia  [E87.6] 04/01/2020 Yes    Acute hypoxemic respiratory failure [J96.01] 04/01/2020 Yes    Type 2 diabetes mellitus with hyperglycemia, without long-term current use of insulin [E11.65] 10/07/2015 Yes    Hyperlipidemia [E78.5]  Yes    Hypertension [I10]  Yes    Obesity [E66.9]  Yes    Sleep apnea [G47.30]  Yes      Problems Resolved During this Admission:       Overview / ICU Course:    Deepak Tobias is a 44 y.o. male with medical history significant for hypertension, hyperlipidemia, prediabetes  admitted for Pneumonia due to COVID-19 virus.    Inpatient Medications Prescribed for Management of Current Problems:     Scheduled Meds:    amLODIPine  10 mg Oral Daily    amoxicillin-clavulanate 875-125mg  1 tablet Oral Q12H    enoxaparin  40 mg Subcutaneous Daily    insulin aspart U-100  2 Units Subcutaneous TIDWM    insulin detemir U-100  20 Units Subcutaneous QHS    levoFLOXacin  750 mg Oral Daily    potassium chloride  40 mEq Oral TID    valsartan  320 mg Oral Daily     Continuous Infusions:   As Needed: acetaminophen, albuterol, benzonatate, dextrose 50%, dextrose 50%, glucagon (human recombinant), glucose, glucose, insulin aspart U-100, loperamide, melatonin, ondansetron, senna-docusate 8.6-50 mg, sodium chloride 0.9%    Assessment and Plan by Problem    Covid-19 Virus Infection  Person Under Investigation (PUI) for COVID-19  - COVID-19 testing: Positive on 3/26  - Infection Control notified     - Isolation:   - Airborne and Droplet Precautions  - Surgical mask on patient   - N95 masks must be fit tested, wear eye protection  - 20 second hand hygiene              - Limit visitors per hospital policy              - Consolidate lab draws, nursing care, and interventions     - Diagnostics: (Lymphopenia, hyponatremia, hyperferritinemia, elevated troponin, elevated d-dimer, age, and comorbidities are significant predictors of poor clinical outcome)              - CBC:+  lymphopenia                        trend Q48hrs  - CMP:  NOEMY with creatinine 1.6            trend Q48hrs  - Procalcitonin:              - D-dimer: 0.99            repeat prior to discharge  - Ferritin: 3114             repeat prior to discharge   - CRP: 220.6               trend Q48hrs  - LDH:529                   repeat prior to discharge  - BNP: wnl                          - Troponin:0.008              - ECG: NSR at 77 bpm, Qtc 423              - rapid Flu: neg              - RIP only if BMT/solid transplant: n/a              - Legionella antigen: pending               - Blood culture x2: NGTD              - Sputum culture: n/a              - CXR: Patchy bilateral airspace opacities with a basilar predominance              - UA and culture: pending               - CPK: 504     - Management:   Bundle care as able to maintain isolation & minimize in/out of room   - Supplemental O2 to maintain SpO2 92%-96%   if requiring 6L NC or higher, place on nonrebreather and discuss case with MICU              - Telemetry & continuous pulse oximetry               - If wheezing   - albuterol inhaler 2-4 puff Q6hr PRN    - ipratropium daily               - acetaminophen 650mg PO Q6hr PRN fever              - loperamide PRN for viral diarrhea  - Empiric antibiotics per likely source & patient allergies                          - CAP: x 5 day course (d/c early if low concern for bacterial co-infection)  Ceftriaxone 1g IV Q24hrs                                      Azithromycin 500mg IV day #1, then 250mg PO daily x4 days                                      If azithromycin is not available, start doxycycline                                      If MRSA risk factors, add Vancomycin IV (PharmD consult)              - Investigational Treatment Protocol: (if patient meets criteria)   [email or web address]                           - statin:  atorvastatin 40mg po daily (if CPK WNL)                          - start HCQ 400mg PO BID x1 day, then 400mg PO daily x 4 days   (check glucose 6 phosphate dehydrogenase (NOT G6PD Quant), ECG at start & 48hrs, and start Qshift POCT glucose)     Safety notes:              - Avoid NIPPV (CPAP/BiPAP) to prevent aerosolization, use on a case-by-case basis if in neg pressure room              - Cautious use of NSAIDS for fever per WHO recommendations (3/16/2020)              - No new ACEi/ARB start or discontinuation of chronic med unless hypotensive (Esler et al. Journal of Hypertension 2020, 38:000-000)              - Careful use of steroids in the absence of other indications (shock, ARDS)              - Fluid sparing resuscitation, avoid maintenance fluids    Plan:  - Continued empiric CAP coverage; Levaquin changed to Augmentin 4/4 due to Grp B Strept  - wean O2     Acute Hypoxemic Respiratory Failure  - treated for CAP as well as COVID-19 with levofloxacin (allergy to cephalosporin documented).       Diabetes Mellitus, Type II  Not on any medications at home, previous A1c 6.8  Presented with glucose of 405, anion gap 15  New A1C 9%  - increased detemir to 20 units nightly as fasting BG was still high   - diabetic diet  - Insulin orders and diabetic supplies have been sent to his outpatient pharmacy. He will need some bedside diabetic teaching prior to discharge.  - added prandial insulin 4/4     NOEMY  Baseline creatinine 1.3, up to 1.6 on admission. Suspect prerenal due to glucosuria and diarrhea.   - 500cc bolus, avoiding large amounts of IV fluids  - Improved with fluids  - Encourage PO intake     Hypertension  Home medications: amlodipine 10mg daily, valsartan-HCTZ 320-25mg daily  - continue amlodipine  - resume valsartan  - Patient advised to hold HCTZ at discharge until follow-up with PCP due to hyponatremia. Rx for just valsartan sent to pharmacy      Hyperlipidemia  - holding home statin with CK >500    HIGH  RISK CONDITION(S):   Patient has a condition that poses threat to life and bodily function: Severe Respiratory Distress and Acute Renal Failure     Discharge plan and follow up  Home or Self Care  ADRIENNE 4/8/2020  Previous admission:       Goals of Care:  The patient's likely prognosis, which is good has been discussed with patient. The patient's values and preferences for future care and at the very end of life are to focus on resumption of previous function. Accordingly, we have decided that the best plan to meet the patient's goals includes continuing with treatment  Code Status: Full Code  Comfort Only: No  Hospice: No    Diet: Diet diabetic [facility]; 2000 Calorie  GI Prophylaxis: Not indicated  Significant LDAs:   IV Access Type: Peripheral  Urinary Catheter Indication if present: Patient Does Not Have Urinary Catheter  Other Lines/Tubes/Drains:    VTE Risk Mitigation (From admission, onward)         Ordered     enoxaparin injection 40 mg  Daily      04/01/20 2042     IP VTE LOW RISK PATIENT  Once      04/01/20 1847              Melissa Salgado MD  Department of Hospital Medicine   [facility]

## 2020-04-04 NOTE — PROGRESS NOTES
Hospital Medicine  Progress Note  [facility]      Patient Name: Deepak Tobias  MRN:  [id number]  Hospital Medicine Team: [facility] MEDICINE TEAM 6 Kenny Hazel DO  Date of Admission:  4/1/2020     Length of Stay:  LOS: 3 days       Principal Problem:  Pneumonia due to COVID-19 virus      Hospital Course:  Patient admitted for acute resp failure due to COVID infection, hypoglycemia, and NOEMY.    Interval History:     O2 sat improved to 2 L on rest. Patient reports improvement in breathing. Diarrhea improving. BG slightly elevated this morning.    Review of Systems:  Respiratory: +SOB, cough  Cardiovascular: -CP, palp  GI: -diarrhea, -nausea    Inpatient Medications:    Current Facility-Administered Medications:     acetaminophen tablet 650 mg, 650 mg, Oral, Q4H PRN, Shauna Valderrama, DO    albuterol inhaler 2 puff, 2 puff, Inhalation, Q6H PRN, Shauna Valderrama, DO    amLODIPine tablet 10 mg, 10 mg, Oral, Daily, Shauna Valderrama, , 10 mg at 04/03/20 0925    benzonatate capsule 100 mg, 100 mg, Oral, TID PRN, Shauna Valderrama, DO    dextrose 10% (D10W) Bolus, 12.5 g, Intravenous, PRN, Shauna Valderrama, DO    dextrose 10% (D10W) Bolus, 25 g, Intravenous, PRN, Shauna Valderrama, DO    dextrose 10% (D10W) Bolus, 12.5 g, Intravenous, PRN, Shauna Valderrama, DO    dextrose 10% (D10W) Bolus, 25 g, Intravenous, PRN, Shauna Valderrama, DO    enoxaparin injection 40 mg, 40 mg, Subcutaneous, Daily, Shauna Valderrama, DO, 40 mg at 04/03/20 2246    glucagon (human recombinant) injection 1 mg, 1 mg, Intramuscular, PRN, Shauna Valderrama, DO    glucose chewable tablet 16 g, 16 g, Oral, PRN, Shauna Valderrama, DO    glucose chewable tablet 24 g, 24 g, Oral, PRN, Shauna Valderrama, DO    insulin aspart U-100 pen 1-10 Units, 1-10 Units, Subcutaneous, QID (AC + HS) PRN, Shauna Valderrama, DO, 2 Units at 04/03/20  "2249    insulin detemir U-100 pen 20 Units, 20 Units, Subcutaneous, QHS, Kenny Hazel, DO, 20 Units at 04/03/20 2249    levoFLOXacin tablet 750 mg, 750 mg, Oral, Daily, Shauna Valderrama, , 750 mg at 04/03/20 0925    loperamide capsule 2 mg, 2 mg, Oral, Q6H PRN, Shauna Valderrama DO    melatonin tablet 6 mg, 6 mg, Oral, Nightly PRN, Shauna Valderrama DO    ondansetron disintegrating tablet 8 mg, 8 mg, Oral, Q8H PRN, Shauna Valderrama DO    senna-docusate 8.6-50 mg per tablet 1 tablet, 1 tablet, Oral, BID PRN, Shauna Valderrama DO    sodium chloride 0.9% flush 10 mL, 10 mL, Intravenous, PRN, Briana Mtz MD    valsartan tablet 320 mg, 320 mg, Oral, Daily, Kenny Hazel, , 320 mg at 04/03/20 1721      Physical Exam:    No intake or output data in the 24 hours ending 04/04/20 0355  Wt Readings from Last 3 Encounters:   04/01/20 114.7 kg (252 lb 12.8 oz)   03/13/19 127.8 kg (281 lb 12 oz)   02/06/18 126.9 kg (279 lb 12.2 oz)       BP (!) 159/89 (BP Location: Right arm, Patient Position: Lying)   Pulse (!) 58   Temp 98.6 °F (37 °C) (Oral)   Resp 18   Ht 5' 8" (1.727 m)   Wt 114.7 kg (252 lb 12.8 oz)   SpO2 96%   BMI 38.44 kg/m²     GEN: NAD, conversant  Resp: coarse bilateral breath sounds, no wheezes or rales, normal work of breathing, on O2  CV: RRR, no m/r/g, no edema  GI: soft, NTND  Skin: no rash    Laboratory:  Lab Results   Component Value Date    [id number] Detected (A) 03/24/2020       Recent Labs   Lab 04/01/20  1442 04/01/20  1825 04/02/20  0325   WBC 6.26  --  5.91   LYMPH 14.2*  0.9*  --  21.0  1.2   HGB 11.6*  --  10.9*   HCT 37.0* 36 35.1*   *  --  423*     Recent Labs   Lab 04/01/20  1825 04/01/20 2055 04/02/20 0324     --  133*   K 3.2*  --  3.5   CL 97  --  96   CO2 24  --  25   BUN 22*  --  19   CREATININE 1.6*  --  1.4   *  --  315*   CALCIUM 8.8  --  8.3*   MG  --  2.1 2.0   PHOS  --   --  2.3*     Recent Labs "   Lab 04/01/20 1825 04/02/20  0324   ALKPHOS 60 60   ALT 61* 54*   AST 35 33   ALBUMIN 2.7* 2.5*   PROT 7.8 7.2   BILITOT 0.4 0.4        Recent Labs     04/01/20  1442 04/01/20 1825 04/01/20 2055   DDIMER  --   --  0.99*   FERRITIN 3,114*  --   --    CRP  --  220.6*  --    LDH  --  529*  --    BNP  --   --  <10   TROPONINI 0.008  --   --    CPK  --  504*  --        All labs within the last 24 hours were reviewed.     Microbiology:  Microbiology Results (last 7 days)     Procedure Component Value Units Date/Time    Blood culture [[id number]] Collected:  04/01/20 2056    Order Status:  Completed Specimen:  Blood from Peripheral, Antecubital, Right Updated:  04/03/20 2213     Blood Culture, Routine No Growth to date      No Growth to date      No Growth to date    Blood culture [[id number]] Collected:  04/01/20 2045    Order Status:  Completed Specimen:  Blood from Peripheral, Hand, Left Updated:  04/03/20 2213     Blood Culture, Routine No Growth to date      No Growth to date      No Growth to date    Culture, Respiratory with Gram Stain [[id number]] Collected:  04/01/20 2353    Order Status:  Completed Specimen:  Respiratory from Sputum Updated:  04/03/20 1008     Respiratory Culture Further report to follow     Gram Stain (Respiratory) <10 epithelial cells per low power field.     Gram Stain (Respiratory) Rare WBC's     Gram Stain (Respiratory) Many Gram positive cocci     Gram Stain (Respiratory) Moderate Gram positive rods     Gram Stain (Respiratory) Few Gram negative rods    Influenza A & B by Molecular [[id number]] Collected:  04/01/20 2057    Order Status:  Completed Specimen:  Nasopharyngeal Swab Updated:  04/01/20 2149     Influenza A, Molecular Negative     Influenza B, Molecular Negative     Flu A & B Source NP            Imaging  ECG Results          EKG 12-lead (Final result)  Result time 04/01/20 15:21:32    Final result by Interface, Lab In [facility] (04/01/20 15:21:32)                 Narrative:    Test  Reason : R68.89,    Vent. Rate : 077 BPM     Atrial Rate : 077 BPM     P-R Int : 148 ms          QRS Dur : 106 ms      QT Int : 374 ms       P-R-T Axes : 062 065 -47 degrees     QTc Int : 423 ms      Normal sinus rhythm  Baseline wander  Nonspecific ST and/or T wave abnormalities  When compared with ECG of 04-AUG-2016 15:51,  Non-specific change in ST segment in Inferior leads  Confirmed by MIKA MARIE MD ([id number]) on 4/1/2020 3:21:26 PM    Referred By: TIMBOERR   SELF           Confirmed By:MIKA MARIE MD                              No results found for this or any previous visit.    X-Ray Chest AP Portable  Narrative: EXAMINATION:  XR CHEST AP PORTABLE    CLINICAL HISTORY:  Suspected Covid-19 Virus Infection;    TECHNIQUE:  Single frontal portable view of the chest was performed.    COMPARISON:  January 26, 2016    FINDINGS:  The cardiac silhouette is exaggerated by AP portable technique and submaximal inspiration.  Pulmonary vascularity is not increased.  There are patchy airspace opacities bilaterally with a basilar predominance.  Although other etiologies such as edema can not be completely excluded, the findings are concerning for infection.  There is no large pleural effusion or pneumothorax.  Visualized osseous structures are intact.  Impression: Patchy bilateral airspace opacities with a basilar predominance, concerning for infection.    Electronically signed by: Ginny Colmenares MD  Date:    04/01/2020  Time:    15:52      All imaging within the last 24 hours was reviewed.     Assessment and Plan:    Active Hospital Problems    Diagnosis  POA    *Pneumonia due to COVID-19 virus [U07.1, J12.89]  Yes    NOEMY (acute kidney injury) [N17.9]  Unknown    Hypokalemia [E87.6]  Unknown    Acute hypoxemic respiratory failure [J96.01]  Unknown    Type 2 diabetes mellitus with hyperglycemia, without long-term current use of insulin [E11.65]  Yes    Hyperlipidemia [E78.5]  Yes    Hypertension [I10]  Yes    Obesity  [E66.9]  Yes    Sleep apnea [G47.30]  Yes      Resolved Hospital Problems   No resolved problems to display.       Covid-19 Virus Infection  Person Under Investigation (PUI) for COVID-19  - COVID-19 testing: Positive on 3/26  - Infection Control notified     - Isolation:   - Airborne and Droplet Precautions  - Surgical mask on patient   - N95 masks must be fit tested, wear eye protection  - 20 second hand hygiene              - Limit visitors per hospital policy              - Consolidate lab draws, nursing care, and interventions     - Diagnostics: (Lymphopenia, hyponatremia, hyperferritinemia, elevated troponin, elevated d-dimer, age, and comorbidities are significant predictors of poor clinical outcome)              - CBC:+ lymphopenia                        trend Q48hrs  - CMP:  NOEMY with creatinine 1.6            trend Q48hrs  - Procalcitonin:              - D-dimer: 0.99            repeat prior to discharge  - Ferritin: 3114             repeat prior to discharge   - CRP: 220.6               trend Q48hrs  - LDH:529                   repeat prior to discharge  - BNP: wnl                          - Troponin:0.008              - ECG: NSR at 77 bpm, Qtc 423              - rapid Flu: neg              - RIP only if BMT/solid transplant: n/a              - Legionella antigen: pending               - Blood culture x2: NGTD              - Sputum culture: n/a              - CXR: Patchy bilateral airspace opacities with a basilar   predominance              - UA and culture: pending               - CPK: 504     - Management:   Bundle care as able to maintain isolation & minimize in/out of room   - Supplemental O2 to maintain SpO2 92%-96%   if requiring 6L NC or higher, place on nonrebreather and discuss case with MICU              - Telemetry & continuous pulse oximetry               - If wheezing   - albuterol inhaler 2-4 puff Q6hr PRN    - ipratropium daily               - acetaminophen 650mg PO Q6hr PRN fever               - loperamide PRN for viral diarrhea  - Empiric antibiotics per likely source & patient allergies                          - CAP: x 5 day course (d/c early if low concern for bacterial co-infection)  Ceftriaxone 1g IV Q24hrs                                      Azithromycin 500mg IV day #1, then 250mg PO daily x4 days                                      If azithromycin is not available, start doxycycline                                      If MRSA risk factors, add Vancomycin IV (PharmD consult)              - Investigational Treatment Protocol: (if patient meets criteria)   [email or web address]                           - statin: atorvastatin 40mg po daily (if CPK WNL)                          - start HCQ 400mg PO BID x1 day, then 400mg PO daily x 4 days   (check glucose 6 phosphate dehydrogenase (NOT G6PD Quant), ECG at start & 48hrs, and start Qshift POCT glucose)     Safety notes:              - Avoid NIPPV (CPAP/BiPAP) to prevent aerosolization, use on a case-by-case basis if in neg pressure room              - Cautious use of NSAIDS for fever per WHO recommendations (3/16/2020)              - No new ACEi/ARB start or discontinuation of chronic med unless hypotensive (Esler et al. Journal of Hypertension 2020, 38:000-000)              - Careful use of steroids in the absence of other indications (shock, ARDS)              - Fluid sparing resuscitation, avoid maintenance fluids     Acute Hypoxemic Respiratory Failure     - will treat for CAP as well as COVID-19 for now with levofloxacin (allergy to cephalosporin documented). Abx has not yet been sent to pharmacy (Praful Valderrama)                  Diabetes Mellitus, Type II  Not on any medications at home, previous A1c 6.8  Presented with glucose of 405, anion gap 15  New A1C 9     - increased detemir to 20 units nightly as fasting BG still high   - LDSSI   - diabetic  diet  - Insulin orders and diabetic supplies have been sent to his outpatient pharmacy. He will need some bedside diabetic teaching prior to discharge     NOEMY  Baseline creatinine 1.3, up to 1.6 on admission. Suspect prerenal due to glucosuria.      - 500cc bolus as above, avoiding large amounts of IV fluids  - Improved with fluids  - Encourage PO intake     Hypertension  Home medications: amlodipine 10mg daily, valsartan-HCTZ 320-25mg daily     - continue amlodipine  - resume valsartan  - Patient advised to hold HCTZ at discharge until follow-up with PCP due to hyponatremia. Rx for just valsartan sent to pharmacy      Hyperlipidemia  - holding home statin with CK >500        Advance Care Planning  Goals of care, counseling/discussion   Advance Care Planning   Full code    VTE High Risk Prophylaxis: enoxaparin 40mg sq QHS @ 2100 (bundled care) if GFR >30    Patient's chronic/stable medical conditions noted in the problem list above will be managed with the patient's home medications as tolerated.       Kenny Hazel, [facility]  Pager: [phone]

## 2020-04-04 NOTE — CONSULTS
Deepak Tobias  has been accepted for transfer to [facility] and will be followed through telemedicine services beginning 04/04/20  at 7am.    Sheree Bellamy

## 2020-04-05 LAB
BACTERIA BLD CULT: NORMAL
BACTERIA BLD CULT: NORMAL
POCT GLUCOSE: 156 MG/DL (ref 70–110)
POCT GLUCOSE: 182 MG/DL (ref 70–110)
POCT GLUCOSE: 183 MG/DL (ref 70–110)
POCT GLUCOSE: 193 MG/DL (ref 70–110)

## 2020-04-05 PROCEDURE — 11000001 HC ACUTE MED/SURG PRIVATE ROOM

## 2020-04-05 PROCEDURE — 99232 SBSQ HOSP IP/OBS MODERATE 35: CPT | Mod: ,,, | Performed by: INTERNAL MEDICINE

## 2020-04-05 PROCEDURE — 25000003 PHARM REV CODE 250: Performed by: INTERNAL MEDICINE

## 2020-04-05 PROCEDURE — 63600175 PHARM REV CODE 636 W HCPCS: Performed by: STUDENT IN AN ORGANIZED HEALTH CARE EDUCATION/TRAINING PROGRAM

## 2020-04-05 PROCEDURE — 99232 PR SUBSEQUENT HOSPITAL CARE,LEVL II: ICD-10-PCS | Mod: ,,, | Performed by: INTERNAL MEDICINE

## 2020-04-05 PROCEDURE — 25000003 PHARM REV CODE 250: Performed by: STUDENT IN AN ORGANIZED HEALTH CARE EDUCATION/TRAINING PROGRAM

## 2020-04-05 RX ADMIN — AMLODIPINE BESYLATE 10 MG: 10 TABLET ORAL at 07:04

## 2020-04-05 RX ADMIN — ENOXAPARIN SODIUM 40 MG: 100 INJECTION SUBCUTANEOUS at 09:04

## 2020-04-05 RX ADMIN — VALSARTAN 320 MG: 160 TABLET ORAL at 07:04

## 2020-04-05 RX ADMIN — AMOXICILLIN AND CLAVULANATE POTASSIUM 1 TABLET: 875; 125 TABLET, FILM COATED ORAL at 09:04

## 2020-04-05 RX ADMIN — LEVOFLOXACIN 750 MG: 750 TABLET, FILM COATED ORAL at 07:04

## 2020-04-05 RX ADMIN — INSULIN ASPART 2 UNITS: 100 INJECTION, SOLUTION INTRAVENOUS; SUBCUTANEOUS at 11:04

## 2020-04-05 RX ADMIN — AMOXICILLIN AND CLAVULANATE POTASSIUM 1 TABLET: 875; 125 TABLET, FILM COATED ORAL at 07:04

## 2020-04-05 RX ADMIN — INSULIN DETEMIR 20 UNITS: 100 INJECTION, SOLUTION SUBCUTANEOUS at 09:04

## 2020-04-05 RX ADMIN — INSULIN ASPART 2 UNITS: 100 INJECTION, SOLUTION INTRAVENOUS; SUBCUTANEOUS at 05:04

## 2020-04-05 RX ADMIN — INSULIN ASPART 2 UNITS: 100 INJECTION, SOLUTION INTRAVENOUS; SUBCUTANEOUS at 07:04

## 2020-04-05 NOTE — PROGRESS NOTES
[facility] Medicine  Telemedicine Progress Note    Patient Name: Deepak Tobias  MRN: [id number]  Patient Class: IP- Inpatient   Admission Date: 4/1/2020  Length of Stay: 4 days  Attending Physician: Melissa Salgado MD  Primary Care Provider: Edis Alexander MD    [facility] Medicine Team: VIRTUAL [facility] MEDICINE TEAM 6 Melissa Salgado MD    This service was provided through telemedicine.  Start time:  2:42 PM  Chief complaint: hypoxia  The patient location is: 4/4 A  The patient arrived at:   Present with the patient at the time of the telemed/virtual assessment:  End time:  2:59 PM  Total time spent with patient: 17 min  I have assessed these findings virtually using telemed platform and with assistance of bedside nurse.  The attending portion of this evaluation, treatment, and documentation was performed per Melissa Salgado MD via audio only.    Subjective:     Principal Problem:Pneumonia due to COVID-19 virus    New History / Events Overnight: No significant events reported by Nursing.  Patient complains of dyspnea. Symptoms have been gradually improving since yesterday. Associated symptoms include: fatigue. Treatment thus far includes anti-tussive: effective. BP elevated.  SpO2 95% on 2 L NC  Date of initial symptoms:  3/19/2020  Data reviewed 4/5/2020:  Resp culture growing Grp B Strept. sCr improved.    Review of Systems   Constitutional: Positive for fatigue. Negative for fever.   Respiratory: Negative for cough.    Gastrointestinal: Negative for diarrhea.     Objective:     Vital Signs (Most Recent):  Temp: 98.4 °F (36.9 °C) (04/05/20 0747)  Pulse: 87 (04/05/20 0747)  Resp: 17 (04/05/20 0747)  BP: (!) 146/89 (04/05/20 0747)  SpO2: (!) 93 % (04/05/20 0747) Vital Signs (24h Range):  Temp:  [97.8 °F (36.6 °C)-98.4 °F (36.9 °C)] 98.4 °F (36.9 °C)  Pulse:  [60-87] 87  Resp:  [17-18] 17  SpO2:  [93 %-97 %] 93 %  BP: (146-161)/(89-93) 146/89     Weight: 114.7 kg (252 lb  12.8 oz)  Body mass index is 38.44 kg/m².    Intake/Output Summary (Last 24 hours) at 4/5/2020 0945  Last data filed at 4/5/2020 0800  Gross per 24 hour   Intake 390 ml   Output --   Net 390 ml      Physical Exam   Constitutional: He is cooperative. No distress.   Neurological: He is alert. He is not disoriented.   Psychiatric: He has a normal mood and affect. His speech is normal. Cognition and memory are normal.       Significant Labs:  Lab Results   Component Value Date     [id number] Detected (A) 03/24/2020      Recent Labs   Lab 04/01/20 1442 04/01/20 1825 04/02/20 0325 04/04/20  0400   WBC 6.26  --  5.91 7.30   HGB 11.6*  --  10.9* 10.9*   HCT 37.0* 36 35.1* 35.1*   *  --  423* 522*     Recent Labs   Lab 04/01/20 1442 04/02/20 0325 04/04/20  0400   GRAN 75.5*  4.7 65.4  3.9 73.0   LYMPH 14.2*  0.9* 21.0  1.2 17.0*  CANCELED   MONO 4.8  0.3 8.0  0.5 9.0  CANCELED   EOS 0.1 0.2 CANCELED     Recent Labs   Lab 04/01/20 1825 04/01/20 2055 04/02/20 0324 04/04/20  0400     --  133* 136   K 3.2*  --  3.5 3.2*   CL 97  --  96 99   CO2 24  --  25 25   BUN 22*  --  19 14   CREATININE 1.6*  --  1.4 1.0   *  --  315* 213*   CALCIUM 8.8  --  8.3* 8.8   MG  --  2.1 2.0 1.7   PHOS  --   --  2.3* 3.0     Recent Labs   Lab 04/01/20 1825 04/02/20 0324 04/04/20  0400   ALKPHOS 60 60 58   ALT 61* 54* 55*   AST 35 33 31   ALBUMIN 2.7* 2.5* 2.6*   PROT 7.8 7.2 7.1   BILITOT 0.4 0.4 0.6     Recent Labs   Lab 04/01/20 1825 04/04/20  0400   *  --    .6* 49.2*     Recent Labs   Lab 04/01/20 1442 04/01/20 1825 04/01/20 2055   CPK  --  504*  --    TROPONINI 0.008  --   --    BNP  --   --  <10     Recent Labs   Lab 04/01/20 1442 04/01/20 2055   PROCAL  --  0.26*   LACTATE 1.7  --    DDIMER  --  0.99*   FERRITIN 3,114*  --    Q0TGZXTS  --  Decreased Activity*     A1C:   Recent Labs   Lab 04/02/20  0324   HGBA1C 9.4*     POCT Glucose:   Recent Labs   Lab 04/04/20  1723  04/04/20 2157 04/05/20  0754   POCTGLUCOSE 182* 170* 193*     Microbiology Results (last 7 days)     Procedure Component Value Units Date/Time    Blood culture [[id number]] Collected:  04/01/20 2045    Order Status:  Completed Specimen:  Blood from Peripheral, Hand, Left Updated:  04/04/20 2212     Blood Culture, Routine No Growth to date      No Growth to date      No Growth to date      No Growth to date    Blood culture [[id number]] Collected:  04/01/20 2056    Order Status:  Completed Specimen:  Blood from Peripheral, Antecubital, Right Updated:  04/04/20 2212     Blood Culture, Routine No Growth to date      No Growth to date      No Growth to date      No Growth to date    Culture, Respiratory with Gram Stain [[id number]]  (Abnormal) Collected:  04/01/20 2353    Order Status:  Completed Specimen:  Respiratory from Sputum Updated:  04/04/20 1059     Respiratory Culture No S aureus or Pseudomonas isolated.      STREPTOCOCCUS AGALACTIAE (GROUP B)  Many  Beta-hemolytic streptococci are routinely susceptible to   penicillins,cephalosporins and carbapenems.  Susceptibility testing not routinely performed  Normal respiratory mila also present       Gram Stain (Respiratory) <10 epithelial cells per low power field.     Gram Stain (Respiratory) Rare WBC's     Gram Stain (Respiratory) Many Gram positive cocci     Gram Stain (Respiratory) Moderate Gram positive rods     Gram Stain (Respiratory) Few Gram negative rods    Influenza A & B by Molecular [[id number]] Collected:  04/01/20 2057    Order Status:  Completed Specimen:  Nasopharyngeal Swab Updated:  04/01/20 2149     Influenza A, Molecular Negative     Influenza B, Molecular Negative     Flu A & B Source NP        Significant Imaging:     Assessment/Plan:      Active Diagnoses:    Diagnosis Date Noted POA    PRINCIPAL PROBLEM:  Pneumonia due to COVID-19 virus [U07.1, J12.89] 04/01/2020 Yes    Pneumonia due to group B Streptococcus [J15.3] 04/04/2020 Yes    NOEMY  (acute kidney injury) [N17.9] 04/01/2020 Yes    Hypokalemia [E87.6] 04/01/2020 Yes    Acute hypoxemic respiratory failure [J96.01] 04/01/2020 Yes    Type 2 diabetes mellitus with hyperglycemia, without long-term current use of insulin [E11.65] 10/07/2015 Yes    Hyperlipidemia [E78.5]  Yes    Hypertension [I10]  Yes    Obesity [E66.9]  Yes    Sleep apnea [G47.30]  Yes      Problems Resolved During this Admission:       Overview / ICU Course:    Deepak Tobias is a 44 y.o. male with medical history significant for hypertension, hyperlipidemia, prediabetes  admitted for Pneumonia due to COVID-19 virus.    Inpatient Medications Prescribed for Management of Current Problems:     Scheduled Meds:    amLODIPine  10 mg Oral Daily    amoxicillin-clavulanate 875-125mg  1 tablet Oral Q12H    enoxaparin  40 mg Subcutaneous Daily    insulin aspart U-100  2 Units Subcutaneous TIDWM    insulin detemir U-100  20 Units Subcutaneous QHS    valsartan  320 mg Oral Daily     Continuous Infusions:   As Needed: acetaminophen, albuterol, benzonatate, dextrose 50%, dextrose 50%, glucagon (human recombinant), glucose, glucose, insulin aspart U-100, loperamide, melatonin, ondansetron, senna-docusate 8.6-50 mg, sodium chloride 0.9%    Assessment and Plan by Problem    Covid-19 Virus Infection  Person Under Investigation (PUI) for COVID-19  - COVID-19 testing: Positive on 3/26  - Infection Control notified     - Isolation:   - Airborne and Droplet Precautions  - Surgical mask on patient   - N95 masks must be fit tested, wear eye protection  - 20 second hand hygiene              - Limit visitors per hospital policy              - Consolidate lab draws, nursing care, and interventions     - Diagnostics: (Lymphopenia, hyponatremia, hyperferritinemia, elevated troponin, elevated d-dimer, age, and comorbidities are significant predictors of poor clinical outcome)              - CBC:+ lymphopenia                        trend Q48hrs  -  CMP:  NOEMY with creatinine 1.6            trend Q48hrs  - Procalcitonin:              - D-dimer: 0.99            repeat prior to discharge  - Ferritin: 3114             repeat prior to discharge   - CRP: 220.6               trend Q48hrs  - LDH:529                   repeat prior to discharge  - BNP: wnl                          - Troponin:0.008              - ECG: NSR at 77 bpm, Qtc 423              - rapid Flu: neg              - RIP only if BMT/solid transplant: n/a              - Legionella antigen: pending               - Blood culture x2: NGTD              - Sputum culture: n/a              - CXR: Patchy bilateral airspace opacities with a basilar predominance              - UA and culture: pending               - CPK: 504     - Management:   Bundle care as able to maintain isolation & minimize in/out of room   - Supplemental O2 to maintain SpO2 92%-96%   if requiring 6L NC or higher, place on nonrebreather and discuss case with MICU              - Telemetry & continuous pulse oximetry               - If wheezing   - albuterol inhaler 2-4 puff Q6hr PRN    - ipratropium daily               - acetaminophen 650mg PO Q6hr PRN fever              - loperamide PRN for viral diarrhea  - Empiric antibiotics per likely source & patient allergies                          - CAP: x 5 day course (d/c early if low concern for bacterial co-infection)  Ceftriaxone 1g IV Q24hrs                                      Azithromycin 500mg IV day #1, then 250mg PO daily x4 days                                      If azithromycin is not available, start doxycycline                                      If MRSA risk factors, add Vancomycin IV (PharmD consult)              - Investigational Treatment Protocol: (if patient meets criteria)   [email or web address]                           - statin: atorvastatin 40mg po daily (if CPK  WNL)                          - start HCQ 400mg PO BID x1 day, then 400mg PO daily x 4 days   (check glucose 6 phosphate dehydrogenase (NOT G6PD Quant), ECG at start & 48hrs, and start Qshift POCT glucose)     Safety notes:              - Avoid NIPPV (CPAP/BiPAP) to prevent aerosolization, use on a case-by-case basis if in neg pressure room              - Cautious use of NSAIDS for fever per WHO recommendations (3/16/2020)              - No new ACEi/ARB start or discontinuation of chronic med unless hypotensive (Esler et al. Journal of Hypertension 2020, 38:000-000)              - Careful use of steroids in the absence of other indications (shock, ARDS)              - Fluid sparing resuscitation, avoid maintenance fluids    Plan:  - Continued empiric CAP coverage; Levaquin changed to Augmentin 4/4 due to Grp B Strept  - wean O2; plan for discharge with O2 if needed 4/6.     Acute Hypoxemic Respiratory Failure  - treated for CAP as well as COVID-19 with levofloxacin (allergy to cephalosporin documented).   - Antibiotic changed to Augmentin      Diabetes Mellitus, Type II  Not on any medications at home, previous A1c 6.8  Presented with glucose of 405, anion gap 15  New A1C 9%  - increased detemir to 20 units nightly as fasting BG was still high   - diabetic diet  - Insulin orders and diabetic supplies have been sent to his outpatient pharmacy. He will need some bedside diabetic teaching prior to discharge.  - added prandial insulin 4/4; plan for discharge with basal insulin and metformin.     NOEMY  Baseline creatinine 1.3, up to 1.6 on admission. Suspect prerenal due to glucosuria and diarrhea.   - 500cc bolus, avoiding large amounts of IV fluids  - Improved with fluids  - Encourage PO intake  - Resolved     Hypertension  Home medications: amlodipine 10mg daily, valsartan-HCTZ 320-25mg daily  - continue amlodipine  - resume valsartan  - Patient advised to hold HCTZ at discharge until follow-up with PCP due to  hyponatremia. Rx for just valsartan sent to pharmacy      Hyperlipidemia  - holding home statin with CK >500    HIGH RISK CONDITION(S):   Patient has a condition that poses threat to life and bodily function: Severe Respiratory Distress and Acute Renal Failure     Discharge plan and follow up  Home or Self Care  ADRIENNE 4/6/2020  Previous admission:       Goals of Care:  The patient's likely prognosis, which is good has been discussed with patient. The patient's values and preferences for future care and at the very end of life are to focus on resumption of previous function. Accordingly, we have decided that the best plan to meet the patient's goals includes continuing with treatment  Code Status: Full Code  Comfort Only: No  Hospice: No    Diet: Diet diabetic [facility]; 2000 Calorie  GI Prophylaxis: Not indicated  Significant LDAs:   IV Access Type: Peripheral  Urinary Catheter Indication if present: Patient Does Not Have Urinary Catheter  Other Lines/Tubes/Drains:    VTE Risk Mitigation (From admission, onward)         Ordered     enoxaparin injection 40 mg  Daily      04/01/20 2042     IP VTE LOW RISK PATIENT  Once      04/01/20 1847              Melissa Salgado MD  Department of Hospital Medicine   [facility]

## 2020-04-05 NOTE — PLAN OF CARE
Problem: Gas Exchange Impaired  Goal: Optimal Gas Exchange  Outcome: Ongoing, Not Progressing    [id number] Pt ambulated for 6 minutes. SpO2 90-91 on room air at rest. SpO2 87-88 on room air while ambulating.

## 2020-04-05 NOTE — PLAN OF CARE
Problem: Adult Inpatient Plan of Care  Goal: Plan of Care Review  Outcome: Ongoing, Progressing   Plan of care reviewed with pt. Pt voiced understanding. Pt AAOx4. Pt denies any c/o during the shift. No apparent distress noted. PT on 2L NC. OOB independently. Fall precautions maintained. Bed in lowest position, and locked. Call light within reach and advised to call for assistance. Side rails x 2 and slip resistant socks on at this time.

## 2020-04-06 VITALS
RESPIRATION RATE: 18 BRPM | DIASTOLIC BLOOD PRESSURE: 84 MMHG | BODY MASS INDEX: 38.31 KG/M2 | TEMPERATURE: 98 F | OXYGEN SATURATION: 95 % | HEART RATE: 74 BPM | SYSTOLIC BLOOD PRESSURE: 141 MMHG | HEIGHT: 68 IN | WEIGHT: 252.81 LBS

## 2020-04-06 PROBLEM — N17.9 AKI (ACUTE KIDNEY INJURY): Status: RESOLVED | Noted: 2020-04-01 | Resolved: 2020-04-06

## 2020-04-06 PROBLEM — E87.6 HYPOKALEMIA: Status: RESOLVED | Noted: 2020-04-01 | Resolved: 2020-04-06

## 2020-04-06 PROBLEM — J96.01 ACUTE HYPOXEMIC RESPIRATORY FAILURE: Status: RESOLVED | Noted: 2020-04-01 | Resolved: 2020-04-06

## 2020-04-06 LAB
ALBUMIN SERPL BCP-MCNC: 2.6 G/DL (ref 3.5–5.2)
ALP SERPL-CCNC: 62 U/L (ref 55–135)
ALT SERPL W/O P-5'-P-CCNC: 101 U/L (ref 10–44)
ANION GAP SERPL CALC-SCNC: 11 MMOL/L (ref 8–16)
ANISOCYTOSIS BLD QL SMEAR: SLIGHT
AST SERPL-CCNC: 67 U/L (ref 10–40)
BASOPHILS # BLD AUTO: ABNORMAL K/UL (ref 0–0.2)
BASOPHILS NFR BLD: 0 % (ref 0–1.9)
BILIRUB SERPL-MCNC: 0.9 MG/DL (ref 0.1–1)
BUN SERPL-MCNC: 12 MG/DL (ref 6–20)
CALCIUM SERPL-MCNC: 8.5 MG/DL (ref 8.7–10.5)
CHLORIDE SERPL-SCNC: 103 MMOL/L (ref 95–110)
CO2 SERPL-SCNC: 24 MMOL/L (ref 23–29)
CREAT SERPL-MCNC: 0.9 MG/DL (ref 0.5–1.4)
CRP SERPL-MCNC: 31.9 MG/L (ref 0–8.2)
DIFFERENTIAL METHOD: ABNORMAL
EOSINOPHIL # BLD AUTO: ABNORMAL K/UL (ref 0–0.5)
EOSINOPHIL NFR BLD: 1 % (ref 0–8)
ERYTHROCYTE [DISTWIDTH] IN BLOOD BY AUTOMATED COUNT: 12.2 % (ref 11.5–14.5)
EST. GFR  (AFRICAN AMERICAN): >60 ML/MIN/1.73 M^2
EST. GFR  (NON AFRICAN AMERICAN): >60 ML/MIN/1.73 M^2
GLUCOSE SERPL-MCNC: 181 MG/DL (ref 70–110)
HCT VFR BLD AUTO: 34.7 % (ref 40–54)
HGB BLD-MCNC: 10.6 G/DL (ref 14–18)
IMM GRANULOCYTES # BLD AUTO: ABNORMAL K/UL (ref 0–0.04)
IMM GRANULOCYTES NFR BLD AUTO: ABNORMAL % (ref 0–0.5)
L PNEUMO AG UR QL IA: NOT DETECTED
LYMPHOCYTES # BLD AUTO: ABNORMAL K/UL (ref 1–4.8)
LYMPHOCYTES NFR BLD: 13 % (ref 18–48)
MAGNESIUM SERPL-MCNC: 1.4 MG/DL (ref 1.6–2.6)
MCH RBC QN AUTO: 27.5 PG (ref 27–31)
MCHC RBC AUTO-ENTMCNC: 30.5 G/DL (ref 32–36)
MCV RBC AUTO: 90 FL (ref 82–98)
METAMYELOCYTES NFR BLD MANUAL: 4 %
MONOCYTES # BLD AUTO: ABNORMAL K/UL (ref 0.3–1)
MONOCYTES NFR BLD: 6 % (ref 4–15)
NEUTROPHILS NFR BLD: 76 % (ref 38–73)
NRBC BLD-RTO: 0 /100 WBC
PHOSPHATE SERPL-MCNC: 3.5 MG/DL (ref 2.7–4.5)
PLATELET # BLD AUTO: 565 K/UL (ref 150–350)
PLATELET BLD QL SMEAR: ABNORMAL
PMV BLD AUTO: 9.7 FL (ref 9.2–12.9)
POCT GLUCOSE: 154 MG/DL (ref 70–110)
POCT GLUCOSE: 161 MG/DL (ref 70–110)
POLYCHROMASIA BLD QL SMEAR: ABNORMAL
POTASSIUM SERPL-SCNC: 3.6 MMOL/L (ref 3.5–5.1)
PROT SERPL-MCNC: 6.7 G/DL (ref 6–8.4)
RBC # BLD AUTO: 3.85 M/UL (ref 4.6–6.2)
SODIUM SERPL-SCNC: 138 MMOL/L (ref 136–145)
WBC # BLD AUTO: 8.28 K/UL (ref 3.9–12.7)

## 2020-04-06 PROCEDURE — 25000003 PHARM REV CODE 250: Performed by: INTERNAL MEDICINE

## 2020-04-06 PROCEDURE — 99239 HOSP IP/OBS DSCHRG MGMT >30: CPT | Mod: ,,, | Performed by: INTERNAL MEDICINE

## 2020-04-06 PROCEDURE — 36415 COLL VENOUS BLD VENIPUNCTURE: CPT

## 2020-04-06 PROCEDURE — 25000003 PHARM REV CODE 250: Performed by: STUDENT IN AN ORGANIZED HEALTH CARE EDUCATION/TRAINING PROGRAM

## 2020-04-06 PROCEDURE — 99239 PR HOSPITAL DISCHARGE DAY,>30 MIN: ICD-10-PCS | Mod: ,,, | Performed by: INTERNAL MEDICINE

## 2020-04-06 PROCEDURE — 86140 C-REACTIVE PROTEIN: CPT

## 2020-04-06 PROCEDURE — 85027 COMPLETE CBC AUTOMATED: CPT

## 2020-04-06 PROCEDURE — 83735 ASSAY OF MAGNESIUM: CPT

## 2020-04-06 PROCEDURE — 80053 COMPREHEN METABOLIC PANEL: CPT

## 2020-04-06 PROCEDURE — 84100 ASSAY OF PHOSPHORUS: CPT

## 2020-04-06 PROCEDURE — 85007 BL SMEAR W/DIFF WBC COUNT: CPT

## 2020-04-06 RX ORDER — AMOXICILLIN AND CLAVULANATE POTASSIUM 875; 125 MG/1; MG/1
1 TABLET, FILM COATED ORAL EVERY 12 HOURS
Qty: 14 TABLET | Refills: 0 | Status: SHIPPED | OUTPATIENT
Start: 2020-04-06 | End: 2020-04-13

## 2020-04-06 RX ORDER — METFORMIN HYDROCHLORIDE 500 MG/1
500 TABLET ORAL 2 TIMES DAILY WITH MEALS
Qty: 180 TABLET | Refills: 3 | Status: SHIPPED | OUTPATIENT
Start: 2020-04-06 | End: 2021-04-09 | Stop reason: SDUPTHER

## 2020-04-06 RX ORDER — LANOLIN ALCOHOL/MO/W.PET/CERES
400 CREAM (GRAM) TOPICAL DAILY
Refills: 0 | COMMUNITY
Start: 2020-04-06 | End: 2020-04-16

## 2020-04-06 RX ADMIN — AMLODIPINE BESYLATE 10 MG: 10 TABLET ORAL at 09:04

## 2020-04-06 RX ADMIN — INSULIN ASPART 2 UNITS: 100 INJECTION, SOLUTION INTRAVENOUS; SUBCUTANEOUS at 07:04

## 2020-04-06 RX ADMIN — VALSARTAN 320 MG: 160 TABLET ORAL at 09:04

## 2020-04-06 RX ADMIN — AMOXICILLIN AND CLAVULANATE POTASSIUM 1 TABLET: 875; 125 TABLET, FILM COATED ORAL at 09:04

## 2020-04-06 NOTE — NURSING
Plan of care discussed w/pt.Pt verbalizes understanding.Pt AAOx4,VSSAF with no c/o pain.Pt discharged home via WC with all belongings.

## 2020-04-06 NOTE — DISCHARGE SUMMARY
[facility] Hospital Medicine  Telemedicine Discharge Summary      Patient Name: Deepak Tobias  MRN: [id number]  Admission Date: 4/1/2020  Hospital Length of Stay: 5 days  Discharge Date and Time: 4/6/2020  1:22 PM  Attending Physician: Melissa Salgado MD   Discharging Provider: Melissa Salgado MD  Primary Care Provider: Edis Alexander MD    [facility] Medicine Team: [facility] MEDICINE TEAM 6 Melissa Salgado MD    HPI:  44 year old male with hypertension, hyperlipidemia, prediabetes who presented to the ED on 4/1 for worsening shortness of breath and was admitted to hospital medicine. He was recently diagnosed with COVID-19 infection after he called in to his PCP on 3/24 and had drive-by testing. At that time, he complained of fever up to 102 F, loose stools, productive cough with green sputum, and shortness of breath. He had a telemedicine visit earlier in the day on 4/1 and complained of worsening shortness of breath. He checked his oxygen saturation, which went as low as 84% on room air during the telemedicine visit. He was told to present to the ED for evaluation. Upon arrival to the ED, he was found to be hypoxic with oxygen saturation 90% on room air. He was placed on 2L NC with subsequent improvement. He has had loose stools (about 3 per day) for the past several days. He has also noted anosmia.     * No surgery found *      Hospital Course:  44 y.o. male with medical history significant for hypertension, hyperlipidemia, prediabetes  admitted for Pneumonia due to COVID-19 virus. He was admitted to Hospital Medicine for treatment of suspected COVID-19 viral infection and was treated with supportive care following a comprehensive physical, radiographic, and lab evaluation tailored to the current standard of care for COVID19 at that time. Please review the admission H&P and the studies listed below for details. He improved with supportive care and was found to be suitable for  discharge home without oxygen.    On the day of discharge, isolation precautions were reviewed at length verbally. The patient was also provided with written isolation guidelines modified from the [place] Department of Health and [facility] as well as the CDC, as part of discharge paperwork. He was enrolled in the COVID-19 Home Symptom Monitoring program.    Additional details of the hospitalization include:    Covid-19 Virus Infection  Person Under Investigation (PUI) for COVID-19  - COVID-19 testing: Positive on 3/26  - Infection Control notified     - Isolation:   - Airborne and Droplet Precautions  - Surgical mask on patient   - N95 masks must be fit tested, wear eye protection  - 20 second hand hygiene              - Limit visitors per hospital policy              - Consolidate lab draws, nursing care, and interventions     - Diagnostics: (Lymphopenia, hyponatremia, hyperferritinemia, elevated troponin, elevated d-dimer, age, and comorbidities are significant predictors of poor clinical outcome)              - CBC:+ lymphopenia                        trend Q48hrs  - CMP:  NOEMY with creatinine 1.6            trend Q48hrs  - Procalcitonin:              - D-dimer: 0.99            repeat prior to discharge  - Ferritin: 3114             repeat prior to discharge   - CRP: 220.6               trend Q48hrs  - LDH:529                   repeat prior to discharge  - BNP: wnl                          - Troponin:0.008              - ECG: NSR at 77 bpm, Qtc 423              - rapid Flu: neg              - RIP only if BMT/solid transplant: n/a              - Legionella antigen: pending               - Blood culture x2: NGTD              - Sputum culture: n/a              - CXR: Patchy bilateral airspace opacities with a basilar predominance              - UA and culture: pending               - CPK: 504     - Management:   Bundle care as able to maintain isolation & minimize in/out of room   - Supplemental O2 to maintain SpO2  92%-96%   if requiring 6L NC or higher, place on nonrebreather and discuss case with MICU              - Telemetry & continuous pulse oximetry               - If wheezing   - albuterol inhaler 2-4 puff Q6hr PRN    - ipratropium daily               - acetaminophen 650mg PO Q6hr PRN fever              - loperamide PRN for viral diarrhea  - Empiric antibiotics per likely source & patient allergies                          - CAP: x 5 day course (d/c early if low concern for bacterial co-infection)  Ceftriaxone 1g IV Q24hrs                                      Azithromycin 500mg IV day #1, then 250mg PO daily x4 days                                      If azithromycin is not available, start doxycycline                                      If MRSA risk factors, add Vancomycin IV (PharmD consult)              - Investigational Treatment Protocol: (if patient meets criteria)   [email or web address]                           - statin: atorvastatin 40mg po daily (if CPK WNL)                          - start HCQ 400mg PO BID x1 day, then 400mg PO daily x 4 days   (check glucose 6 phosphate dehydrogenase (NOT G6PD Quant), ECG at start & 48hrs, and start Qshift POCT glucose)     Safety notes:              - Avoid NIPPV (CPAP/BiPAP) to prevent aerosolization, use on a case-by-case basis if in neg pressure room              - Cautious use of NSAIDS for fever per WHO recommendations (3/16/2020)              - No new ACEi/ARB start or discontinuation of chronic med unless hypotensive (Esler et al. Journal of Hypertension 2020, 38:000-000)              - Careful use of steroids in the absence of other indications (shock, ARDS)              - Fluid sparing resuscitation, avoid maintenance fluids     Plan:  - Continued empiric CAP coverage; Levaquin changed to Augmentin on 4/4 due to Grp B Strept  - wean O2; plan for discharge with O2 if  needed 4/6.     Acute Hypoxemic Respiratory Failure  - treated for CAP as well as COVID-19 with levofloxacin (allergy to cephalosporin documented).   - Antibiotic changed to Augmentin      Diabetes Mellitus, Type II  Not on any medications at home, previous A1c 6.8  Presented with glucose of 405, anion gap 15  New A1C 9%  - increased detemir to 20 units nightly as fasting BG was still high   - diabetic diet  - Insulin orders and diabetic supplies have been sent to his outpatient pharmacy. He will need some bedside diabetic teaching prior to discharge.  - added prandial insulin 4/4; plan for discharge with basal insulin and metformin.     NOEMY  Baseline creatinine 1.3, up to 1.6 on admission. Suspect prerenal due to glucosuria and diarrhea.   - 500cc bolus, avoiding large amounts of IV fluids  - Improved with fluids  - Encourage PO intake  - Resolved     Hypertension  Home medications: amlodipine 10mg daily, valsartan-HCTZ 320-25mg daily  - continue amlodipine  - resume valsartan  - Patient advised to hold HCTZ at discharge until follow-up with PCP due to hyponatremia. Rx for just valsartan sent to pharmacy      Hyperlipidemia  - holding home statin with CK >500    Consults:   Consults (From admission, onward)        Status Ordering Provider     Inpatient consult to Hospital Medicine-General  Once     Provider:  (Not yet assigned)    Completed KRISTIAN PHELAN     Inpatient consult to Infection Control (Nurse)  Once     Provider:  (Not yet assigned)    Acknowledged RUKHSANA BECK     Inpatient consult to Social Work/Case Management  Once     Provider:  (Not yet assigned)    Completed KRISTIAN PHELAN        Final Active Diagnoses:    Diagnosis Date Noted POA    PRINCIPAL PROBLEM:  Pneumonia due to COVID-19 virus [U07.1, J12.89] 04/01/2020 Yes    Pneumonia due to group B Streptococcus [J15.3] 04/04/2020 Yes    Type 2 diabetes mellitus with hyperglycemia, without long-term current use of insulin [E11.65]  10/07/2015 Yes    Hyperlipidemia [E78.5]  Yes    Hypertension [I10]  Yes    Obesity [E66.9]  Yes    Sleep apnea [G47.30]  Yes      Problems Resolved During this Admission:    Diagnosis Date Noted Date Resolved POA    NOEMY (acute kidney injury) [N17.9] 04/01/2020 04/06/2020 Yes    Hypokalemia [E87.6] 04/01/2020 04/06/2020 Yes    Acute hypoxemic respiratory failure [J96.01] 04/01/2020 04/06/2020 Yes      Discharged Condition: stable    Disposition: Home or Self Care    Follow Up:  Follow-up Information     Edis Alexander MD On 4/14/2020.    Specialty:  Internal Medicine  Why:  Hospital Follow-up 820 am telehealth visit  Contact information:  [id number]  [facility] [address]  [phone]                 Future Appointments   Date Time Provider Department Center   4/14/2020  8:20 AM Edis Alexander Jr., MD [facility]     Patient Instructions:      Ambulatory referral/consult to Diabetes Education   Standing Status: Future   Referral Priority: Routine Referral Type: Consultation   Referral Reason: Specialty Services Required   Requested Specialty: Diabetes   Number of Visits Requested: 1 Expiration Date: 04/06/21     Diet diabetic     COVID-19 Home Symptom Monitoring  - Duration (days): 14     Order Specific Question Answer Comments   Duration (days) 14      Notify your health care provider if you experience any of the following:  temperature >100.4     Notify your health care provider if you experience any of the following:  persistent nausea and vomiting or diarrhea     Notify your health care provider if you experience any of the following:  difficulty breathing or increased cough     Notify your health care provider if you experience any of the following:  severe persistent headache     Notify your health care provider if you experience any of the following:  persistent dizziness, light-headedness, or visual disturbances     Notify your health care provider if you experience any of the  "following:  increased confusion or weakness     Notify your health care provider if you experience any of the following:  worsening rash     Activity as tolerated     Medications:  Reconciled Home Medications:      Medication List      START taking these medications    amoxicillin-clavulanate 875-125mg 875-125 mg per tablet  Commonly known as:  AUGMENTIN  Take 1 tablet by mouth every 12 (twelve) hours. for 7 days     blood sugar diagnostic Strp  1 strip by Misc.(Non-Drug; Combo Route) route 2 (two) times daily with meals.     blood-glucose meter Misc  Commonly known as:  ONETOUCH VERIO SYSTEM  1 Units by Misc.(Non-Drug; Combo Route) route once daily.     insulin glargine 100 units/mL (3mL) SubQ pen  Commonly known as:  LANTUS SOLOSTAR U-100 INSULIN  Inject 20 Units into the skin every evening.     insulin lispro 200 unit/mL (3 mL) Inpn  Commonly known as:  HumaLOG KwikPen Insulin  Inject 1-5 Units into the skin before meals as needed (for high blood sugars (sliding scale)).     lancets Misc  1 lancet by Misc.(Non-Drug; Combo Route) route 2 (two) times daily with meals.     lancing device Misc  1 Device by Misc.(Non-Drug; Combo Route) route 2 (two) times daily with meals.     magnesium oxide 400 mg (241.3 mg magnesium) tablet  Commonly known as:  MAG-OX  Take 1 tablet (400 mg total) by mouth once daily. for 10 days     metFORMIN 500 MG tablet  Commonly known as:  GLUCOPHAGE  Take 1 tablet (500 mg total) by mouth 2 (two) times daily with meals.     pen needle, diabetic 32 gauge x 1/6" Ndle  Commonly known as:  NOVOFINE PLUS  1 pen by Misc.(Non-Drug; Combo Route) route 3 (three) times daily.     valsartan 320 MG tablet  Commonly known as:  DIOVAN  Take 1 tablet (320 mg total) by mouth once daily.        CHANGE how you take these medications    valsartan-hydrochlorothiazide 320-25 mg per tablet  Commonly known as:  DIOVAN-HCT  Take 1 tablet by mouth once daily. STOP THIS AND USE JUST VALSARTAN UNTIL YOU FOLLOW-UP WITH " PCP  What changed:  additional instructions        CONTINUE taking these medications    albuterol 90 mcg/actuation inhaler  Commonly known as:  PROAIR HFA  Inhale 2 puffs into the lungs every 6 (six) hours as needed for Wheezing or Shortness of Breath. Rescue     amLODIPine 10 MG tablet  Commonly known as:  NORVASC  TAKE ONE TABLET BY MOUTH DAILY     benzonatate 100 MG capsule  Commonly known as:  TESSALON  Take 1 capsule (100 mg total) by mouth 3 (three) times daily as needed for Cough.     fluocinonide 0.05 % external solution  Commonly known as:  LIDEX  AAA scalp qd and can use on hand and elbows followed by moisturizer prn flare     pravastatin 20 MG tablet  Commonly known as:  PRAVACHOL  TAKE ONE TABLET BY MOUTH DAILY     ZIANA gel  Generic drug:  clindamycin-tretinoin          Significant Diagnostic Studies:     Lab Results   Component Value Date     [id number] Detected (A) 03/24/2020      Recent Labs   Lab 04/02/20 0325 04/04/20 0400 04/06/20  0547   WBC 5.91 7.30 8.28   HGB 10.9* 10.9* 10.6*   HCT 35.1* 35.1* 34.7*   * 522* 565*     Recent Labs   Lab 04/02/20 0325 04/04/20 0400 04/06/20  0547   GRAN 65.4  3.9 73.0 76.0*   LYMPH 21.0  1.2 17.0*  CANCELED 13.0*  CANCELED   MONO 8.0  0.5 9.0  CANCELED 6.0  CANCELED   EOS 0.2 CANCELED CANCELED     Recent Labs   Lab 04/02/20 0324 04/04/20 0400 04/06/20  0547   * 136 138   K 3.5 3.2* 3.6   CL 96 99 103   CO2 25 25 24   BUN 19 14 12   CREATININE 1.4 1.0 0.9   * 213* 181*   CALCIUM 8.3* 8.8 8.5*   MG 2.0 1.7 1.4*   PHOS 2.3* 3.0 3.5     Recent Labs   Lab 04/02/20 0324 04/04/20 0400 04/06/20  0547   ALKPHOS 60 58 62   ALT 54* 55* 101*   AST 33 31 67*   ALBUMIN 2.5* 2.6* 2.6*   PROT 7.2 7.1 6.7   BILITOT 0.4 0.6 0.9     Recent Labs   Lab 04/01/20  1825 04/04/20  0400 04/06/20  0039   *  --   --    .6* 49.2* 31.9*     Recent Labs   Lab 04/01/20  1442 04/01/20  1825 04/01/20  2055   CPK  --  504*  --    TROPONINI 0.008   --   --    BNP  --   --  <10     Recent Labs   Lab 04/01/20  1442 04/01/20 2055   PROCAL  --  0.26*   LACTATE 1.7  --    DDIMER  --  0.99*   FERRITIN 3,114*  --    [id number]  --  Decreased Activity*     Recent Labs   Lab 04/02/20  0324   HGBA1C 9.4*     Microbiology Results (last 7 days)     Procedure Component Value Units Date/Time    Blood culture [[id number]] Collected:  04/01/20 2056    Order Status:  Completed Specimen:  Blood from Peripheral, Antecubital, Right Updated:  04/05/20 1634     Blood Culture, Routine No growth after 4 days    Blood culture [[id number]] Collected:  04/01/20 2045    Order Status:  Completed Specimen:  Blood from Peripheral, Hand, Left Updated:  04/05/20 1634     Blood Culture, Routine No growth after 4 days    Culture, Respiratory with Gram Stain [[id number]]  (Abnormal) Collected:  04/01/20 2353    Order Status:  Completed Specimen:  Respiratory from Sputum Updated:  04/04/20 1059     Respiratory Culture No S aureus or Pseudomonas isolated.      STREPTOCOCCUS AGALACTIAE (GROUP B)  Many  Beta-hemolytic streptococci are routinely susceptible to   penicillins,cephalosporins and carbapenems.  Susceptibility testing not routinely performed  Normal respiratory mila also present       Gram Stain (Respiratory) <10 epithelial cells per low power field.     Gram Stain (Respiratory) Rare WBC's     Gram Stain (Respiratory) Many Gram positive cocci     Gram Stain (Respiratory) Moderate Gram positive rods     Gram Stain (Respiratory) Few Gram negative rods    Influenza A & B by Molecular [[id number]] Collected:  04/01/20 2057    Order Status:  Completed Specimen:  Nasopharyngeal Swab Updated:  04/01/20 2149     Influenza A, Molecular Negative     Influenza B, Molecular Negative     Flu A & B Source NP        Pending Diagnostic Studies:     Procedure Component Value Units Date/Time    Legionella antigen, urine [[id number]] Collected:  04/01/20 2055    Order Status:  Sent Lab Status:  In process  Updated:  04/01/20 2123    Specimen:  Urine, Clean Catch     Legionella culture Sputum, Expectorated [[id number]] Collected:  04/01/20 2353    Order Status:  Sent Lab Status:  In process Updated:  04/02/20 1101    Specimen:  Sputum Expectorated         Indwelling Lines/Drains at time of discharge:  None    Time spent on the discharge of patient: 45 minutes  Patient was seen and examined on the date of discharge and determined to be suitable for discharge.  This service was provided through telemedicine.  Start time:1120  Chief complaint: COVID-19  [facility]  The patient arrived at:   Present with the patient at the time of the telemed/virtual assessment:  End time:  1131  Total time spent with patient: 11 min  I have assessed these findings virtually using telemed platform and with assistance of bedside nurse.  The attending portion of this evaluation, treatment, and documentation was performed per Melissa Salgado MD via audio only.  Additional time spent in care of the patient: counseling the patient on COVID-19 transmission, prevention, and coordinating care including speaking with case management, reviewing records, or discussing the plan of care.      Melissa Salgado MD  Department of Hospital Medicine  [facility]

## 2020-04-06 NOTE — PLAN OF CARE
04/06/20 0951   Post-Acute Status   Post-Acute Authorization Other   Other Status No Post-Acute Service Needs       No SW needs noted upon D/C. SW in contact with CM and Medical staff. Will continue to follow and offer support as needed.     Jordy Rice, SHANTA  [facility]   Ext. [phone]

## 2020-04-06 NOTE — PLAN OF CARE
Problem: Adult Inpatient Plan of Care  Goal: Plan of Care Review  Outcome: Ongoing, Progressing     Plan of care reviewed with pt. Pt voiced understanding. Pt AAOx4. Pt denies any c/o during the shift. No apparent distress noted. Educated pt on diabetic diet. Fall precautions maintained. Bed in lowest position, and locked. Call light within reach and advised to call for assistance. Side rails x 2 and slip resistant socks on at this time. TM.

## 2020-04-06 NOTE — PLAN OF CARE
04/06/20 1146   Final Note   Assessment Type Final Discharge Note   Anticipated Discharge Disposition Home   Hospital Follow Up  Appt(s) scheduled? Yes   Right Care Referral Info   Post Acute Recommendation No Care     Patient medically ready for discharge to home w/ no needs. Any necessary transport setup by . This CM scheduled or requested necessary follow-up appointments.     Future Appointments   Date Time Provider Department Center   4/14/2020  8:20 AM Edis Alexander Jr., MD [facility] RN  Case Management  Ext: [phone]  04/06/2020  11:47 AM

## 2020-04-08 ENCOUNTER — PATIENT OUTREACH (OUTPATIENT)
Dept: ADMINISTRATIVE | Facility: CLINIC | Age: 45
End: 2020-04-08

## 2020-04-08 NOTE — PROGRESS NOTES
C3 nurse attempted to contact patient. The following occurred:   C3 nurse attempted to contact Deepak for a TCC post hospital discharge follow up call. The patient is unable to conduct the call @ this time. The patient requested a callback.    The patient has a scheduled HOSFU appointment with Edis Alexander MD on 4/14/20 @ 0820. Message sent to Physician staff.

## 2020-04-08 NOTE — PATIENT INSTRUCTIONS
Pneumonia (Adult)  Pneumonia is an infection deep within the lungs. It is in the small air sacs (alveoli). Pneumonia may be caused by a virus or bacteria. Pneumonia caused by bacteria is usually treated with an antibiotic. Severe cases may need to be treated in the hospital. Milder cases can be treated at home. Symptoms usually start to get better during the first 2 days of treatment.    Home care  Follow these guidelines when caring for yourself at home:  · Rest at home for the first 2 to 3 days, or until you feel stronger. Dont let yourself get overly tired when you go back to your activities.  · Stay away from cigarette smoke - yours or other peoples.  · You may use acetaminophen or ibuprofen to control fever or pain, unless another medicine was prescribed. If you have chronic liver or kidney disease, talk with your healthcare provider before using these medicines. Also talk with your provider if youve had a stomach ulcer or gastrointestinal bleeding. Dont give aspirin to anyone younger than 18 years of age who is ill with a fever. It may cause severe liver damage.  · Your appetite may be poor, so a light diet is fine.  · Drink 6 to 8 glasses of fluids every day to make sure you are getting enough fluids. Beverages can include water, sport drinks, sodas without caffeine, juices, tea, or soup. Fluids will help loosen secretions in the lung. This will make it easier for you to cough up the phlegm (sputum). If you also have heart or kidney disease, check with your healthcare provider before you drink extra fluids.  · Take antibiotic medicine prescribed until it is all gone, even if you are feeling better after a few days.  Follow-up care  Follow up with your healthcare provider in the next 2 to 3 days, or as advised. This is to be sure the medicine is helping you get better.  If you are 65 or older, you should get a pneumococcal vaccine and a yearly flu (influenza) shot. You should also get these vaccines if  you have chronic lung disease like asthma, emphysema, or COPD. Recently, a second type of pneumonia vaccine has become available for everyone over 65 years old. This is in addition to the previous vaccine. Ask your provider about this.  When to seek medical advice  Call your healthcare provider right away if any of these occur:  · You dont get better within the first 48 hours of treatment  · Shortness of breath gets worse  · Rapid breathing (more than 25 breaths per minute)  · Coughing up blood  · Chest pain gets worse with breathing  · Fever of 100.4°F (38°C) or higher that doesnt get better with fever medicine  · Weakness, dizziness, or fainting that gets worse  · Thirst or dry mouth that gets worse  · Sinus pain, headache, or a stiff neck  · Chest pain not caused by coughing  Date Last Reviewed: 1/1/2017  © 7160-4562 [employer]. [address] [place] [address]. All rights reserved. This information is not intended as a substitute for professional medical care. Always follow your healthcare professional's instructions.

## 2020-04-14 ENCOUNTER — OFFICE VISIT (OUTPATIENT)
Dept: INTERNAL MEDICINE | Facility: CLINIC | Age: 45
End: 2020-04-14
Payer: COMMERCIAL

## 2020-04-14 ENCOUNTER — PATIENT MESSAGE (OUTPATIENT)
Dept: INTERNAL MEDICINE | Facility: CLINIC | Age: 45
End: 2020-04-14

## 2020-04-14 VITALS — WEIGHT: 245 LBS | HEIGHT: 68 IN | BODY MASS INDEX: 37.13 KG/M2

## 2020-04-14 DIAGNOSIS — U07.1 PNEUMONIA DUE TO COVID-19 VIRUS: ICD-10-CM

## 2020-04-14 DIAGNOSIS — I10 ESSENTIAL HYPERTENSION: ICD-10-CM

## 2020-04-14 DIAGNOSIS — D64.9 ANEMIA, UNSPECIFIED TYPE: ICD-10-CM

## 2020-04-14 DIAGNOSIS — R74.01 ELEVATED SGOT (AST): ICD-10-CM

## 2020-04-14 DIAGNOSIS — J12.82 PNEUMONIA DUE TO COVID-19 VIRUS: ICD-10-CM

## 2020-04-14 DIAGNOSIS — E66.01 CLASS 3 SEVERE OBESITY WITH SERIOUS COMORBIDITY AND BODY MASS INDEX (BMI) OF 40.0 TO 44.9 IN ADULT, UNSPECIFIED OBESITY TYPE: ICD-10-CM

## 2020-04-14 DIAGNOSIS — E11.65 TYPE 2 DIABETES MELLITUS WITH HYPERGLYCEMIA, WITHOUT LONG-TERM CURRENT USE OF INSULIN: ICD-10-CM

## 2020-04-14 DIAGNOSIS — E78.5 HYPERLIPIDEMIA, UNSPECIFIED HYPERLIPIDEMIA TYPE: Primary | ICD-10-CM

## 2020-04-14 PROCEDURE — 99214 OFFICE O/P EST MOD 30 MIN: CPT | Mod: 95,,, | Performed by: INTERNAL MEDICINE

## 2020-04-14 PROCEDURE — 99214 PR OFFICE/OUTPT VISIT, EST, LEVL IV, 30-39 MIN: ICD-10-PCS | Mod: 95,,, | Performed by: INTERNAL MEDICINE

## 2020-04-14 NOTE — PROGRESS NOTES
The patient location is: home   The chief complaint leading to consultation is:  COVID-19  Visit type:  Audio only  Total time spent with patient: 20 min  Each patient to whom he or she provides medical services by telemedicine is:  (1) informed of the relationship between the physician and patient and the respective role of any other health care provider with respect to management of the patient; and (2) notified that he or she may decline to receive medical services by telemedicine and may withdraw from such care at any time.    Notes:  He is a 44-year-old gentleman well known to me.  He was diagnosed with COVID March 24th and he was being followed on the phone.  He has history of diabetes mellitus type 2 which was diet controlled.  His last follow-up was March 2019.  Hemoglobin A1c at that time was 6.8.  April 1st he became short of breath and was directed to go to the hospital after being found hypoxic.  Pulse ox is 84% and he was brought in and given oxygen and hydration.  He had acute on chronic kidney disease a creatinine 1.6 he is found to be anemic with a H&H 10.6 and 34.7 on discharge his AST and ALT were elevated on discharge at 67/101 he also had low albumin 2.6 on discharge he was also severely hyperglycemic with a hemoglobin A1c of 9.4 and glucose of 405 on admission.  He was started on insulin and tolerated well.  He also suffers from obesity and is willing to and 87 lb on admission.  He was discharged on April 6th 2 home without oxygen any says he has been doing well.  He was being struck did on his use of insulin when a code happen in the hospital and the nurses instructing him had row the room.  He has been taking his long-acting insulin but has not been using his Humalog.   He has been exercising at home and says he feels pretty well.  He does get fatigued easily.  He denies any shortness of breath and only has a mild cough.  He still has some chest pain when he takes a deep breath.  Today we  reviewed his hospital chart with him including the abnormalities present or discharge.  We discussed his diabetes he says he is eating better and he says he is weight 245 lb at the current time.  He wants to know about any push himself exercising.  He has not had any hypoglycemia since says his sugars have been running pretty well.  Today we discussed the use of sliding scale insulin or short-acting insulin.  I want him to limit that use for only glucose over 200 at the current time.  And we discussed this.  He has a glucose that is elevated in a series he has been give me a call and we can instruct him on how to use that better.  He says he is eating well.  His bowels are moving normally.  He denies any visual or hearing difficulties.  Says he is in a good mood.  And he is in a safe place.      His physical exam:  His limited due to his visit being audio only.  He could not get the video working on his side.      Assessment and plan:  COVID-19  Transaminitis due to COVID  NOEMY due to COVID- mostly resolved.  He is on metformin and we will continue to monitor  Diabetes mellitus type 2 with some CKD  Obesity  Mt nurse met with albumin 2.6  Plan:  I will follow him up again in 3 weeks.  Will make this an in-person visit with labs prior.  He is going let me know how sugars are doing prior to this.  He can push himself a little bit but do not him hurting himself.  He needs to make sure he is eating well and will reassess his renal function, liver tests, albumin, and diabetes when he comes back.  His 14 days are up for self quarantine, however I recommend continuing isolation, good hand hygiene, and social distancing.

## 2020-04-15 ENCOUNTER — OUTPATIENT CASE MANAGEMENT (OUTPATIENT)
Dept: ADMINISTRATIVE | Facility: OTHER | Age: 45
End: 2020-04-15

## 2020-04-15 NOTE — PROGRESS NOTES
LMSW received referral from [place] [address] [facility] to contact patient. LMSW spoke to patient via telephone regarding referral. Patient reports he has not responded yes to SMS in a week. Patient does not have new symptoms or concerns to report. Patients he had telemedicine visit with his PCP yesterday. He believes he is better. Patient denies needing community resources.

## 2020-04-16 NOTE — PHYSICIAN QUERY
PT Name: Deepak Tobias  MR #: [id number]     Physician Query Form - Documentation Clarification      CDS/: Nader Wolf RN               Contact information:   [email or web address]    This form is a permanent document in the medical record.     Query Date: April 16, 2020    By submitting this query, we are merely seeking further clarification of documentation. Please utilize your independent clinical judgment when addressing the question(s) below.    The Medical Record reflects the following:    Clinical Findings Location in Medical Record   to ED today w/ increasing SOB starting yesterday. PT 90% SPO2 room air. PT 96% 2 L nasal cannula. PT tested positive COVID-19    Pulmonary/Chest: No stridor. No respiratory distress...    Acute Hypoxemic Respiratory Failure....  Respiratory: Positive for cough, shortness of breath.......Resp: coarse bilateral breath sounds, no increased work of breathing on 2L NC. No wheezing.....     Patient reports shortness of breath at rest as well as cough. Still having diarrhea that he reported on admit. Requiring 6 L O2......Resp: coarse bilateral breath sounds, no wheezes or rales, normal work of breathing, on O2     Patient complains of dyspnea and nonproductive cough. Symptoms have been gradually improving since yesterday. Associated symptoms include: fatigue. Treatment thus far includes anti-tussive: effective. BP elevated.      SpO2 95% on 2 L NC  Date of initial symptoms:  3/19/2020  Data reviewed 4/4/2020:  Resp culture growing Grp B Strept. sCr improved.   4/1 Triage notes, Emergency medicine, RN- Nadja    4/1 ED provider note    4/1 H&P, Hospital medicine,   Dr. Valderrama      4/2 Progress note- Hospital medicine,  Dr. Hazel      4/4 Progress note- Hospital medicine, Dr. Salgado                                                 4/1         4/1       4/1        4/2      4/2        4/2        4/3       4/3        4/1 ED vitals: RR: 22-24;   Sats: %  RA-  2LNC Flow sheet              ED clinical summary:  ED Vitals from 4/1  0000 to 4/1 22:07                                                                              Please clarify the diagnosis of __Acute Hypoxemic Respiratory Failure _______________      Provider Use Only    [  ] Acute Hypoxemic Respiratory Failure Diagnosis is ruled in and additional clinical support/ decision making indicators for the          diagnosis include (specify):_________________________________    [  ]  Acute Hypoxemic Respiratory Failure diagnosis has been ruled out    [ x ]  Acute Hypoxemic Respiratory Failure diagnosis has been ruled out, other diagnosis ruled in (specify):___________                          [ x  ]  Respiratory insufficiency                         [   ]  Hypoxic only                         [  ] Other clarification (specify): ______________  [  ] Clinically undetermined         Present on admission (POA) status:   [   ] Yes (Y)                          [  ] Clinically Undetermined (W)  [   ] No (N)                            [   ] Documentation insufficient to determine if condition is POA (U)

## 2020-04-16 NOTE — PHYSICIAN QUERY
PT Name: Deepak Tobias  MR #: [id number]     Physician Query Form - Documentation Clarification- anemia      CDS/: Nader Wolf RN               Contact information:   [email or web address]    This form is a permanent document in the medical record.     Query Date: April 16, 2020    By submitting this query, we are merely seeking further clarification of documentation. Please utilize your independent clinical judgment when addressing the question(s) below.    The Medical record reflects the following:    Supporting Clinical Findings Location in Medical Record     - Glucose 6 Phosphate Dehydrogenase ordered in case hydroxychloroquine use is desired in the future (given high ferritin, high LDH, although age not >55)       4/1 H&P                                                 4/1                                          4/2                4/4 4/6 4/1 Labs                  Labs                                                                                Doctor, Please specify diagnosis or diagnoses associated with above clinical findings.    Provider Use Only    [    ]  G6PD anemia    [    ]  Hemolytic anemia      [  x  ]  Other anemia: specify: _____COVID-19_________________    [    ]  Insignificant finding                                                                                                             [  ] Clinically Undetermined

## 2020-04-20 ENCOUNTER — PATIENT OUTREACH (OUTPATIENT)
Dept: ADMINISTRATIVE | Facility: OTHER | Age: 45
End: 2020-04-20

## 2020-04-21 ENCOUNTER — CLINICAL SUPPORT (OUTPATIENT)
Dept: DIABETES | Facility: CLINIC | Age: 45
End: 2020-04-21
Payer: COMMERCIAL

## 2020-04-21 DIAGNOSIS — E78.5 HYPERLIPIDEMIA, UNSPECIFIED HYPERLIPIDEMIA TYPE: ICD-10-CM

## 2020-04-21 DIAGNOSIS — E66.01 CLASS 3 SEVERE OBESITY WITH SERIOUS COMORBIDITY AND BODY MASS INDEX (BMI) OF 40.0 TO 44.9 IN ADULT, UNSPECIFIED OBESITY TYPE: ICD-10-CM

## 2020-04-21 DIAGNOSIS — E11.65 TYPE 2 DIABETES MELLITUS WITH HYPERGLYCEMIA, WITHOUT LONG-TERM CURRENT USE OF INSULIN: ICD-10-CM

## 2020-04-21 DIAGNOSIS — N17.9 AKI (ACUTE KIDNEY INJURY): ICD-10-CM

## 2020-04-21 PROCEDURE — G0108 DIAB MANAGE TRN  PER INDIV: HCPCS | Mod: 95,,, | Performed by: DIETITIAN, REGISTERED

## 2020-04-21 PROCEDURE — G0108 PR DIAB MANAGE TRN  PER INDIV: ICD-10-PCS | Mod: 95,,, | Performed by: DIETITIAN, REGISTERED

## 2020-04-21 NOTE — PROGRESS NOTES
Diabetes Care Specialist Virtual Visit Note   This visit was conducted using Virtual Visit/Video Technology.  It was in the patient's best interest to receive diabetes self-management education and support services in this format to prevent the exposure to COVID-19.          Diabetes Education  Author: Chitra España RD, CDE  Date: 4/21/2020    Diabetes Care Management Summary  Diabetes Education Record Assessment: Initial    Current Diabetes Risk Level: Moderate     Last A1c:   Lab Results   Component Value Date    HGBA1C 9.4 (H) 04/02/2020     Last visit with Diabetes Educator: none noted    Diabetes Type : Type II  Diabetes Diagnosis: 1-3 years      Current Treatment: Insulin, Oral Medication  Lantus 20 units;   metformin 500 mg BID              Health Maintenance was reviewed today with patient. Discussed with patient importance of routine eye exams, foot exams/foot care, blood work (i.e.: A1c, microalbumin, and lipid), dental visits, yearly flu vaccine, and pneumonia vaccine as indicated by PCP. Patient verbalized understanding.     Health Maintenance Topics with due status: Not Due       Topic Last Completion Date    Hemoglobin A1c 04/02/2020    Low Dose Statin 04/08/2020     Health Maintenance Due   Topic Date Due    Foot Exam  06/16/1985    Eye Exam  06/16/1985    TETANUS VACCINE  06/16/1993    Pneumococcal Vaccine (Medium Risk) (1 of 1 - PPSV23) 06/16/1994    Lipid Panel  03/13/2020       Nutrition  Meal Planning: (3-4 meals daily (4th meal is usually salad))  What type of beverages do you drink?: diet soda/tea  Meal Plan 24 Hour Recall - Dinner: (julián hudson)    Monitoring   Self Monitoring : (SMBG 6 times daily (pre and post prandial))  Blood Glucose Logs: Yes  (oral recall; highest: 204 mg/dL (after crawifsh, potatoes), 194 (after pizza), 189 (after banana bread);             , 146, 145, 151, 122, 117, 166, 129, 108, 102, 118, 105, 121)  In the last month, how often have you had a low  blood sugar reaction?: never  (not since hospital d/c)  What are your symptoms of low blood sugar?: (jittery, cold sweat, shaky)    Exercise   Exercise Type: starts walking/exercise routine today (hasn't recently 2/2 COVID/pneumonia dx)    Social History  Preferred Learning Method: Face to Face, Demonstration, Hands On, Reading Materials  ([facility]  Primary Support: Self, Spouse  Smoking Status: Never a Smoker  Barriers to Change: None  Learning Challenges : None  Readiness to Learn : Acceptance  Cultural Influences: No          Diabetes Education Assessment/Progress  -Diabetes Disease Process (diabetes disease process and treatment options): Discussion, Instructed, Individual Session, Written Materials Provided, Comprehends Key Points  Reviewed disease process and general progression. Discussed pt's most likely cause of recently elevated Hgb A1c. Reviewed current treatment plan and discussed all treatment options available, especially dietary and lifestyle modifications, as well as medication additions and/or changes. Discussed likelihood of eventually d/cing insulin.   Feels like earlier this year, his activity level decreased as his son's soccer practice was moved back to [place] (as opposed to at the park where he himself would jog regularly while son was at practice).    -Nutrition (Incorporating nutritional management into one's lifestyle): Discussion, Instructed, Individual Session, Written Materials Provided, Comprehends Key Points  DSM guide emailed to pt.   Reports he thinks he eats healthier when he is exercising regularly.  Emphasized importance of eliminating all SSB. Discussed SF drink options. Reviewed all carb vs non-carb containing foods and discussed the appropriate amounts of carb to have at meals vs snacks. Recommended <60 gm carb at meals and <15 gm carb at snacks. Reviewed label reading and how to determine appropriate serving sizes of specific carb containing foods.  Reviewed need to limit total/saturated fats despite lesser effect on BG increase. Encouraged carb sources primarily from whole grains, fresh/frozen fruits, and low-fat milk and yogurt. Discussed meal plans and snack ideas amenable to pt.     -Physical Activity (incorporating physical activity into one's lifestyle): Discussion, Instructed, Individual Session, Written Materials Provided, Comprehends Key Points  Discussed goals and benefits of regular PA, once cleared. Reviewed difference between active lifestyle and structured physical activity. Encouraged PA with increased heart rate for sustained duration as tolerated. Reviewed PA goals of >150 minutes weekly.     -Medications (states correct name, dose, onset, peak, duration, side effects & timing of meds): Discussion, Instructed, Individual Session, Written Materials Provided, Comprehends Key Points  Discussed timing and MOA of metformin. Discussed possible side effects and how to avoid these.  Discussed timing and MOA of long acting insulin. Reviewed need for rotation of injection sites, appropriate insulin storage, and insulin pen use. Emphasized need to take Lantus at same time daily.      -Monitoring (monitoring blood glucose/other parameters & using results): Discussion, Instructed, Individual Session, Written Materials Provided, Comprehends Key Points  Discussed goal BGs for different times of day and in relation to meals. Instructed pt to test BG twice daily at varying times: fasting and 2-hours after any meal. Reviewed need for updated BG logs for all endo, PCP, and education appts.    -Acute Complications (preventing, detecting, and treating acute complications): Discussion, Instructed, Individual Session, Written Materials Provided, Comprehends Key Points  Discussed hypoglycemia vs hyperglycemia symptoms and discussed appropriate treatments for each. Reviewed that pt is at moderate risk of hypo with current medication regimen. Discussed general vs severe  hyperglycemia and risk of DKA.    -Chronic Complications (preventing, detecting, and treating chronic complications): Discussion, Instructed, Individual Session, Written Materials Provided, Comprehends Key Points  Reviewed annual diabetes care schedule and patient priorities.    -Clinical (diabetes, other pertinent medical history, and relevant comorbidities reviewed during visit): Discussion      Hx: SUPRIYA, wears CPAP; recent covid+ with pneumonia, HLD, HTN    -Psychosocial (emotional response to diabetes): Individual Session  No negative feelings toward diabetes dx or disease management noted.    -Diabetes Distress and Support Systems: Individual Session  No distress noted.     -Behavioral (readiness for change, lifestyle practices, self-care behaviors): Individual Session  Appears motivated to make recommended changes.           Goals  Patient has selected/evaluated goals during today's session: Yes, selected    Healthy Eating: Set  (Limit carbs to <60 gm per meal)  Start Date: 04/21/20  Target Date: 07/31/20         Diabetes Care Plan/Intervention  Education Plan/Intervention: Individual Follow-Up DSMT  (f/u in 2 weeks)        Diabetes Meal Plan  Restrictions: Restricted Carbohydrate        Today's Self-Management Care Plan was developed with the patient's input and is based on barriers identified during today's assessment.    The long and short-term goals in the care plan were written with the patient/caregiver's input. The patient has agreed to work toward these goals to improve his overall diabetes control.      The patient received a copy of today's self-management plan and verbalized understanding of the care plan, goals, and all of today's instructions.      The patient was encouraged to communicate with his physician and care team regarding his condition(s) and treatment.  I provided the patient with my contact information today and encouraged him to contact me via phone or patient portal as needed.            Education Units of Time   Time Spent: 60 min

## 2020-04-28 ENCOUNTER — LAB VISIT (OUTPATIENT)
Dept: LAB | Facility: HOSPITAL | Age: 45
End: 2020-04-28
Attending: INTERNAL MEDICINE
Payer: COMMERCIAL

## 2020-04-28 DIAGNOSIS — E11.65 TYPE 2 DIABETES MELLITUS WITH HYPERGLYCEMIA, WITHOUT LONG-TERM CURRENT USE OF INSULIN: ICD-10-CM

## 2020-04-28 DIAGNOSIS — R74.01 ELEVATED SGOT (AST): ICD-10-CM

## 2020-04-28 DIAGNOSIS — E78.5 HYPERLIPIDEMIA, UNSPECIFIED HYPERLIPIDEMIA TYPE: ICD-10-CM

## 2020-04-28 DIAGNOSIS — D64.9 ANEMIA, UNSPECIFIED TYPE: ICD-10-CM

## 2020-04-28 LAB
ALBUMIN SERPL BCP-MCNC: 3.9 G/DL (ref 3.5–5.2)
ALP SERPL-CCNC: 42 U/L (ref 55–135)
ALT SERPL W/O P-5'-P-CCNC: 39 U/L (ref 10–44)
ANION GAP SERPL CALC-SCNC: 8 MMOL/L (ref 8–16)
AST SERPL-CCNC: 35 U/L (ref 10–40)
BASOPHILS # BLD AUTO: 0.08 K/UL (ref 0–0.2)
BASOPHILS NFR BLD: 1.6 % (ref 0–1.9)
BILIRUB SERPL-MCNC: 0.6 MG/DL (ref 0.1–1)
BUN SERPL-MCNC: 20 MG/DL (ref 6–20)
CALCIUM SERPL-MCNC: 9.1 MG/DL (ref 8.7–10.5)
CHLORIDE SERPL-SCNC: 109 MMOL/L (ref 95–110)
CHOLEST SERPL-MCNC: 160 MG/DL (ref 120–199)
CHOLEST/HDLC SERPL: 3.7 {RATIO} (ref 2–5)
CO2 SERPL-SCNC: 24 MMOL/L (ref 23–29)
CREAT SERPL-MCNC: 1 MG/DL (ref 0.5–1.4)
DIFFERENTIAL METHOD: ABNORMAL
EOSINOPHIL # BLD AUTO: 0.4 K/UL (ref 0–0.5)
EOSINOPHIL NFR BLD: 7.5 % (ref 0–8)
ERYTHROCYTE [DISTWIDTH] IN BLOOD BY AUTOMATED COUNT: 14.9 % (ref 11.5–14.5)
EST. GFR  (AFRICAN AMERICAN): >60 ML/MIN/1.73 M^2
EST. GFR  (NON AFRICAN AMERICAN): >60 ML/MIN/1.73 M^2
ESTIMATED AVG GLUCOSE: 111 MG/DL (ref 68–131)
GLUCOSE SERPL-MCNC: 98 MG/DL (ref 70–110)
HBA1C MFR BLD HPLC: 5.5 % (ref 4–5.6)
HCT VFR BLD AUTO: 38.2 % (ref 40–54)
HDLC SERPL-MCNC: 43 MG/DL (ref 40–75)
HDLC SERPL: 26.9 % (ref 20–50)
HGB BLD-MCNC: 11.4 G/DL (ref 14–18)
IMM GRANULOCYTES # BLD AUTO: 0.02 K/UL (ref 0–0.04)
IMM GRANULOCYTES NFR BLD AUTO: 0.4 % (ref 0–0.5)
LDLC SERPL CALC-MCNC: 92.6 MG/DL (ref 63–159)
LYMPHOCYTES # BLD AUTO: 1.7 K/UL (ref 1–4.8)
LYMPHOCYTES NFR BLD: 32.4 % (ref 18–48)
MCH RBC QN AUTO: 29.2 PG (ref 27–31)
MCHC RBC AUTO-ENTMCNC: 29.8 G/DL (ref 32–36)
MCV RBC AUTO: 98 FL (ref 82–98)
MONOCYTES # BLD AUTO: 0.4 K/UL (ref 0.3–1)
MONOCYTES NFR BLD: 8.3 % (ref 4–15)
NEUTROPHILS # BLD AUTO: 2.5 K/UL (ref 1.8–7.7)
NEUTROPHILS NFR BLD: 49.8 % (ref 38–73)
NONHDLC SERPL-MCNC: 117 MG/DL
NRBC BLD-RTO: 0 /100 WBC
PLATELET # BLD AUTO: 245 K/UL (ref 150–350)
PMV BLD AUTO: 10.5 FL (ref 9.2–12.9)
POTASSIUM SERPL-SCNC: 3.9 MMOL/L (ref 3.5–5.1)
PROT SERPL-MCNC: 7.1 G/DL (ref 6–8.4)
RBC # BLD AUTO: 3.91 M/UL (ref 4.6–6.2)
SODIUM SERPL-SCNC: 141 MMOL/L (ref 136–145)
TRIGL SERPL-MCNC: 122 MG/DL (ref 30–150)
WBC # BLD AUTO: 5.09 K/UL (ref 3.9–12.7)

## 2020-04-28 PROCEDURE — 83036 HEMOGLOBIN GLYCOSYLATED A1C: CPT

## 2020-04-28 PROCEDURE — 80053 COMPREHEN METABOLIC PANEL: CPT

## 2020-04-28 PROCEDURE — 85025 COMPLETE CBC W/AUTO DIFF WBC: CPT

## 2020-04-28 PROCEDURE — 36415 COLL VENOUS BLD VENIPUNCTURE: CPT

## 2020-04-28 PROCEDURE — 80061 LIPID PANEL: CPT

## 2020-04-30 ENCOUNTER — OFFICE VISIT (OUTPATIENT)
Dept: INTERNAL MEDICINE | Facility: CLINIC | Age: 45
End: 2020-04-30
Payer: COMMERCIAL

## 2020-04-30 VITALS
HEART RATE: 54 BPM | BODY MASS INDEX: 40.09 KG/M2 | HEIGHT: 68 IN | DIASTOLIC BLOOD PRESSURE: 80 MMHG | SYSTOLIC BLOOD PRESSURE: 138 MMHG | OXYGEN SATURATION: 98 % | WEIGHT: 264.56 LBS

## 2020-04-30 DIAGNOSIS — I10 ESSENTIAL HYPERTENSION: ICD-10-CM

## 2020-04-30 DIAGNOSIS — E11.65 TYPE 2 DIABETES MELLITUS WITH HYPERGLYCEMIA, WITHOUT LONG-TERM CURRENT USE OF INSULIN: ICD-10-CM

## 2020-04-30 DIAGNOSIS — G47.30 SLEEP APNEA, UNSPECIFIED TYPE: ICD-10-CM

## 2020-04-30 DIAGNOSIS — E78.5 HYPERLIPIDEMIA, UNSPECIFIED HYPERLIPIDEMIA TYPE: Primary | ICD-10-CM

## 2020-04-30 DIAGNOSIS — E66.01 CLASS 3 SEVERE OBESITY WITH SERIOUS COMORBIDITY AND BODY MASS INDEX (BMI) OF 40.0 TO 44.9 IN ADULT, UNSPECIFIED OBESITY TYPE: ICD-10-CM

## 2020-04-30 PROCEDURE — 3008F PR BODY MASS INDEX (BMI) DOCUMENTED: ICD-10-PCS | Mod: CPTII,S$GLB,, | Performed by: INTERNAL MEDICINE

## 2020-04-30 PROCEDURE — 3075F SYST BP GE 130 - 139MM HG: CPT | Mod: CPTII,S$GLB,, | Performed by: INTERNAL MEDICINE

## 2020-04-30 PROCEDURE — 3079F DIAST BP 80-89 MM HG: CPT | Mod: CPTII,S$GLB,, | Performed by: INTERNAL MEDICINE

## 2020-04-30 PROCEDURE — 99214 PR OFFICE/OUTPT VISIT, EST, LEVL IV, 30-39 MIN: ICD-10-PCS | Mod: S$GLB,,, | Performed by: INTERNAL MEDICINE

## 2020-04-30 PROCEDURE — 99999 PR PBB SHADOW E&M-EST. PATIENT-LVL IV: CPT | Mod: PBBFAC,,, | Performed by: INTERNAL MEDICINE

## 2020-04-30 PROCEDURE — 3044F HG A1C LEVEL LT 7.0%: CPT | Mod: CPTII,S$GLB,, | Performed by: INTERNAL MEDICINE

## 2020-04-30 PROCEDURE — 3008F BODY MASS INDEX DOCD: CPT | Mod: CPTII,S$GLB,, | Performed by: INTERNAL MEDICINE

## 2020-04-30 PROCEDURE — 99999 PR PBB SHADOW E&M-EST. PATIENT-LVL IV: ICD-10-PCS | Mod: PBBFAC,,, | Performed by: INTERNAL MEDICINE

## 2020-04-30 PROCEDURE — 3075F PR MOST RECENT SYSTOLIC BLOOD PRESS GE 130-139MM HG: ICD-10-PCS | Mod: CPTII,S$GLB,, | Performed by: INTERNAL MEDICINE

## 2020-04-30 PROCEDURE — 99214 OFFICE O/P EST MOD 30 MIN: CPT | Mod: S$GLB,,, | Performed by: INTERNAL MEDICINE

## 2020-04-30 PROCEDURE — 3079F PR MOST RECENT DIASTOLIC BLOOD PRESSURE 80-89 MM HG: ICD-10-PCS | Mod: CPTII,S$GLB,, | Performed by: INTERNAL MEDICINE

## 2020-04-30 PROCEDURE — 3044F PR MOST RECENT HEMOGLOBIN A1C LEVEL <7.0%: ICD-10-PCS | Mod: CPTII,S$GLB,, | Performed by: INTERNAL MEDICINE

## 2020-04-30 RX ORDER — HYDROCHLOROTHIAZIDE 12.5 MG/1
12.5 CAPSULE ORAL DAILY
Qty: 90 CAPSULE | Refills: 2 | Status: SHIPPED | OUTPATIENT
Start: 2020-04-30 | End: 2020-05-19

## 2020-04-30 NOTE — PROGRESS NOTES
HISTORY OF PRESENT ILLNESS:  He is a 44-year-old gentleman coming in to followup   ongoing medical problems.      He was diagnosed with COVID March 24th and he was being followed on the phone.   April 1st he became short of breath and was directed to go to the hospital after being found hypoxic.  Pulse ox is 84% and he was brought in and given oxygen and hydration.  He had acute on chronic kidney disease a creatinine 1.6 he is found to be anemic with a H&H 10.6 and 34.7 on discharge his AST and ALT were elevated on discharge at 67/101 he also had low albumin 2.6 on discharge he was also severely hyperglycemic with a hemoglobin A1c of 9.4 and glucose of 405 on admission.  He was started on insulin and tolerated well.  He was discharged on April 6th 2 home without oxygen any says he has been doing well.              He has lost about 20 #.    He has been exercising at home and says he feels pretty well.  He does get fatigued easily, but better.  .- He still has a mild post nasal drip.     .  We discussed his diabetes he says he is eating better and he says he is weight 245 lb at the current time.  He wants to know about any push himself exercising.  He has not had any hypoglycemia since says his sugars have been running pretty well.  He says he is eating well.  His bowels are moving normally.  He denies any visual or hearing difficulties.  Says he is in a good mood.       1.  He has hyperlipidemia.  He is taking pravastatin.  He has had hyperlipidemia   for several years.  Recent labs were reviewed.      2.  He has hypertension, which he has also had for multiple years.  He is on   Valsartan  and amlodipine.  Blood pressure   today is 138/80.Bp is on the higher side at home--   He is not having headaches or chest pain.  No shortness of   breath.      3. He also suffers from obesity.  He is weighing 264  today.  BMI is 40.    He has also had some hyperglycemia just in the diabetic range.      4. Last hemoglobin A1c was  "last year at 5.5.   He is on metformin and levimir 20 units a day.  He was started on insulin recently in the hospital.  Glucose goes up when he eats corn.        5.  Sleep apnea.  He is on CPAP, which he received late last year.  He is using   CPAP without any difficulty.     REVIEW OF SYSTEMS:  No chest pain, shortness of breath, palpitations, nausea,   vomiting, blurriness of vision.  No PND or orthopnea.    Answers for HPI/ROS submitted by the patient on 4/28/2020   activity change: No  unexpected weight change: No  neck pain: No  hearing loss: No  rhinorrhea: No  trouble swallowing: No  eye discharge: No  visual disturbance: Yes  chest tightness: No  wheezing: No  chest pain: No  palpitations: No  blood in stool: No  constipation: No  vomiting: No  diarrhea: No  polydipsia: No  polyuria: No  difficulty urinating: No  urgency: No  hematuria: No  joint swelling: No  arthralgias: No  headaches: No  weakness: No  confusion: No  dysphoric mood: No     PHYSICAL EXAMINATION: /80   Pulse (!) 54   Ht 5' 8" (1.727 m)   Wt 120 kg (264 lb 8.8 oz)   SpO2 98%   BMI 40.22 kg/m²     GENERAL:  He is a well-appearing 44-year-old obese -American gentleman in   no acute distress.  NECK:  Supple.  He has no JVD.  Thyroid is not enlarged.  CARDIOVASCULAR:  S1 and S2, regular rate and rhythm without murmur, gallop or   Rub.  CHest : CLear bilaterally    ABDOMEN:  Soft, obese, nontender, no hepatosplenomegaly.  EXTREMITIES:  Foot exam is documented by the medical student and reviewed by me.     ASSESSMENT:  1.  s/p COVID-- Transaminase have resolved.      2. Hyperlipidemia.    Continue pravastatin.  I will   see him back in six months.  3.  Hypertension.  Blood pressure is not well controlled.  Continue current   Medications.- WIll start hctz 12.5 mg a day   4.  Obesity.  We discussed this with him.  We are going to work on a low-fat,   low-cholesterol diet and exercise.  5.  Sleep apnea, using a CPAP machine without " any difficulty.  I asked him to   give me a call if he needs CPAP supplies and we can arrange that for him.  6.  Diabetes mellitus type 2, which has been diet controlled.

## 2020-05-04 ENCOUNTER — PATIENT OUTREACH (OUTPATIENT)
Dept: ADMINISTRATIVE | Facility: OTHER | Age: 45
End: 2020-05-04

## 2020-05-05 ENCOUNTER — CLINICAL SUPPORT (OUTPATIENT)
Dept: DIABETES | Facility: CLINIC | Age: 45
End: 2020-05-05
Payer: COMMERCIAL

## 2020-05-05 DIAGNOSIS — E11.65 TYPE 2 DIABETES MELLITUS WITH HYPERGLYCEMIA, WITHOUT LONG-TERM CURRENT USE OF INSULIN: ICD-10-CM

## 2020-05-05 PROCEDURE — G0108 PR DIAB MANAGE TRN  PER INDIV: ICD-10-PCS | Mod: 95,,, | Performed by: DIETITIAN, REGISTERED

## 2020-05-05 PROCEDURE — G0108 DIAB MANAGE TRN  PER INDIV: HCPCS | Mod: 95,,, | Performed by: DIETITIAN, REGISTERED

## 2020-05-05 NOTE — PROGRESS NOTES
Diabetes Care Specialist Virtual Visit Note   It was in the patient's best interest to receive diabetes self-management education and support services in this format to prevent the exposure to COVID-19.        The patient location is: home   The chief complaint leading to consultation is: Diabetes  Visit type: audiovisual  Total time spent with patient: 25 min   Each patient to whom he or she provides medical services by telemedicine is:  (1) informed of the relationship between the physician and patient and the respective role of any other health care provider with respect to management of the patient; and (2) notified that he or she may decline to receive medical services by telemedicine and may withdraw from such care at any time.        Diabetes Education  Author: Chitra España RD, CDE  Date: 5/5/2020    Diabetes Care Management Summary  Diabetes Education Record Progress: Comprehensive  (2 week f/u)    Current Diabetes Risk Level: Low  (A1c down from 9.4% to 5.5%)     Diabetes Type : Type II  Diabetes Diagnosis: 1-3 years      Current Treatment: Oral Medication, Insulin  Lantus 15 units daily;   metformin 500 mg BID            Health Maintenance was reviewed today with patient. Discussed with patient importance of routine eye exams, foot exams/foot care, blood work (i.e.: A1c, microalbumin, and lipid), dental visits, yearly flu vaccine, and pneumonia vaccine as indicated by PCP. Patient verbalized understanding.     Health Maintenance Topics with due status: Not Due       Topic Last Completion Date    Influenza Vaccine 10/10/2018    Lipid Panel 04/28/2020    Hemoglobin A1c 04/28/2020    Low Dose Statin 04/30/2020     Health Maintenance Due   Topic Date Due    Foot Exam  06/16/1985    Eye Exam  06/16/1985    TETANUS VACCINE  06/16/1993    Pneumococcal Vaccine (Medium Risk) (1 of 1 - PPSV23) 06/16/1994       Nutrition  Meal Planning: (3-4 meals daily (4th meal is usually salad))  What type of beverages do  you drink?: diet soda/tea    Monitoring   Self Monitoring : (SMBG FBG 120s; pre-prandial: )  Blood Glucose Logs: Yes  (oral recall)  Do you use a personal continuous glucose monitor?: No  In the last month, how often have you had a low blood sugar reaction?: never    Exercise   Exercise Type: walking, running (recently started walking/jogging >1 mile at a time)    Social History  Preferred Learning Method: Face to Face, Demonstration, Hands On, Reading Materials  (done virtually 2/2 COVID19)  Primary Support: Self, Spouse  Smoking Status: Never a Smoker  Barriers to Change: None  Learning Challenges : None  Readiness to Learn : Acceptance  Cultural Influences: No      Diabetes Education Assessment/Progress  Diabetes Disease Process (diabetes disease process and treatment options): Discussion, Instructed, Individual Session, Written Materials Provided, Comprehends Key Points       Reviewed disease progression.  Nutrition (Incorporating nutritional management into one's lifestyle): Discussion, Instructed, Individual Session, Written Materials Provided, Comprehends Key Pointss       Reviewed ADA dietary guidelines. Discussed continuing to limit carbs to <60 gm per meal.  Physical Activity (incorporating physical activity into one's lifestyle): Discussion, Instructed, Individual Session, Written Materials Provided, Comprehends Key Points       Has started working out again - encouraged routine PA.  Medications (states correct name, dose, onset, peak, duration, side effects & timing of meds): Discussion, Instructed, Individual Session, Written Materials Provided, Comprehends Key Points       Lantus decreased at last PCP visit. Discussed possibility of switching to GLP1 - will review with PCP.  Monitoring (monitoring blood glucose/other parameters & using results): Discussion, Instructed, Individual Session, Written Materials Provided, Comprehends Key Points       Reviewed BG goals - encouraged testing 1-2 times  daily.  Acute Complications (preventing, detecting, and treating acute complications): Discussion, Instructed, Individual Session, Written Materials Provided, Comprehends Key Points       Reviewed hypo vs hyper s/s and appropriate treatment.  Chronic Complications (preventing, detecting, and treating chronic complications): Discussion, Instructed, Individual Session, Written Materials Provided, Comprehends Key Points       Plans to see eye doc as soon as able.  Diabetes Distress and Support Systems: Discussion, Individual Session       No distress noted.  Behavioral (readiness for change, lifestyle practices, self-care behaviors): Discussion, Individual Session       Very motivated          Goals  Patient has selected/evaluated goals during today's session: Yes, evaluated    Healthy Eating: In Progress  (Limit carbs to <60 gm per meal)  Start Date: 04/21/20  Target Date: 07/31/20         Diabetes Care Plan/Intervention  Education Plan/Intervention: Individual Follow-Up DSMT  (f/u in 6 months)        Diabetes Meal Plan  Restrictions: Restricted Carbohydrate        Today's Self-Management Care Plan was developed with the patient's input and is based on barriers identified during today's assessment.    The long and short-term goals in the care plan were written with the patient/caregiver's input. The patient has agreed to work toward these goals to improve his overall diabetes control.      The patient received a copy of today's self-management plan and verbalized understanding of the care plan, goals, and all of today's instructions.      The patient was encouraged to communicate with his physician and care team regarding his condition(s) and treatment.  I provided the patient with my contact information today and encouraged him to contact me via phone or patient portal as needed.           Education Units of Time   Time Spent: 30 min

## 2020-05-05 NOTE — Clinical Note
Hetammi Alexander!  I think Mr. Tobias will do wonderfully without insulin in the future - he is open to using a GLP1 and knows to discuss this with you at f/u.Thanks!

## 2020-05-16 ENCOUNTER — LAB VISIT (OUTPATIENT)
Dept: LAB | Facility: HOSPITAL | Age: 45
End: 2020-05-16
Attending: INTERNAL MEDICINE
Payer: COMMERCIAL

## 2020-05-16 DIAGNOSIS — E11.65 TYPE 2 DIABETES MELLITUS WITH HYPERGLYCEMIA, WITHOUT LONG-TERM CURRENT USE OF INSULIN: ICD-10-CM

## 2020-05-16 LAB
ALBUMIN SERPL BCP-MCNC: 4.2 G/DL (ref 3.5–5.2)
ALP SERPL-CCNC: 48 U/L (ref 55–135)
ALT SERPL W/O P-5'-P-CCNC: 22 U/L (ref 10–44)
ANION GAP SERPL CALC-SCNC: 9 MMOL/L (ref 8–16)
AST SERPL-CCNC: 24 U/L (ref 10–40)
BILIRUB SERPL-MCNC: 0.5 MG/DL (ref 0.1–1)
BUN SERPL-MCNC: 13 MG/DL (ref 6–20)
CALCIUM SERPL-MCNC: 9.6 MG/DL (ref 8.7–10.5)
CHLORIDE SERPL-SCNC: 105 MMOL/L (ref 95–110)
CO2 SERPL-SCNC: 26 MMOL/L (ref 23–29)
CREAT SERPL-MCNC: 1.1 MG/DL (ref 0.5–1.4)
EST. GFR  (AFRICAN AMERICAN): >60 ML/MIN/1.73 M^2
EST. GFR  (NON AFRICAN AMERICAN): >60 ML/MIN/1.73 M^2
ESTIMATED AVG GLUCOSE: 94 MG/DL (ref 68–131)
GLUCOSE SERPL-MCNC: 104 MG/DL (ref 70–110)
HBA1C MFR BLD HPLC: 4.9 % (ref 4–5.6)
POTASSIUM SERPL-SCNC: 3.9 MMOL/L (ref 3.5–5.1)
PROT SERPL-MCNC: 7.6 G/DL (ref 6–8.4)
SODIUM SERPL-SCNC: 140 MMOL/L (ref 136–145)

## 2020-05-16 PROCEDURE — 80053 COMPREHEN METABOLIC PANEL: CPT

## 2020-05-16 PROCEDURE — 36415 COLL VENOUS BLD VENIPUNCTURE: CPT

## 2020-05-16 PROCEDURE — 83036 HEMOGLOBIN GLYCOSYLATED A1C: CPT

## 2020-05-19 ENCOUNTER — TELEPHONE (OUTPATIENT)
Dept: INTERNAL MEDICINE | Facility: CLINIC | Age: 45
End: 2020-05-19

## 2020-05-19 ENCOUNTER — OFFICE VISIT (OUTPATIENT)
Dept: INTERNAL MEDICINE | Facility: CLINIC | Age: 45
End: 2020-05-19
Payer: COMMERCIAL

## 2020-05-19 VITALS
WEIGHT: 261.94 LBS | HEART RATE: 53 BPM | OXYGEN SATURATION: 99 % | BODY MASS INDEX: 39.7 KG/M2 | DIASTOLIC BLOOD PRESSURE: 84 MMHG | HEIGHT: 68 IN | SYSTOLIC BLOOD PRESSURE: 148 MMHG

## 2020-05-19 DIAGNOSIS — I10 ESSENTIAL HYPERTENSION: ICD-10-CM

## 2020-05-19 DIAGNOSIS — E11.65 TYPE 2 DIABETES MELLITUS WITH HYPERGLYCEMIA, WITHOUT LONG-TERM CURRENT USE OF INSULIN: ICD-10-CM

## 2020-05-19 DIAGNOSIS — G47.30 SLEEP APNEA, UNSPECIFIED TYPE: ICD-10-CM

## 2020-05-19 DIAGNOSIS — Z11.4 ENCOUNTER FOR SCREENING FOR HIV: ICD-10-CM

## 2020-05-19 DIAGNOSIS — E78.5 HYPERLIPIDEMIA, UNSPECIFIED HYPERLIPIDEMIA TYPE: Primary | ICD-10-CM

## 2020-05-19 PROCEDURE — 99214 OFFICE O/P EST MOD 30 MIN: CPT | Mod: S$GLB,,, | Performed by: INTERNAL MEDICINE

## 2020-05-19 PROCEDURE — 99999 PR PBB SHADOW E&M-EST. PATIENT-LVL IV: ICD-10-PCS | Mod: PBBFAC,,, | Performed by: INTERNAL MEDICINE

## 2020-05-19 PROCEDURE — 3044F PR MOST RECENT HEMOGLOBIN A1C LEVEL <7.0%: ICD-10-PCS | Mod: CPTII,S$GLB,, | Performed by: INTERNAL MEDICINE

## 2020-05-19 PROCEDURE — 99214 PR OFFICE/OUTPT VISIT, EST, LEVL IV, 30-39 MIN: ICD-10-PCS | Mod: S$GLB,,, | Performed by: INTERNAL MEDICINE

## 2020-05-19 PROCEDURE — 3008F BODY MASS INDEX DOCD: CPT | Mod: CPTII,S$GLB,, | Performed by: INTERNAL MEDICINE

## 2020-05-19 PROCEDURE — 99999 PR PBB SHADOW E&M-EST. PATIENT-LVL IV: CPT | Mod: PBBFAC,,, | Performed by: INTERNAL MEDICINE

## 2020-05-19 PROCEDURE — 3079F DIAST BP 80-89 MM HG: CPT | Mod: CPTII,S$GLB,, | Performed by: INTERNAL MEDICINE

## 2020-05-19 PROCEDURE — 3008F PR BODY MASS INDEX (BMI) DOCUMENTED: ICD-10-PCS | Mod: CPTII,S$GLB,, | Performed by: INTERNAL MEDICINE

## 2020-05-19 PROCEDURE — 3044F HG A1C LEVEL LT 7.0%: CPT | Mod: CPTII,S$GLB,, | Performed by: INTERNAL MEDICINE

## 2020-05-19 PROCEDURE — 3079F PR MOST RECENT DIASTOLIC BLOOD PRESSURE 80-89 MM HG: ICD-10-PCS | Mod: CPTII,S$GLB,, | Performed by: INTERNAL MEDICINE

## 2020-05-19 PROCEDURE — 3077F PR MOST RECENT SYSTOLIC BLOOD PRESSURE >= 140 MM HG: ICD-10-PCS | Mod: CPTII,S$GLB,, | Performed by: INTERNAL MEDICINE

## 2020-05-19 PROCEDURE — 3077F SYST BP >= 140 MM HG: CPT | Mod: CPTII,S$GLB,, | Performed by: INTERNAL MEDICINE

## 2020-05-19 RX ORDER — INSULIN GLARGINE 100 [IU]/ML
10 INJECTION, SOLUTION SUBCUTANEOUS NIGHTLY
Qty: 1 BOX | Refills: 15 | Status: SHIPPED | OUTPATIENT
Start: 2020-05-19 | End: 2021-04-13 | Stop reason: SDUPTHER

## 2020-05-19 RX ORDER — VALSARTAN AND HYDROCHLOROTHIAZIDE 320; 25 MG/1; MG/1
1 TABLET, FILM COATED ORAL DAILY
Qty: 90 TABLET | Refills: 3 | Status: SHIPPED | OUTPATIENT
Start: 2020-05-19 | End: 2021-05-21

## 2020-05-19 NOTE — TELEPHONE ENCOUNTER
----- Message from Angela Lancaster sent at 5/19/2020  1:23 PM CDT -----  Contact: Self [phone]  Patient would like an call back form the nurse in regards to today's visit, please advise.

## 2020-05-19 NOTE — TELEPHONE ENCOUNTER
Spoke with pt and wife they're saying pt does not want to do the HIV test and that he made a mistake fill out his E-Check informed them that this is the guideline now and its has to get done pt says they want you to tell them that.      Please advise.

## 2020-05-19 NOTE — PROGRESS NOTES
HISTORY OF PRESENT ILLNESS:  He is a 44-year-old gentleman coming in to followup   ongoing medical problems.        He was diagnosed with COVID March 24th and he was being followed on the phone.   April 1st he became short of breath and was directed to go to the hospital after being found hypoxic.  Pulse ox is 84% and he was brought in and given oxygen and hydration.  He had acute on chronic kidney disease a creatinine 1.6 he is found to be anemic with a H&H 10.6 and 34.7 on discharge his AST and ALT were elevated on discharge at 67/101 he also had low albumin 2.6 on discharge he was also severely hyperglycemic with a hemoglobin A1c of 9.4 and glucose of 405 on admission.  He was started on insulin and tolerated well.  He was discharged on April 6th 2 home without oxygen any says he has been doing well.                   He has lost about 20# over all-- his weight stable since last visit.    He has been exercising at home and says he feels pretty well.  His fatigue is much better  .  We discussed his diabetes he says he is eating better and he says he is weight 261 lb at the current time.  He wants to know about any push himself exercising.  He has not had any hypoglycemia (lowest 97)  since says his sugars have been running pretty well.  He says he is ok.    His bowels are moving normally.  He denies any visual or hearing difficulties.  Says he is in a good mood.        1.  He has hyperlipidemia.  He is taking pravastatin.  He has had hyperlipidemia   for several years.  Recent labs were reviewed.       2.  He has hypertension, which he has also had for multiple years.  He is on   Valsartan  , HCTZ and amlodipine.  Blood pressure   today is   .Bp is on the higher side at home--   He is not having headaches or chest pain.  No shortness of   breath.       3. He also suffers from obesity.  He is weighing 261  today.  BMI is 40.    He has also had some hyperglycemia just in the diabetic range.       4. Last hemoglobin  "A1c was  4.9 .   He is on metformin and levimir 20 units a day.  He was started on insulin recently in the hospital.  Glucose goes up when he eats corn.         5.  Sleep apnea.  He is on CPAP, which he received late last year.  He is using   CPAP without any difficulty.     REVIEW OF SYSTEMS:  No chest pain, shortness of breath, palpitations, nausea,   vomiting, blurriness of vision.  No PND or orthopnea.    Answers for HPI/ROS submitted by the patient on 5/18/2020   Diabetes problem  Diabetes type: type 2  MedicAlert ID: No  blurred vision: Yes  chest pain: No  fatigue: No  foot paresthesias: Yes  foot ulcerations: No  polydipsia: No  polyphagia: No  polyuria: Yes  visual change: Yes  weakness: No  Symptom course: improving  confusion: No  dizziness: No  headaches: No  hunger: No  mood changes: No  nervous/anxious: No  pallor: No  seizures: No  sleepiness: No  speech difficulty: No  sweats: No  tremors: No  blackouts: No  hospitalization: No  nocturnal hypoglycemia: No  required assistance: No  required glucagon: No  CVA: No  impotence: No  nephropathy: No  PVD: No  CAD risks: dyslipidemia, hypertension, obesity, diabetes mellitus, male sex  Current treatments: diet, insulin injections, oral agent (dual therapy)  Treatment compliance: all of the time  Dose schedule: at bedtime  Given by: patient, significant other  Injection sites: abdominal wall  Home blood tests: 1-2 x per day  Home urines: <1 x per month  Monitoring compliance: good  Blood glucose trend: fluctuating minimally  Weight trend: fluctuating minimally  Current diet: generally healthy  Meal planning: avoidance of concentrated sweets, carbohydrate counting  Exercise: daily  Dietitian visit: Yes  Eye exam current: No  Sees podiatrist: No        PHYSICAL EXAMINATION:      BP (!) 148/84   Pulse (!) 53   Ht 5' 8" (1.727 m)   Wt 118.8 kg (261 lb 14.5 oz)   SpO2 99%   BMI 39.82 kg/m²      GENERAL:  He is a well-appearing 44-year-old obese " -American gentleman in   no acute distress.  NECK:  Supple.  He has no JVD.  Thyroid is not enlarged.  CARDIOVASCULAR:  S1 and S2, regular rate and rhythm without murmur, gallop or   Rub.  CHest : CLear bilaterally    ABDOMEN:  Soft, obese, nontender, no hepatosplenomegaly.  EXTREMITIES: Protective Sensation (w/ 10 gram monofilament):  Right: Intact  Left: Intact    Visual Inspection:  Normal -  Bilateral    Pedal Pulses:   Right: Present  Left: Present    Posterior tibialis:   Right:Present  Left: Present         ASSESSMENT:  1.  s/p COVID-- Transaminase have resolved.       2. Hyperlipidemia.    Continue pravastatin.  I will   see him back in six months.  3.  Hypertension.  Blood pressure is not well controlled.  Continue current   Medications.-    4.  Obesity.  We discussed this with him.  We are going to work on a low-fat,   low-cholesterol diet and exercise.  5.  Sleep apnea, using a CPAP machine without any difficulty.  I asked him to   give me a call if he needs CPAP supplies and we can arrange that for him.  6.  Diabetes mellitus type 2, which had been diet controlled.  -- Will decrease insulin to 10 units a day     Follow up in 4 months with labs

## 2020-05-26 RX ORDER — PRAVASTATIN SODIUM 20 MG/1
TABLET ORAL
Qty: 90 TABLET | Refills: 2 | Status: SHIPPED | OUTPATIENT
Start: 2020-05-26 | End: 2021-02-21

## 2020-05-26 RX ORDER — AMLODIPINE BESYLATE 10 MG/1
TABLET ORAL
Qty: 90 TABLET | Refills: 2 | Status: SHIPPED | OUTPATIENT
Start: 2020-05-26 | End: 2021-02-21

## 2020-07-08 DIAGNOSIS — E11.9 TYPE 2 DIABETES MELLITUS WITHOUT COMPLICATION, UNSPECIFIED WHETHER LONG TERM INSULIN USE: ICD-10-CM

## 2020-07-16 ENCOUNTER — PATIENT OUTREACH (OUTPATIENT)
Dept: ADMINISTRATIVE | Facility: HOSPITAL | Age: 45
End: 2020-07-16

## 2020-07-16 NOTE — PROGRESS NOTES
Health Maintenance Due   Topic Date Due    Eye Exam  06/16/1985    HIV Screening  06/16/1990    TETANUS VACCINE  06/16/1993    Pneumococcal Vaccine (Medium Risk) (1 of 1 - PPSV23) 06/16/1994     Portal message has been sent to patient regarding overdue eye exam.  Chart review completed.

## 2020-09-06 ENCOUNTER — HOSPITAL ENCOUNTER (EMERGENCY)
Facility: HOSPITAL | Age: 45
Discharge: HOME | End: 2020-09-07
Attending: EMERGENCY MEDICINE
Payer: COMMERCIAL

## 2020-09-06 DIAGNOSIS — N17.9 AKI (ACUTE KIDNEY INJURY): ICD-10-CM

## 2020-09-06 DIAGNOSIS — N20.0 NEPHROLITHIASIS: Primary | ICD-10-CM

## 2020-09-06 DIAGNOSIS — M25.562 LEFT KNEE PAIN: ICD-10-CM

## 2020-09-06 LAB
ALBUMIN SERPL BCP-MCNC: 4.3 G/DL (ref 3.5–5.2)
ALP SERPL-CCNC: 51 U/L (ref 55–135)
ALT SERPL W/O P-5'-P-CCNC: 19 U/L (ref 10–44)
ANION GAP SERPL CALC-SCNC: 11 MMOL/L (ref 8–16)
AST SERPL-CCNC: 18 U/L (ref 10–40)
BACTERIA #/AREA URNS AUTO: NORMAL /HPF
BASOPHILS # BLD AUTO: 0.07 K/UL (ref 0–0.2)
BASOPHILS NFR BLD: 0.6 % (ref 0–1.9)
BILIRUB SERPL-MCNC: 0.4 MG/DL (ref 0.1–1)
BILIRUB UR QL STRIP: NEGATIVE
BUN SERPL-MCNC: 25 MG/DL (ref 6–20)
CALCIUM SERPL-MCNC: 9.8 MG/DL (ref 8.7–10.5)
CHLORIDE SERPL-SCNC: 104 MMOL/L (ref 95–110)
CLARITY UR REFRACT.AUTO: CLEAR
CO2 SERPL-SCNC: 24 MMOL/L (ref 23–29)
COLOR UR AUTO: YELLOW
CREAT SERPL-MCNC: 1.9 MG/DL (ref 0.5–1.4)
DIFFERENTIAL METHOD: ABNORMAL
EOSINOPHIL # BLD AUTO: 0.2 K/UL (ref 0–0.5)
EOSINOPHIL NFR BLD: 1.3 % (ref 0–8)
ERYTHROCYTE [DISTWIDTH] IN BLOOD BY AUTOMATED COUNT: 12.6 % (ref 11.5–14.5)
EST. GFR  (AFRICAN AMERICAN): 48.1 ML/MIN/1.73 M^2
EST. GFR  (NON AFRICAN AMERICAN): 41.6 ML/MIN/1.73 M^2
GLUCOSE SERPL-MCNC: 152 MG/DL (ref 70–110)
GLUCOSE UR QL STRIP: NEGATIVE
HCT VFR BLD AUTO: 45.2 % (ref 40–54)
HGB BLD-MCNC: 14.1 G/DL (ref 14–18)
HGB UR QL STRIP: ABNORMAL
HYALINE CASTS UR QL AUTO: 1 /LPF
IMM GRANULOCYTES # BLD AUTO: 0.04 K/UL (ref 0–0.04)
IMM GRANULOCYTES NFR BLD AUTO: 0.3 % (ref 0–0.5)
KETONES UR QL STRIP: NEGATIVE
LEUKOCYTE ESTERASE UR QL STRIP: NEGATIVE
LYMPHOCYTES # BLD AUTO: 1.7 K/UL (ref 1–4.8)
LYMPHOCYTES NFR BLD: 13.9 % (ref 18–48)
MCH RBC QN AUTO: 27.8 PG (ref 27–31)
MCHC RBC AUTO-ENTMCNC: 31.2 G/DL (ref 32–36)
MCV RBC AUTO: 89 FL (ref 82–98)
MICROSCOPIC COMMENT: NORMAL
MONOCYTES # BLD AUTO: 0.8 K/UL (ref 0.3–1)
MONOCYTES NFR BLD: 6.6 % (ref 4–15)
NEUTROPHILS # BLD AUTO: 9.2 K/UL (ref 1.8–7.7)
NEUTROPHILS NFR BLD: 77.3 % (ref 38–73)
NITRITE UR QL STRIP: NEGATIVE
NRBC BLD-RTO: 0 /100 WBC
PH UR STRIP: 5 [PH] (ref 5–8)
PLATELET # BLD AUTO: 281 K/UL (ref 150–350)
PMV BLD AUTO: 10.4 FL (ref 9.2–12.9)
POTASSIUM SERPL-SCNC: 3.6 MMOL/L (ref 3.5–5.1)
PROT SERPL-MCNC: 7.9 G/DL (ref 6–8.4)
PROT UR QL STRIP: ABNORMAL
RBC # BLD AUTO: 5.07 M/UL (ref 4.6–6.2)
RBC #/AREA URNS AUTO: 2 /HPF (ref 0–4)
SODIUM SERPL-SCNC: 139 MMOL/L (ref 136–145)
SP GR UR STRIP: 1.02 (ref 1–1.03)
SQUAMOUS #/AREA URNS AUTO: 1 /HPF
URN SPEC COLLECT METH UR: ABNORMAL
WBC # BLD AUTO: 11.9 K/UL (ref 3.9–12.7)
WBC #/AREA URNS AUTO: 0 /HPF (ref 0–5)

## 2020-09-06 PROCEDURE — 85025 COMPLETE CBC W/AUTO DIFF WBC: CPT

## 2020-09-06 PROCEDURE — 96361 HYDRATE IV INFUSION ADD-ON: CPT

## 2020-09-06 PROCEDURE — 99285 EMERGENCY DEPT VISIT HI MDM: CPT | Mod: ,,, | Performed by: EMERGENCY MEDICINE

## 2020-09-06 PROCEDURE — 80053 COMPREHEN METABOLIC PANEL: CPT

## 2020-09-06 PROCEDURE — 99285 EMERGENCY DEPT VISIT HI MDM: CPT | Mod: 25

## 2020-09-06 PROCEDURE — 25000003 PHARM REV CODE 250: Performed by: EMERGENCY MEDICINE

## 2020-09-06 PROCEDURE — 83690 ASSAY OF LIPASE: CPT

## 2020-09-06 PROCEDURE — 63600175 PHARM REV CODE 636 W HCPCS: Performed by: EMERGENCY MEDICINE

## 2020-09-06 PROCEDURE — 51798 US URINE CAPACITY MEASURE: CPT

## 2020-09-06 PROCEDURE — 96374 THER/PROPH/DIAG INJ IV PUSH: CPT

## 2020-09-06 PROCEDURE — 81001 URINALYSIS AUTO W/SCOPE: CPT

## 2020-09-06 PROCEDURE — 99285 PR EMERGENCY DEPT VISIT,LEVEL V: ICD-10-PCS | Mod: ,,, | Performed by: EMERGENCY MEDICINE

## 2020-09-06 RX ORDER — MORPHINE SULFATE 4 MG/ML
4 INJECTION, SOLUTION INTRAMUSCULAR; INTRAVENOUS
Status: COMPLETED | OUTPATIENT
Start: 2020-09-06 | End: 2020-09-06

## 2020-09-06 RX ADMIN — MORPHINE SULFATE 4 MG: 4 INJECTION INTRAVENOUS at 10:09

## 2020-09-06 RX ADMIN — SODIUM CHLORIDE 1000 ML: 0.9 INJECTION, SOLUTION INTRAVENOUS at 10:09

## 2020-09-06 RX ADMIN — SODIUM CHLORIDE 1000 ML: 0.9 INJECTION, SOLUTION INTRAVENOUS at 11:09

## 2020-09-07 VITALS
HEIGHT: 68 IN | BODY MASS INDEX: 39.4 KG/M2 | RESPIRATION RATE: 18 BRPM | TEMPERATURE: 98 F | HEART RATE: 61 BPM | WEIGHT: 260 LBS | SYSTOLIC BLOOD PRESSURE: 148 MMHG | OXYGEN SATURATION: 98 % | DIASTOLIC BLOOD PRESSURE: 82 MMHG

## 2020-09-07 LAB — LIPASE SERPL-CCNC: 58 U/L (ref 4–60)

## 2020-09-07 NOTE — ED TRIAGE NOTES
"Deepak Tobias, a 45 y.o. male presents to the ED w/ complaint of urinary retention for an unknown time, states "when I do pee it is just a trickle". Reports having the sensation of having to urinate. Reports having associated R flank pain that radiates to the R back. States "I drink over 50 ounces of water daily". Endorses unable to have a BM, reports last BM was this morning at 7 am, describes as normal BM. States "im usually more regular than that". Pt states "also while I was getting dressed to come here I was putting on my pants when I heard my L knee pop. It feels very unstable now". Pt arrives with L knee brace in place. Pt able to bear weight, ambulatory into intake room. States "the pain is minimal".     Triage note:  Chief Complaint   Patient presents with    Urinary Retention     reports urinary retention and unable to have a BM since this morning. C/o right flank pain     Review of patient's allergies indicates:   Allergen Reactions    Keflex [cephalexin] Rash     Past Medical History:   Diagnosis Date    Hyperlipidemia     Hypertension     Obesity     Sleep apnea      "

## 2020-09-07 NOTE — ED PROVIDER NOTES
"Encounter Date: 9/6/2020    SCRIBE #1 NOTE: I, David Del Cid, am scribing for, and in the presence of,  Sean Hearn MD. I have scribed the following portions of the note - Other sections scribed: ROS, PE.       History     Chief Complaint   Patient presents with    Urinary Retention     reports urinary retention and unable to have a BM since this morning. C/o right flank pain     44 yo M with pmhx IDDM, HTN, HLD, s/p COVID infection late March presents with a chief complaint of abdominal pain.  Patient reports pain is present in the right flank.  Pain began in the right flank and radiates to the right lower quadrant and suprapubic region.  Pain began at 3:00 p.m. this afternoon.  It is constant.  It is still present.  It is described as "dull, ache, like I was kicked in the nuts but I was not kicked in the nuts."  No actual testicular pain.  It was slightly relieved with ibuprofen.  Currently mild in severity.  No associated nausea.  Patient reports his last bowel movement was this morning at 7:00 a.m..  Typically he goes 3 times a day so this feels like constipation to him.  He is passing gas.  He reports decreased urine output today.  He is uncertain whether urinated on out.  No history of abdominal surgeries.  No history of kidney stones.  Of note, patient has suffered from COVID late March early April at which time his hypoxic but he has recovered completely.  No history of abdominal surgeries.        Review of patient's allergies indicates:   Allergen Reactions    Keflex [cephalexin] Rash     Past Medical History:   Diagnosis Date    Hyperlipidemia     Hypertension     Obesity     Sleep apnea      Past Surgical History:   Procedure Laterality Date    achilies tendon       Family History   Problem Relation Age of Onset    Hypertension Father     Acne Neg Hx     Melanoma Neg Hx      Social History     Tobacco Use    Smoking status: Never Smoker    Smokeless tobacco: Never Used   Substance Use " Topics    Alcohol use: No     Frequency: Never     Drinks per session: Patient refused     Binge frequency: Never    Drug use: No     Review of Systems   Constitutional: Negative for fever.   HENT: Negative for sore throat.    Eyes: Negative for visual disturbance.   Respiratory: Negative for shortness of breath.    Cardiovascular: Negative for chest pain.   Gastrointestinal: Positive for constipation. Negative for abdominal distention, abdominal pain, diarrhea, nausea and vomiting.   Genitourinary: Positive for decreased urine volume and flank pain (right). Negative for dysuria and testicular pain.   Musculoskeletal: Negative for back pain.   Skin: Negative for wound.   Neurological: Negative for headaches.       Physical Exam     Initial Vitals [09/06/20 2120]   BP Pulse Resp Temp SpO2   (!) 160/95 60 20 98.2 °F (36.8 °C) 98 %      MAP       --         Physical Exam    Nursing note and vitals reviewed.  Constitutional: He appears well-developed and well-nourished. He is not diaphoretic. No distress.   HENT:   Head: Normocephalic and atraumatic.   Eyes: Conjunctivae and EOM are normal.   Neck: Normal range of motion. Neck supple. No JVD present.   Cardiovascular: Normal rate, regular rhythm, normal heart sounds and intact distal pulses. Exam reveals no gallop and no friction rub.    No murmur heard.  Pulmonary/Chest: Breath sounds normal. No respiratory distress. He has no wheezes. He has no rhonchi. He has no rales. He exhibits no tenderness.   Abdominal: Soft. Bowel sounds are normal. He exhibits no distension. There is abdominal tenderness. There is no rebound and no guarding.   Some RUQ tenderness to dvery eep palpation. No guarding or rebound. No CVA tenderness. Negative Robertson's sign.   Genitourinary:    Genitourinary Comments: No testicular masses, no inguinal hernia.     Musculoskeletal: Normal range of motion. No tenderness.   Lymphadenopathy:     He has no cervical adenopathy.   Neurological: He is  alert and oriented to person, place, and time. He has normal strength. No sensory deficit.   Skin: Skin is warm and dry. Capillary refill takes less than 2 seconds.   Psychiatric: He has a normal mood and affect.         ED Course   Procedures  Labs Reviewed   CBC W/ AUTO DIFFERENTIAL - Abnormal; Notable for the following components:       Result Value    Mean Corpuscular Hemoglobin Conc 31.2 (*)     Gran # (ANC) 9.2 (*)     Gran% 77.3 (*)     Lymph% 13.9 (*)     All other components within normal limits   COMPREHENSIVE METABOLIC PANEL - Abnormal; Notable for the following components:    Glucose 152 (*)     BUN, Bld 25 (*)     Creatinine 1.9 (*)     Alkaline Phosphatase 51 (*)     eGFR if  48.1 (*)     eGFR if non  41.6 (*)     All other components within normal limits   URINALYSIS, REFLEX TO URINE CULTURE - Abnormal; Notable for the following components:    Protein, UA 1+ (*)     Occult Blood UA 1+ (*)     All other components within normal limits    Narrative:     Specimen Source->Urine   URINALYSIS MICROSCOPIC    Narrative:     Specimen Source->Urine   LIPASE          Imaging Results          X-Ray Knee 3 View Left (Final result)  Result time 09/06/20 23:02:15    Final result by Juan Luis Dale MD (09/06/20 23:02:15)                 Impression:      No obvious evidence of an acute osseous injury.    Findings indicating possible residual of Osgood Ty syndrome when the patient was an adolescent.      Electronically signed by: Juan Luis Dale  Date:    09/06/2020  Time:    23:02             Narrative:    EXAMINATION:  XR KNEE 3 VIEW LEFT    CLINICAL HISTORY:  Pain in left knee    TECHNIQUE:  AP, lateral, and Merchant views of the left knee were performed.    COMPARISON:  None    FINDINGS:  Multiple views of the left knee indicates that there is segmentation of the tibial tubercle.  This is believed to be a normal variant and does not indicate an avulsion injury.  The  patellofemoral joint is unremarkable.  No indication of a joint effusion.  The frontal views indicate that the medial and lateral compartments are equal.  No apparent injury involving the distal femur, patella or the proximal fibula.                               CT Abdomen Pelvis  Without Contrast (Final result)  Result time 09/06/20 22:34:04    Final result by Juan Luis Dale MD (09/06/20 22:34:04)                 Impression:      Passage of a 2-3 mm kidney stone on the right-hand side with residual hydronephrosis and hydroureter also on the right.      Electronically signed by: Juan Luis Dale  Date:    09/06/2020  Time:    22:34             Narrative:    EXAMINATION:  CT ABDOMEN PELVIS WITHOUT CONTRAST    CLINICAL HISTORY:  Flank pain, kidney stone suspected;    TECHNIQUE:  Low dose axial images, sagittal and coronal reformations were obtained from the lung bases to the pubic symphysis.    COMPARISON:  None    FINDINGS:  The inferior aspect of the lung parenchyma is free of any specific focal abnormality.  There is a small amount of atelectasis appreciated on the right side.  The cardiac silhouette is within normal limits.    Imaging below the diaphragm reveals that there is mild hydronephrosis and hydroureter involving the right kidney and ureter.  There is no obvious calcification seen in the lumen of the ureter however near the right UVJ there is a stone seen within the lumen of the bladder which measures approximately 2-3 mm.  The left kidney is totally unremarkable.  No indication dilatation of the left ureter.    The liver, adrenal glands, pancreas gallbladder and spleen all appear to be within normal limits.    No indication of free air free fluid within the peritoneal cavity.  No indication of mesenteric or retroperitoneal lymphadenopathy.    The stomach small bowel and large bowel are unremarkable within the abdomen.  Within the pelvic region the appendix is clearly appreciated right pericolic gutter.   The prostate and rectum are unremarkable.                                 Medical Decision Making:   History:   Old Medical Records: I decided to obtain old medical records.  Initial Assessment:   44 yo M with pmhx IDDM, HTN, HLD, s/p COVID infection late March presents with a chief complaint of right flank/abdominal pain.   Differential Diagnosis:   Pyelonephritis, nephrolithiasis, cholecystitis, constipation, urinary retention  Clinical Tests:   Lab Tests: Ordered  Radiological Study: Ordered  ED Management:  Will administer IV fluids and analgesics.  Obtain serum and urine labs as well as CT abdomen/pelvis without contrast.    Reassessment:  CT reveals passage of 2-3 mm kidney stone in the right bladder with residual hydronephrosis and hydroureter.  CBC without leukocytosis or anemia.  CMP with acute kidney injury-BUN 25, creatinine 1.9.  I suspect secondary to obstructive uropathy.  Urinalysis with 1+ blood but negative for infection.  Overall, patient is well-appearing and pain-free.  His abdomen is soft and nontender.  Symptoms are overall consistent with pased nephrolithiasis.  Given acute kidney injury, I administered a 2nd L of IV fluids.  Patient was provided with a urine strainer and follow up with Urology. Return precautions.              Scribe Attestation:   Scribe #1: I performed the above scribed service and the documentation accurately describes the services I performed. I attest to the accuracy of the note.    Attending Attestation:           Physician Attestation for Scribe:      Comments: I, Dr. Sean Hearn, personally performed the services described in this documentation. All medical record entries made by the scribe were at my direction and in my presence.  I have reviewed the chart and agree that the record reflects my personal performance and is accurate and complete. Sean Hearn MD.  11:55 PM 09/06/2020                            Clinical Impression:       ICD-10-CM ICD-9-CM   1.  Nephrolithiasis  N20.0 592.0   2. Left knee pain  M25.562 719.46   3. NOEMY (acute kidney injury)  N17.9 584.9             ED Disposition Condition    Discharge Stable        ED Prescriptions     None        Follow-up Information     Follow up With Specialties Details Why Contact Info Additional Information    Edis Alexander Jr., MD Internal Medicine Schedule an appointment as soon as possible for a visit   [address]  [place] [address]  [phone]       [facility]  As needed [address] [facility] [id number] [phone] Main Building, 4th Floor Please park in [facility] and take [facility] elevator                                     Sean Hearn MD  09/06/20 [id number]

## 2020-09-07 NOTE — DISCHARGE INSTRUCTIONS
Be sure to follow-up with your primary care doctor soon as possible for a repeat test of your kidney function.  Return to the emergency department for any fever, inability to urinate, severe pain, or other concerning symptoms.

## 2020-09-08 ENCOUNTER — PATIENT OUTREACH (OUTPATIENT)
Dept: ADMINISTRATIVE | Facility: HOSPITAL | Age: 45
End: 2020-09-08

## 2020-09-08 NOTE — PROGRESS NOTES
Health Maintenance Due   Topic Date Due    Hepatitis C Screening  1975    Eye Exam  06/16/1985    HIV Screening  06/16/1990    TETANUS VACCINE  06/16/1993    Pneumococcal Vaccine (Medium Risk) (1 of 1 - PPSV23) 06/16/1994     Portal message has been sent to patient regarding overdue diabetic eye exam.  Chart review completed.

## 2020-09-12 ENCOUNTER — LAB VISIT (OUTPATIENT)
Dept: LAB | Facility: HOSPITAL | Age: 45
End: 2020-09-12
Attending: INTERNAL MEDICINE
Payer: COMMERCIAL

## 2020-09-12 DIAGNOSIS — Z11.4 ENCOUNTER FOR SCREENING FOR HIV: ICD-10-CM

## 2020-09-12 PROCEDURE — 36415 COLL VENOUS BLD VENIPUNCTURE: CPT

## 2020-09-12 PROCEDURE — 86703 HIV-1/HIV-2 1 RESULT ANTBDY: CPT

## 2020-09-14 LAB — HIV 1+2 AB+HIV1 P24 AG SERPL QL IA: NEGATIVE

## 2020-09-15 ENCOUNTER — TELEPHONE (OUTPATIENT)
Dept: INTERNAL MEDICINE | Facility: CLINIC | Age: 45
End: 2020-09-15

## 2020-09-15 ENCOUNTER — PATIENT OUTREACH (OUTPATIENT)
Dept: ADMINISTRATIVE | Facility: OTHER | Age: 45
End: 2020-09-15

## 2020-09-15 DIAGNOSIS — E78.5 HYPERLIPIDEMIA, UNSPECIFIED HYPERLIPIDEMIA TYPE: ICD-10-CM

## 2020-09-15 DIAGNOSIS — E11.65 TYPE 2 DIABETES MELLITUS WITH HYPERGLYCEMIA, WITHOUT LONG-TERM CURRENT USE OF INSULIN: ICD-10-CM

## 2020-09-15 DIAGNOSIS — I10 ESSENTIAL HYPERTENSION: Primary | ICD-10-CM

## 2020-09-15 NOTE — PROGRESS NOTES
Care Everywhere:   Immunization: updated  Health Maintenance: updated  Media Review:   Legacy Review:   Order placed:   Upcoming appts: optometry 9/16

## 2020-09-15 NOTE — TELEPHONE ENCOUNTER
----- Message from Cata James sent at 9/15/2020 10:43 AM CDT -----  Contact: Patient [phone]  Patient had labs on 09/12/2020 and states that they should have included a A1C and other labs not just a HIV    Please call and advise.    Thank You

## 2020-09-16 ENCOUNTER — LAB VISIT (OUTPATIENT)
Dept: LAB | Facility: HOSPITAL | Age: 45
End: 2020-09-16
Attending: INTERNAL MEDICINE
Payer: COMMERCIAL

## 2020-09-16 ENCOUNTER — OFFICE VISIT (OUTPATIENT)
Dept: OPTOMETRY | Facility: CLINIC | Age: 45
End: 2020-09-16
Payer: COMMERCIAL

## 2020-09-16 DIAGNOSIS — H04.123 DRY EYE SYNDROME OF BOTH EYES: ICD-10-CM

## 2020-09-16 DIAGNOSIS — E11.65 TYPE 2 DIABETES MELLITUS WITH HYPERGLYCEMIA, WITHOUT LONG-TERM CURRENT USE OF INSULIN: ICD-10-CM

## 2020-09-16 DIAGNOSIS — E11.9 TYPE 2 DIABETES MELLITUS WITHOUT RETINOPATHY: Primary | ICD-10-CM

## 2020-09-16 DIAGNOSIS — H43.393 VISUAL FLOATERS, BILATERAL: ICD-10-CM

## 2020-09-16 DIAGNOSIS — E78.5 HYPERLIPIDEMIA, UNSPECIFIED HYPERLIPIDEMIA TYPE: ICD-10-CM

## 2020-09-16 LAB
ALBUMIN SERPL BCP-MCNC: 4.1 G/DL (ref 3.5–5.2)
ALP SERPL-CCNC: 49 U/L (ref 55–135)
ALT SERPL W/O P-5'-P-CCNC: 20 U/L (ref 10–44)
ANION GAP SERPL CALC-SCNC: 10 MMOL/L (ref 8–16)
AST SERPL-CCNC: 18 U/L (ref 10–40)
BILIRUB SERPL-MCNC: 0.4 MG/DL (ref 0.1–1)
BUN SERPL-MCNC: 18 MG/DL (ref 6–20)
CALCIUM SERPL-MCNC: 9.7 MG/DL (ref 8.7–10.5)
CHLORIDE SERPL-SCNC: 103 MMOL/L (ref 95–110)
CHOLEST SERPL-MCNC: 154 MG/DL (ref 120–199)
CHOLEST/HDLC SERPL: 3.8 {RATIO} (ref 2–5)
CO2 SERPL-SCNC: 28 MMOL/L (ref 23–29)
CREAT SERPL-MCNC: 1.1 MG/DL (ref 0.5–1.4)
EST. GFR  (AFRICAN AMERICAN): >60 ML/MIN/1.73 M^2
EST. GFR  (NON AFRICAN AMERICAN): >60 ML/MIN/1.73 M^2
ESTIMATED AVG GLUCOSE: 111 MG/DL (ref 68–131)
GLUCOSE SERPL-MCNC: 118 MG/DL (ref 70–110)
HBA1C MFR BLD HPLC: 5.5 % (ref 4–5.6)
HCV AB SERPL QL IA: NEGATIVE
HDLC SERPL-MCNC: 41 MG/DL (ref 40–75)
HDLC SERPL: 26.6 % (ref 20–50)
LDLC SERPL CALC-MCNC: 91.6 MG/DL (ref 63–159)
NONHDLC SERPL-MCNC: 113 MG/DL
POTASSIUM SERPL-SCNC: 3.5 MMOL/L (ref 3.5–5.1)
PROT SERPL-MCNC: 7.8 G/DL (ref 6–8.4)
SODIUM SERPL-SCNC: 141 MMOL/L (ref 136–145)
TRIGL SERPL-MCNC: 107 MG/DL (ref 30–150)

## 2020-09-16 PROCEDURE — 99999 PR PBB SHADOW E&M-EST. PATIENT-LVL III: ICD-10-PCS | Mod: PBBFAC,,, | Performed by: OPTOMETRIST

## 2020-09-16 PROCEDURE — 92004 PR EYE EXAM, NEW PATIENT,COMPREHESV: ICD-10-PCS | Mod: S$GLB,,, | Performed by: OPTOMETRIST

## 2020-09-16 PROCEDURE — 92004 COMPRE OPH EXAM NEW PT 1/>: CPT | Mod: S$GLB,,, | Performed by: OPTOMETRIST

## 2020-09-16 PROCEDURE — 36415 COLL VENOUS BLD VENIPUNCTURE: CPT | Mod: PO

## 2020-09-16 PROCEDURE — 99999 PR PBB SHADOW E&M-EST. PATIENT-LVL III: CPT | Mod: PBBFAC,,, | Performed by: OPTOMETRIST

## 2020-09-16 PROCEDURE — 83036 HEMOGLOBIN GLYCOSYLATED A1C: CPT

## 2020-09-16 PROCEDURE — 80053 COMPREHEN METABOLIC PANEL: CPT

## 2020-09-16 PROCEDURE — 86803 HEPATITIS C AB TEST: CPT

## 2020-09-16 PROCEDURE — 80061 LIPID PANEL: CPT

## 2020-09-16 NOTE — LETTER
September 16, 2020      Edis Alexander Jr., MD  [address]  [facility] [address]           [place] [facility]  [address] [facility].  [place] [address]  Phone: [phone]  Fax: [phone]          Patient: Deepak Tobias   MR Number: [id number]   YOB: 1975   Date of Visit: 9/16/2020       Dear Dr. Edis Alexander Jr.:    Thank you for referring Deepak Tobias to me for evaluation. Attached you will find relevant portions of my assessment and plan of care.    If you have questions, please do not hesitate to call me. I look forward to following Deepak Tobias along with you.    Sincerely,    Wilda Javed, OD    Enclosure  CC:  No Recipients    If you would like to receive this communication electronically, please contact [email or web address] or [phone] to request more information on [employer] Link access.    For providers and/or their staff who would like to refer a patient to [facility], please contact us through our one-stop-shop provider referral line, [facility], at [phone].    If you feel you have received this communication in error or would no longer like to receive these types of communications, please e-mail [email or web address]

## 2020-09-16 NOTE — PROGRESS NOTES
NATALY HINOJOSA Diabetic  this morning.  Patient was diagnosed with diabetes   earlier this year and states BS fluctuates between 120-215. Patient had   eye exam elsewhere about 1 month ago but was not dilated.  Wanted to have   a dilated exam today due to diabetes.  Glasses about 1 month old and   patient has some trouble adjusting to bifocals and states they are sitting   too high. Not using any drops.   (+) floaters once a while (-) flashes (+) light sensitivity  Hemoglobin A1C       Date                     Value               Ref Range             Status                05/16/2020               4.9                 4.0 - 5.6 %           Final         04/28/2020               5.5                 4.0 - 5.6 %           Final                04/02/2020               9.4 (H)             4.0 - 5.6 %           Final                  Last edited by Wilda Javed, OD on 9/16/2020  9:53 AM. (History)            Assessment /Plan     For exam results, see Encounter Report.    Type 2 diabetes mellitus without retinopathy    Type 2 diabetes mellitus with hyperglycemia, without long-term current use of insulin  -     Ambulatory referral/consult to Optometry    Visual floaters, bilateral    Dry eye syndrome of both eyes      1-2. BS control. No signs of diabetic retinopathy. Monitor with annual exam.  3. No e/o h/b/t 360 degrees OU. Monitor for worsening of symptoms or S/Sx of RD.   4. Recommend Systane Ultra or Refresh Optive BID-TID OU to aid with symptoms of dry eyes.

## 2020-09-21 ENCOUNTER — OFFICE VISIT (OUTPATIENT)
Dept: INTERNAL MEDICINE | Facility: CLINIC | Age: 45
End: 2020-09-21
Payer: COMMERCIAL

## 2020-09-21 VITALS
DIASTOLIC BLOOD PRESSURE: 86 MMHG | WEIGHT: 272.5 LBS | OXYGEN SATURATION: 98 % | HEART RATE: 65 BPM | HEIGHT: 68 IN | BODY MASS INDEX: 41.3 KG/M2 | SYSTOLIC BLOOD PRESSURE: 134 MMHG

## 2020-09-21 DIAGNOSIS — M79.642 PAIN IN BOTH HANDS: ICD-10-CM

## 2020-09-21 DIAGNOSIS — M79.641 PAIN IN BOTH HANDS: ICD-10-CM

## 2020-09-21 DIAGNOSIS — E11.65 TYPE 2 DIABETES MELLITUS WITH HYPERGLYCEMIA, WITHOUT LONG-TERM CURRENT USE OF INSULIN: Primary | ICD-10-CM

## 2020-09-21 DIAGNOSIS — N20.0 RENAL STONES: ICD-10-CM

## 2020-09-21 DIAGNOSIS — M25.562 LEFT KNEE PAIN, UNSPECIFIED CHRONICITY: ICD-10-CM

## 2020-09-21 PROCEDURE — 99999 PR PBB SHADOW E&M-EST. PATIENT-LVL V: ICD-10-PCS | Mod: PBBFAC,,, | Performed by: INTERNAL MEDICINE

## 2020-09-21 PROCEDURE — 3075F SYST BP GE 130 - 139MM HG: CPT | Mod: CPTII,S$GLB,, | Performed by: INTERNAL MEDICINE

## 2020-09-21 PROCEDURE — 3079F DIAST BP 80-89 MM HG: CPT | Mod: CPTII,S$GLB,, | Performed by: INTERNAL MEDICINE

## 2020-09-21 PROCEDURE — 3079F PR MOST RECENT DIASTOLIC BLOOD PRESSURE 80-89 MM HG: ICD-10-PCS | Mod: CPTII,S$GLB,, | Performed by: INTERNAL MEDICINE

## 2020-09-21 PROCEDURE — 99214 PR OFFICE/OUTPT VISIT, EST, LEVL IV, 30-39 MIN: ICD-10-PCS | Mod: S$GLB,,, | Performed by: INTERNAL MEDICINE

## 2020-09-21 PROCEDURE — 3075F PR MOST RECENT SYSTOLIC BLOOD PRESS GE 130-139MM HG: ICD-10-PCS | Mod: CPTII,S$GLB,, | Performed by: INTERNAL MEDICINE

## 2020-09-21 PROCEDURE — 3008F PR BODY MASS INDEX (BMI) DOCUMENTED: ICD-10-PCS | Mod: CPTII,S$GLB,, | Performed by: INTERNAL MEDICINE

## 2020-09-21 PROCEDURE — 3044F PR MOST RECENT HEMOGLOBIN A1C LEVEL <7.0%: ICD-10-PCS | Mod: CPTII,S$GLB,, | Performed by: INTERNAL MEDICINE

## 2020-09-21 PROCEDURE — 99214 OFFICE O/P EST MOD 30 MIN: CPT | Mod: S$GLB,,, | Performed by: INTERNAL MEDICINE

## 2020-09-21 PROCEDURE — 3008F BODY MASS INDEX DOCD: CPT | Mod: CPTII,S$GLB,, | Performed by: INTERNAL MEDICINE

## 2020-09-21 PROCEDURE — 3044F HG A1C LEVEL LT 7.0%: CPT | Mod: CPTII,S$GLB,, | Performed by: INTERNAL MEDICINE

## 2020-09-21 PROCEDURE — 99999 PR PBB SHADOW E&M-EST. PATIENT-LVL V: CPT | Mod: PBBFAC,,, | Performed by: INTERNAL MEDICINE

## 2020-09-21 NOTE — PROGRESS NOTES
"  I have personally taken the history and examined this patient and agree with the resident's note as stated above with the following thoughts:  /86 (BP Location: Left arm, Patient Position: Sitting, BP Method: Large (Manual))   Pulse 65   Ht 5' 8" (1.727 m)   Wt 123.6 kg (272 lb 7.8 oz)   SpO2 98%   BMI 41.43 kg/m²     Discussed getting vaccines up to date.  Discussed importance of low fat diet and exercise.  He has gained about 12 #  Since covid.  Monitor that weight gain.  Will sen him to ortho fo his left knee pain.  Will follow in 6 months with labs.  Will check hgb A1c and cmp at that time.     Answers for HPI/ROS submitted by the patient on 9/20/2020   Diabetes problem  Diabetes type: type 2  MedicAlert ID: No  blurred vision: Yes  chest pain: No  fatigue: Yes  foot paresthesias: Yes  foot ulcerations: No  polydipsia: Yes  polyphagia: Yes  polyuria: Yes  visual change: Yes  weakness: Yes  weight loss: Yes  Symptom course: improving  confusion: No  dizziness: No  headaches: Yes  hunger: Yes  mood changes: Yes  nervous/anxious: Yes  pallor: No  seizures: No  sleepiness: No  speech difficulty: No  sweats: No  tremors: No  blackouts: No  hospitalization: No  required assistance: No  required glucagon: No  CVA: No  heart disease: No  impotence: No  retinopathy: Yes  autonomic neuropathy: Yes  CAD risks: dyslipidemia, hypertension, obesity, stress, diabetes mellitus, male sex  Current treatments: insulin injections, oral agent (dual therapy)  Treatment compliance: most of the time  Dose schedule: at bedtime  Given by: patient  Injection sites: abdominal wall  Home blood tests: 1-2 x per day  Home urines: <1 x per month  Monitoring compliance: fair  Blood glucose trend: fluctuating minimally  Weight trend: increasing steadily  Current diet: generally healthy  Meal planning: avoidance of concentrated sweets  Exercise: intermittently  Dietitian visit: Yes  Eye exam current: Yes  Sees podiatrist: No    "

## 2020-09-21 NOTE — PROGRESS NOTES
Subjective     Chief Complaint: Wellness exam    History of Present Illness:  Mr. Deepak Tobias is a 45 y.o. male here for his annual exam.     He recently went to the ED for nephrolithiasis. He was able to pass the stone, and has it saved for further analysis. No recurrent symptoms.    He reports left knee pain. It began a few months ago, but he states when he was dressing to go to the hospital 2 weeks ago, he heard a loud snap with severe pain. Pain has improved some, but pt states that he experiences episodes of sharp pain with certain movements of his foot.     Patient states worsening stiffness and pain in hands, elbows and shoulders. It has been occurring for a few years.    Hyperlipidemia:  He is taking pravastatin.  He has had hyperlipidemia for several years.  Recent labs showed good control.       Hypertension: Present for multiple years.  He is on Valsartan/HCTZ 320-25mg and amlodipine 10mg.  Blood pressure   today is 134/86, well controlled.       Obesity:  He is weighing 272 today.  BMI is 41.4. He is regaining weight he lost when he had COVID due to inactivity secondary to knee pain.       DM: Last hemoglobin A1c was 5.5 .  He is on metformin and levimir 10 units a day.      Sleep apnea: He uses CPAP without any difficulty.    ROS    Answers for HPI/ROS submitted by the patient on 9/20/2020   Diabetes problem  Diabetes type: type 2  MedicAlert ID: No  fatigue: Yes  foot paresthesias: Yes  foot ulcerations: No  polyphagia: Yes  polyuria: Yes  visual change: Yes  Symptom course: improving  confusion: No  hunger: Yes  mood changes: Yes  pallor: No  sleepiness: No  speech difficulty: No  sweats: No  blackouts: No  hospitalization: No  required assistance: No  required glucagon: No  CVA: No  heart disease: No  impotence: No  retinopathy: Yes  autonomic neuropathy: Yes  CAD risks: dyslipidemia, hypertension, obesity, stress, diabetes mellitus, male sex  Current treatments: insulin injections, oral agent  "(dual therapy)  Treatment compliance: most of the time  Dose schedule: at bedtime  Given by: patient  Injection sites: abdominal wall  Home blood tests: 1-2 x per day  Home urines: <1 x per month  Monitoring compliance: fair  Blood glucose trend: fluctuating minimally  Weight trend: increasing steadily  Current diet: generally healthy  Meal planning: avoidance of concentrated sweets  Exercise: intermittently  Dietitian visit: Yes  Eye exam current: Yes  Sees podiatrist: No    OBJECTIVE:     Vital Signs:  Vitals:    09/21/20 1012   BP: 134/86   BP Location: Left arm   Patient Position: Sitting   BP Method: Large (Manual)   Pulse: 65   SpO2: 98%   Weight: 123.6 kg (272 lb 7.8 oz)   Height: 5' 8" (1.727 m)       Body mass index is 41.43 kg/m².     Physical Exam   Constitutional: He is oriented to person, place, and time and well-developed, well-nourished, and in no distress.   HENT:   Head: Normocephalic and atraumatic.   Eyes: EOM are normal.   Neck: Normal range of motion.   Cardiovascular: Normal rate, regular rhythm, normal heart sounds and intact distal pulses.   Pulmonary/Chest: Effort normal and breath sounds normal. He has no wheezes. He has no rales.   Abdominal: Soft. Bowel sounds are normal. There is no abdominal tenderness.   Musculoskeletal:         General: No edema.      Comments: Left knee pain with ROM.    Neurological: He is alert and oriented to person, place, and time.   Skin: Skin is warm and dry.   Psychiatric: Mood normal.         ASSESSMENT & PLAN:   Hypertension       -      Well controlled, continue current regimen    Hyperlipidemia       -      Continue pravastatin    Type 2 diabetes mellitus with hyperglycemia, without long-term current use of insulin  -     Continue lantus and metformin  -     Comprehensive metabolic panel; Future; Expected date: 09/21/2020  -     Hemoglobin A1C; Future; Expected date: 09/21/2020    Left knee pain, unspecified chronicity  -     Ambulatory referral/consult " to Orthopedics; Future; Expected date: 09/28/2020    Pain in both hands  -     Ambulatory referral/consult to Rheumatology; Future; Expected date: 09/28/2020    Renal stones  -     Ambulatory referral/consult to Urology; Future; Expected date: 09/28/2020    Pneumococcal vaccine today.    RTC in 6 months    Discussed with Dr. Alexander  - staff attestation to follow      Lacie Mcdonald DO  Internal Medicine PGY1  [facility]  [address] [place] [address]

## 2020-09-22 NOTE — PROGRESS NOTES
"Subjective:      Deepak Tobias is a 45 y.o. male who was referred by Dr. Edis Alexander Jr for evaluation of Kidney stone.      He presented to the emergency department on 09/06 with complaint of right flank pain, and subsequently passed his 1st renal stone.  He presents today with collected stone. He reports occasional mild dysuria since passing stone. He denies personal history of kidney issues, but reports year long history of frothy/foul smelling urine. He attributes this to an increase in soda intake. He also reports that he uses a significant amount of artifical sweetener daily (example: 7 packets of equal in hot tea every morning).  S/p covid infection in March, states that kidneys were affected, most recent labs WNL. He denies hematuria, urgency and frequency.  He denies fever, chills, nausea, vomiting.  Denies flank pain.     The following portions of the patient's history were reviewed and updated as appropriate: allergies, current medications, past family history, past medical history, past social history, past surgical history and problem list.    Review of Systems  Constitutional: no fever or chills  ENT: no nasal congestion or sore throat  Respiratory: no cough or shortness of breath  Cardiovascular: no chest pain or palpitations  Gastrointestinal: no nausea or vomiting, tolerating diet  Genitourinary: as per HPI  Hematologic/Lymphatic: no easy bruising or lymphadenopathy  Musculoskeletal: no arthralgias or myalgias  Neurological: no seizures or tremors  Behavioral/Psych: no auditory or visual hallucinations     Objective:   Vitals: BP (!) 159/94 (BP Location: Right arm, Patient Position: Sitting, BP Method: Large (Automatic))   Pulse (!) 59   Resp 16   Ht 5' 8" (1.727 m)   Wt 124.1 kg (273 lb 9.5 oz)   BMI 41.60 kg/m²     Physical Exam   General: alert and oriented, no acute distress  Head: normocephalic, atraumatic  Neck: supple, no lymphadenopathy, normal ROM, no masses  Respiratory: Symmetric " expansion, non-labored breathing  Cardiovascular: regular rate and rhythm, nomal pulses, no peripheral edema  Abdomen: soft, non tender, non distended, no palpable masses, no hernias, no hepatomegaly or splenomegaly  Genitourinary: defer  Lymphatic: no inguinal nodes  Skin: normal coloration and turgor, no rashes, no suspicious skin lesions noted  Neuro: alert and oriented x3, no gross deficits  Psych: normal judgment and insight, normal mood/affect and non-anxious  No CVA tenderness bilaterally   Physical Exam    Lab Review     Lab Results   Component Value Date    WBC 11.90 09/06/2020    HGB 14.1 09/06/2020    HCT 45.2 09/06/2020    HCT 36 04/01/2020    MCV 89 09/06/2020     09/06/2020     Lab Results   Component Value Date    CREATININE 1.1 09/16/2020    BUN 18 09/16/2020     No results found for: PSA  Imaging  CT ABDOMEN PELVIS WITHOUT CONTRAST     CLINICAL HISTORY:  Flank pain, kidney stone suspected;     TECHNIQUE:  Low dose axial images, sagittal and coronal reformations were obtained from the lung bases to the pubic symphysis.     COMPARISON:  None     FINDINGS:  The inferior aspect of the lung parenchyma is free of any specific focal abnormality.  There is a small amount of atelectasis appreciated on the right side.  The cardiac silhouette is within normal limits.     Imaging below the diaphragm reveals that there is mild hydronephrosis and hydroureter involving the right kidney and ureter.  There is no obvious calcification seen in the lumen of the ureter however near the right UVJ there is a stone seen within the lumen of the bladder which measures approximately 2-3 mm.  The left kidney is totally unremarkable.  No indication dilatation of the left ureter.     The liver, adrenal glands, pancreas gallbladder and spleen all appear to be within normal limits.     No indication of free air free fluid within the peritoneal cavity.  No indication of mesenteric or retroperitoneal lymphadenopathy.     The  stomach small bowel and large bowel are unremarkable within the abdomen.  Within the pelvic region the appendix is clearly appreciated right pericolic gutter.  The prostate and rectum are unremarkable.     Impression:     Passage of a 2-3 mm kidney stone on the right-hand side with residual hydronephrosis and hydroureter also on the right.        Electronically signed by: Juan Luis Dale  Date:                                            09/06/2020  Time:                                           22:34            Last Resulted: 09/06/20 22:34          Assessment:     1. Dysuria    2. Renal stones    3. Hydronephrosis, unspecified hydronephrosis type      Plan:     Orders Placed This Encounter    Urine culture    Urinary Stone Analysis    Urinalysis Microscopic     --Will send urine for UA and culture; instructed pt to decrease diet soda intake and limit equal consumption.  --Will send stone for analysis    --We also discussed how to prevent future stones:   --Eat fewer high-oxalate foods (spinach, bran flakes, rhubarb, beets, potato chips, french fries, nuts and nut butters)   --Eat calcium rich foods   --Limit the vitamin C content of your diet. Do not take more than 500 mg of Vitamin C daily.   --It is very important to drink plenty of liquids. Your goal should be 10-12 glasses a day. At least 5-6 glasses should be water.    --Reduce sodium intake: 2-3 grams per day. Limit eating processed foods such as hot dogs, deli meats, sausage, canned products, dry soup mixes, sauerkraut, pickles, and various convenience mixes.   --Add citrus to diet i.e. lemonade, limes, orange juice.     --Follow up in 1 month with renal US to assess hydronephrosis

## 2020-09-23 ENCOUNTER — OFFICE VISIT (OUTPATIENT)
Dept: UROLOGY | Facility: CLINIC | Age: 45
End: 2020-09-23
Payer: COMMERCIAL

## 2020-09-23 ENCOUNTER — TELEPHONE (OUTPATIENT)
Dept: SPORTS MEDICINE | Facility: CLINIC | Age: 45
End: 2020-09-23

## 2020-09-23 ENCOUNTER — OFFICE VISIT (OUTPATIENT)
Dept: SPORTS MEDICINE | Facility: CLINIC | Age: 45
End: 2020-09-23
Payer: COMMERCIAL

## 2020-09-23 VITALS
BODY MASS INDEX: 41.1 KG/M2 | WEIGHT: 271.19 LBS | SYSTOLIC BLOOD PRESSURE: 175 MMHG | HEIGHT: 68 IN | DIASTOLIC BLOOD PRESSURE: 85 MMHG | HEART RATE: 53 BPM

## 2020-09-23 VITALS
DIASTOLIC BLOOD PRESSURE: 94 MMHG | BODY MASS INDEX: 41.46 KG/M2 | RESPIRATION RATE: 16 BRPM | HEART RATE: 59 BPM | HEIGHT: 68 IN | SYSTOLIC BLOOD PRESSURE: 159 MMHG | WEIGHT: 273.56 LBS

## 2020-09-23 DIAGNOSIS — R30.0 DYSURIA: Primary | ICD-10-CM

## 2020-09-23 DIAGNOSIS — N20.0 RENAL STONES: ICD-10-CM

## 2020-09-23 DIAGNOSIS — N13.30 HYDRONEPHROSIS, UNSPECIFIED HYDRONEPHROSIS TYPE: ICD-10-CM

## 2020-09-23 DIAGNOSIS — S83.002A SUBLUXATION OF LEFT PATELLA, INITIAL ENCOUNTER: Primary | ICD-10-CM

## 2020-09-23 DIAGNOSIS — M25.562 LEFT KNEE PAIN, UNSPECIFIED CHRONICITY: ICD-10-CM

## 2020-09-23 LAB
MICROSCOPIC COMMENT: NORMAL
RBC #/AREA URNS HPF: 1 /HPF (ref 0–4)

## 2020-09-23 PROCEDURE — 3008F PR BODY MASS INDEX (BMI) DOCUMENTED: ICD-10-PCS | Mod: CPTII,S$GLB,, | Performed by: PHYSICIAN ASSISTANT

## 2020-09-23 PROCEDURE — 3077F SYST BP >= 140 MM HG: CPT | Mod: CPTII,S$GLB,, | Performed by: NURSE PRACTITIONER

## 2020-09-23 PROCEDURE — 3079F DIAST BP 80-89 MM HG: CPT | Mod: CPTII,S$GLB,, | Performed by: PHYSICIAN ASSISTANT

## 2020-09-23 PROCEDURE — 99204 PR OFFICE/OUTPT VISIT, NEW, LEVL IV, 45-59 MIN: ICD-10-PCS | Mod: S$GLB,,, | Performed by: PHYSICIAN ASSISTANT

## 2020-09-23 PROCEDURE — 99999 PR PBB SHADOW E&M-EST. PATIENT-LVL IV: CPT | Mod: PBBFAC,,, | Performed by: PHYSICIAN ASSISTANT

## 2020-09-23 PROCEDURE — 3008F BODY MASS INDEX DOCD: CPT | Mod: CPTII,S$GLB,, | Performed by: NURSE PRACTITIONER

## 2020-09-23 PROCEDURE — 87086 URINE CULTURE/COLONY COUNT: CPT

## 2020-09-23 PROCEDURE — 99999 PR PBB SHADOW E&M-EST. PATIENT-LVL V: ICD-10-PCS | Mod: PBBFAC,,, | Performed by: NURSE PRACTITIONER

## 2020-09-23 PROCEDURE — 99204 OFFICE O/P NEW MOD 45 MIN: CPT | Mod: S$GLB,,, | Performed by: NURSE PRACTITIONER

## 2020-09-23 PROCEDURE — 3078F DIAST BP <80 MM HG: CPT | Mod: CPTII,S$GLB,, | Performed by: NURSE PRACTITIONER

## 2020-09-23 PROCEDURE — 3077F SYST BP >= 140 MM HG: CPT | Mod: CPTII,S$GLB,, | Performed by: PHYSICIAN ASSISTANT

## 2020-09-23 PROCEDURE — 3077F PR MOST RECENT SYSTOLIC BLOOD PRESSURE >= 140 MM HG: ICD-10-PCS | Mod: CPTII,S$GLB,, | Performed by: PHYSICIAN ASSISTANT

## 2020-09-23 PROCEDURE — 81001 URINALYSIS AUTO W/SCOPE: CPT

## 2020-09-23 PROCEDURE — 3008F PR BODY MASS INDEX (BMI) DOCUMENTED: ICD-10-PCS | Mod: CPTII,S$GLB,, | Performed by: NURSE PRACTITIONER

## 2020-09-23 PROCEDURE — 3078F PR MOST RECENT DIASTOLIC BLOOD PRESSURE < 80 MM HG: ICD-10-PCS | Mod: CPTII,S$GLB,, | Performed by: NURSE PRACTITIONER

## 2020-09-23 PROCEDURE — 3079F PR MOST RECENT DIASTOLIC BLOOD PRESSURE 80-89 MM HG: ICD-10-PCS | Mod: CPTII,S$GLB,, | Performed by: PHYSICIAN ASSISTANT

## 2020-09-23 PROCEDURE — 82365 CALCULUS SPECTROSCOPY: CPT

## 2020-09-23 PROCEDURE — 3077F PR MOST RECENT SYSTOLIC BLOOD PRESSURE >= 140 MM HG: ICD-10-PCS | Mod: CPTII,S$GLB,, | Performed by: NURSE PRACTITIONER

## 2020-09-23 PROCEDURE — 3008F BODY MASS INDEX DOCD: CPT | Mod: CPTII,S$GLB,, | Performed by: PHYSICIAN ASSISTANT

## 2020-09-23 PROCEDURE — 99204 OFFICE O/P NEW MOD 45 MIN: CPT | Mod: S$GLB,,, | Performed by: PHYSICIAN ASSISTANT

## 2020-09-23 PROCEDURE — 99999 PR PBB SHADOW E&M-EST. PATIENT-LVL V: CPT | Mod: PBBFAC,,, | Performed by: NURSE PRACTITIONER

## 2020-09-23 PROCEDURE — 99204 PR OFFICE/OUTPT VISIT, NEW, LEVL IV, 45-59 MIN: ICD-10-PCS | Mod: S$GLB,,, | Performed by: NURSE PRACTITIONER

## 2020-09-23 PROCEDURE — 99999 PR PBB SHADOW E&M-EST. PATIENT-LVL IV: ICD-10-PCS | Mod: PBBFAC,,, | Performed by: PHYSICIAN ASSISTANT

## 2020-09-23 NOTE — LETTER
September 23, 2020      Edis Alexander Jr., MD  [address]  [facility] [address]           [facility]  [address], [facility] [address]  [facility] [address]  Phone: [phone]  Fax: [phone]          Patient: Deepak Tobias   MR Number: [id number]   YOB: 1975   Date of Visit: 9/23/2020       Dear Dr. Edis Alexander Jr.:    Thank you for referring Deepak Tobias to me for evaluation. Attached you will find relevant portions of my assessment and plan of care.    If you have questions, please do not hesitate to call me. I look forward to following Deepak Tobias along with you.    Sincerely,    Melissa Huizar, NP    Enclosure  CC:  No Recipients    If you would like to receive this communication electronically, please contact [email or web address] or [phone] to request more information on [employer] Link access.    For providers and/or their staff who would like to refer a patient to [facility], please contact us through our one-stop-shop provider referral line, [facility], at [phone].    If you feel you have received this communication in error or would no longer like to receive these types of communications, please e-mail [email or web address]

## 2020-09-23 NOTE — PATIENT INSTRUCTIONS
Preventing Kidney Stones  If youve had a kidney stone, you may worry that youll have another. Removing or passing your stone doesnt prevent future stones. But with your healthcare providers help, you can reduce your risk of forming new stones. Follow up with your healthcare provider to help find new stones. You may need follow-up every 3 months to a year for a lifetime.    Drink lots of water  Staying well-hydrated is the best way to reduce your risk of future stones. Drink 8 12-ounce glasses of water daily. Have 2 with each meal and 2 between meals. Try keeping a pitcher of water nearby during the day and at night.  Take medicines if needed  Medicines, including vitamins and minerals, may be prescribed for certain types of stones. You may want to write your doses and medicine times on a calendar. Some medicines decrease stone-forming chemicals in your blood. Others help prevent those chemicals from crystallizing in urine. Still others help keep a normal acid balance in your urine.  Follow your prescribed diet  Your healthcare provider will tell you which foods contain the chemicals you should avoid. Your healthcare provider may also suggest talking to a dietitian. He or she can help you plan meals youll enjoy. These meals wont put you at risk for future stones. You may be told to limit certain foods, depending on which type of stones youve had. You should limit the amount of salt in your food to about 2 grams a day. This will help prevent most types of kidney stones. Make sure you get an adequate amount of calcium in your diet.  For calcium oxalate stones: Limit animal protein, such as meat, eggs, and fish. Limit grapefruit juice and alcohol. Limit high-oxalate foods (such as cola, tea, chocolate, spinach, rhubarb, wheat bran, and peanuts).  For uric acid stones: Limit high-purine foods, such as mushrooms, peas, beans, anchovies, meat, poultry, shellfish, and organ meats. These foods increase uric acid  production.  For cystine stones: Limit high-methionine foods (fish is the most common, but eggs and meats, also). These foods increase production of cystine.  Date Last Reviewed: 2/1/2017 © 2000-2017 [employer]. [address] [place] [address], [place] [address]. All rights reserved. This information is not intended as a substitute for professional medical care. Always follow your healthcare professional's instructions.        Kidney Stone, Passed    A kidney stone (nephrolithiasis) starts as tiny crystals that form inside the kidney where urine is made. Most kidney stones enlarge to about 1/8 to 1/4 inch in size before leaving the kidney and moving toward the bladder. The sharp, cramping pain and nausea/vomiting you had was the stone moving through the ureter (the narrow tube joining the kidney to the bladder). Once the stone reaches your bladder, the pain stops. Pain may start again as the stone passes through the bladder and out through the urethra. There are 4 types of kidney stones. Eighty percent are calcium stones--mostly calcium oxalate but also some with calcium phosphate. The other 3 types include uric acid stones, struvite stones (from a preceding infection), and rarely, cystine stones.  Home care  The following guidelines will help you care for yourself at home:  · Drink plenty of fluids. This increases urine flow and reduces the chance that a new stone will form. Healthy adults (no heart/liver/kidney disease) who have had a kidney stone should drink 12, 8-ounce glasses of fluids per day. Most of this should be water. The goal is to produce 1.5 to 2 quarts of almost colorless urine per 24 hours.  · Unless another NSAID (non-steroidal anti-inflammatory drug) was given, you may take ibuprofen or naproxen in addition to any narcotic pain medicine your healthcare provider prescribes. If you have chronic liver or kidney disease or ever had a stomach ulcer or GI bleeding, talk with your healthcare  provider before using these medicines.  · Collecting the stone: If you were given a strainer, urinate into a jar then pour the urine through the strainer and into the toilet. Keep doing this for 24 hours after your pain stops. Save any stone that you find in the strainer and bring it to your healthcare provider for analysis. If you do not collect a stone, a 24-hour urine specimen can be done at a later time by your healthcare provider to figure out the cause of your stone.  Prevention  Each year, there is a chance that a new stone will form (50% chance over the next 5 to 7 years). The risk is highest if you have a family history of kidney stones or have certain chronic illnesses such as hypertension, obesity, or diabetes. However, there are lifestyle and dietary changes that you can make to reduce the risk of getting another kidney stone.  Most kidney stones are made of calcium. The following advice can help you prevent calcium stones. If you dont know the type of stone you have, follow this advice until the healthcare provider determines the cause of your stone.  Things that help:  · The most important thing you can do is to drink plenty of fluids each day, as described above.  · Certain foods, such as wheat, rice, rye, barley, and beans, contain phytate. Phytate is a compound that may lower the risk of recurrence of any type of stone.  · Eat more fruits and vegetables, especially those high in potassium.  · Eat foods high in natural citrate like fruit and fruit juices (using low sugar).  · Low calcium contributes to calcium type kidney stones. Eat a normal calcium diet and talk with your healthcare provider if you are taking calcium supplements. It may be detrimental to lower your calcium intake. New research shows that eating calcium-rich and oxalate-rich foods together lowers your risk of stones. This is because eating these foods together binds the minerals in the stomach and intestines before they can reach  the kidneys.  · Limit salt intake to 2 grams (1 teaspoon) per day. Use limited amounts when cooking, and dont add salt at the table. Processed and canned foods are usually high in salt.  · Spinach, rhubarb, peanuts, cashews, almonds, grapefruit, and grapefruit juice are all high oxalate foods and should be reduced, or eaten with calcium-rich foods. These foods include dairy, dark leafy greens, soy products, calcium-enriched foods, and others.  · Reduce the amount of animal meat and high protein foods in your diet. This may lower your risk of uric acid stones.  · Avoid excess sugar (sucrose) and fructose (sweetener in many soft drinks) in your diet.  · If you take vitamin C as a supplement, don't take more than 1,000 milligrams (mg) per day.  · A dietitian or your healthcare provider can give you ideas on how to change your diet to prevent more kidney stones.  Follow-up care  Follow up with your healthcare provider, or as advised. Even if you don't collect the kidney stone, it is possible to analyze a 24-hour urine collection for the cause of this stone. Discuss this with your healthcare provider.  If you had an X-ray, CT scan, or other diagnostic test, you will be told of any findings that may affect your care.  Call 911  Call 911 if you have any of these:  · Weakness, dizziness, or fainting  When to seek medical advice  Call your healthcare provider if any of the these occur:  · Severe pain that returns and not relieved by pain medicines  · Repeated vomiting or unable to keep down fluids  · Fever of 100.4ºF (38ºC) or higher, or as directed by your healthcare provider  · Blood clots in urine  · Foul smelling or cloudy urine  · Unable to pass urine for 8 hours or increasing bladder pressure  Date Last Reviewed: 10/1/2016  © 8700-3712 [employer]. [address] [place] [address], [place] [address]. All rights reserved. This information is not intended as a substitute for professional medical care. Always follow  your healthcare professional's instructions.

## 2020-09-23 NOTE — LETTER
September 23, 2020      Edis Alexander Jr., MD  [address]  [facility] [address]           [facility]  [address]  [facility] [address]  Phone: [phone]  Fax: [phone]          Patient: Deepak Tobias   MR Number: [id number]   YOB: 1975   Date of Visit: 9/23/2020       Dear Dr. Edis Alexander Jr.:    Thank you for referring Deepak Tobias to me for evaluation. Attached you will find relevant portions of my assessment and plan of care.    If you have questions, please do not hesitate to call me. I look forward to following Deepak Tobias along with you.    Sincerely,    Nasim Abarca PA-C    Enclosure  CC:  No Recipients    If you would like to receive this communication electronically, please contact [email or web address] or [phone] to request more information on [facility] Link access.    For providers and/or their staff who would like to refer a patient to [facility], please contact us through our one-stop-shop provider referral line, [facility], at [phone].    If you feel you have received this communication in error or would no longer like to receive these types of communications, please e-mail [email or web address]

## 2020-09-23 NOTE — PROGRESS NOTES
CC: LEFT knee pain    45 y.o. Male who presents as a new patient to me. Complaint is left knee pain x about 1 month or so, however 3 weeks ago he felt a pop in the front of his knee when putting on his pants.  He states he was going to the emergency department for flank pain when he stepped down to put his pant leg on he felt a pop over the anterior aspect of his knee.  Since the injury occurred, he states the pain has noticeably improved.  He localizes most of the discomfort today over the medial lateral aspect of the patellar region.  The pain again is described as slight discomfort, 2/10 in clinic today.  He denies mechanical symptoms or instability.  He states he is an avid runner and would like to get back to exercising as he has put on 20 lb recently.  He states he has never noticed any swelling in his knee.  The pain or discomfort is worse with squatting, ambulating up and down stairs.  The pain is better with rest and anti-inflammatory medication.  He states that he has also tried corrective shoe inserts as he was told previously that he has excess inversion of bilateral feet.  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       REVIEW OF SYSTEMS:   Constitution: Negative. Negative for chills, fever and night sweats.    Hematologic/Lymphatic: Negative for bleeding problem. Does not bruise/bleed easily.   Skin: Negative for dry skin, itching and rash.   Musculoskeletal: Negative for falls. Positive for left knee pain and muscle weakness.     All other review of symptoms were reviewed and found to be noncontributory.     PAST MEDICAL HISTORY:   Past Medical History:   Diagnosis Date    Diabetes mellitus     Hyperlipidemia     Hypertension     Obesity     Sleep apnea        PAST SURGICAL HISTORY:   Past Surgical History:   Procedure Laterality Date    achilies tendon         FAMILY  HISTORY:   Family History   Problem Relation Age of Onset    Hypertension Father     Cancer Father     Blindness Mother     Diabetes Maternal Uncle     Acne Neg Hx     Melanoma Neg Hx     Amblyopia Neg Hx     Cataracts Neg Hx     Glaucoma Neg Hx     Macular degeneration Neg Hx     Retinal detachment Neg Hx     Strabismus Neg Hx     Stroke Neg Hx     Thyroid disease Neg Hx        SOCIAL HISTORY:   Social History     Socioeconomic History    Marital status:      Spouse name: Not on file    Number of children: Not on file    Years of education: Not on file    Highest education level: Not on file   Occupational History     Employer: [employer]   Social Needs    Financial resource strain: Not very hard    Food insecurity     Worry: Never true     Inability: Never true    Transportation needs     Medical: No     Non-medical: No   Tobacco Use    Smoking status: Never Smoker    Smokeless tobacco: Never Used   Substance and Sexual Activity    Alcohol use: No     Frequency: Never     Drinks per session: Patient refused     Binge frequency: Never    Drug use: No    Sexual activity: Yes     Partners: Female   Lifestyle    Physical activity     Days per week: 6 days     Minutes per session: 60 min    Stress: Only a little   Relationships    Social connections     Talks on phone: More than three times a week     Gets together: Once a week     Attends Worship service: Not on file     Active member of club or organization: No     Attends meetings of clubs or organizations: Never     Relationship status:    Other Topics Concern    Not on file   Social History Narrative    Not on file       MEDICATIONS:     Current Outpatient Medications:     albuterol (PROAIR HFA) 90 mcg/actuation inhaler, Inhale 2 puffs into the lungs every 6 (six) hours as needed for Wheezing or Shortness of Breath. Rescue, Disp: 18 g, Rfl: 2    amLODIPine (NORVASC) 10 MG tablet, TAKE ONE TABLET BY MOUTH DAILY,  "Disp: 90 tablet, Rfl: 2    blood sugar diagnostic Strp, 1 strip by Misc.(Non-Drug; Combo Route) route 2 (two) times daily with meals., Disp: 100 strip, Rfl: 11    blood-glucose meter (ONETOUCH VERIO SYSTEM) Misc, 1 Units by Misc.(Non-Drug; Combo Route) route once daily., Disp: 1 each, Rfl: 0    clindamycin-tretinoin (ZIANA) gel, , Disp: , Rfl:     insulin (LANTUS SOLOSTAR U-100 INSULIN) glargine 100 units/mL (3mL) SubQ pen, Inject 10 Units into the skin every evening., Disp: 1 Box, Rfl: 15    lancets Misc, 1 lancet by Misc.(Non-Drug; Combo Route) route 2 (two) times daily with meals., Disp: 100 each, Rfl: 11    lancing device Misc, 1 Device by Misc.(Non-Drug; Combo Route) route 2 (two) times daily with meals., Disp: 1 each, Rfl: 0    metFORMIN (GLUCOPHAGE) 500 MG tablet, Take 1 tablet (500 mg total) by mouth 2 (two) times daily with meals., Disp: 180 tablet, Rfl: 3    pen needle, diabetic (NOVOFINE PLUS) 32 gauge x 1/6" Ndle, 1 pen by Misc.(Non-Drug; Combo Route) route 3 (three) times daily., Disp: 100 each, Rfl: 15    pravastatin (PRAVACHOL) 20 MG tablet, TAKE ONE TABLET BY MOUTH DAILY, Disp: 90 tablet, Rfl: 2    valsartan-hydrochlorothiazide (DIOVAN-HCT) 320-25 mg per tablet, Take 1 tablet by mouth once daily., Disp: 90 tablet, Rfl: 3    ALLERGIES:   Review of patient's allergies indicates:   Allergen Reactions    Keflex [cephalexin] Rash        PHYSICAL EXAMINATION:  There were no vitals taken for this visit.  General: Well-developed well-nourished 45 y.o. malein no acute distress   Cardiovascular: Regular rhythm by palpation of distal pulse, normal color and temperature, no concerning varicosities on symptomatic side   Lungs: No labored breathing or wheezing appreciated   Neuro: Alert and oriented ×3   Psychiatric: well oriented to person, place and time, demonstrates normal mood and affect   Skin: No rashes, lesions or ulcers, normal temperature, turgor, and texture on involved extremity    Ortho/SPM " Exam  Intact extensor mechanism. No effusion or prepatellar swelling. lateral patellar tracking. No patellar apprehension.  Mild tenderness palpation over the medial and lateral edge of patella. Normal patellar mobility. Full extension. Flexion to 130. No pain with forced flexion or extension. No prominent tenderness along the medial and lateral joint line. Negative Rachael's. Negative Lachman. Stable to varus/valgus stress testing at 0 and 30 deg. Negative posterior drawer. Ligamentously stable.      IMAGING:  X-rays including standing, weight bearing AP and flexion bilateral knees, LEFT knee lateral and sunrise views ordered and images reviewed by me show:    Well-maintained joint spaces without evidence of fracture or dislocation.  There is evidence of previous Osgood Ty disease over his tibial tubercle.    ASSESSMENT:      ICD-10-CM ICD-9-CM   1. Subluxation of left patella, initial encounter  S83.002A 836.3   2. Left knee pain, unspecified chronicity  M25.562 719.46       PLAN:       -Findings and treatment options were discussed with the patient  -patient is taking anti-inflammatory medication as needed for pain.  He states that he does not have much pain at all clinic today so I do not believe we need to take any further aggressive treatment.  -I discussed providing him with a knee brace, however states he has any brace that he can try at home.  Advised that he wear this knee brace while exercising.  I also discussed with him that he should started running exercises at a very low distance and slowly improved to avoid increasing soreness and knee.  -RTC as needed for left knee pain, if symptoms worsen or he begins to have any mechanical symptoms or instability I will discussed possible physical therapy versus further imaging.   -All questions answered      Procedures

## 2020-09-23 NOTE — TELEPHONE ENCOUNTER
Spoke with patient Deepak to see if he can come in earlier, patient state that he has an appointment at 10 am today and he not sure how long that going to last but he will be here as soon as he finish with urology appointment.

## 2020-09-24 ENCOUNTER — TELEPHONE (OUTPATIENT)
Dept: UROLOGY | Facility: CLINIC | Age: 45
End: 2020-09-24

## 2020-09-24 LAB — BACTERIA UR CULT: NO GROWTH

## 2020-09-28 LAB
COMPN STONE: NORMAL
SPECIMEN SOURCE: NORMAL
STONE ANALYSIS IR-IMP: NORMAL

## 2020-12-10 ENCOUNTER — PATIENT OUTREACH (OUTPATIENT)
Dept: ADMINISTRATIVE | Facility: OTHER | Age: 45
End: 2020-12-10

## 2020-12-10 NOTE — PROGRESS NOTES
Care Everywhere:  Immunization: updated, links delay   Health Maintenance: updated  Media Review:   Legacy Review:   Order placed:   Upcoming appts:

## 2020-12-10 NOTE — PROGRESS NOTES
RHEUMATOLOGY CLINIC INITIAL VISIT    Reason for referral:-  Referred for evaluation of bilateral hand pain     Chief complaints:- Diffused arthralgia       HPI:-  Deepak Shin a 45 y.o. pleasant male with PMH of DM, HTN, HLPD, Obesity, and SPURIYA comes in for an initial visit with above chief complaints.     Patient complaining of diffused joint pain.  Patient states that he initially have neck (cervical region) discomfort that started about 10 years.  Then bilateral hand pain that progressed to the wrist (about 8 years ago).  Then ankles and bilateral feet pain - so bad that patient unable to run anymore.  Pain is dull aching sensation that is constant.  AM stiffness for about 1.5 hours.  Improvement with activity.  Difficulty with doing fine motor stuff - ie. Dropping stuff, using scissors. Ibuprofen and heat alleviates some of the symptoms.  Increased activity worsen the symptoms.       Fhx: Grandmother with arthritis (uncertain of the type). No thyroid disease, psoriasis, IBD     Tobacco (denies), EtOH (denies), recreational/illicit drugs (denies)     Occupation: [employer].  Works with [employer] occasionally     Hx of clots - denies     ROS: Denies any mouth ulcers, Raynaud's, unintentional weight loss, SOB, CP, joint swelling, myalgia, urinary/bowel symptoms, N/V, fever/chills, Sicca symptoms, recent travels/sick contacts, depression, insomnia, hair loss    Rash of the R hand and elbow area that started about 15 years ago.  Resolves with coconut oil. New spot formation of the R knee. Visual photosensitivity (seen optometry in Sept 2020) - DM retinopathy.     Review of Systems   Constitutional: Negative for chills, diaphoresis, fever, malaise/fatigue and weight loss.   HENT: Negative for congestion, ear discharge, ear pain, hearing loss, nosebleeds, sinus pain and tinnitus.    Eyes: Negative for photophobia, pain, discharge and redness.   Respiratory: Negative for cough, hemoptysis, sputum production, shortness of breath,  "wheezing and stridor.    Cardiovascular: Negative for chest pain, palpitations, orthopnea, claudication, leg swelling and PND.   Gastrointestinal: Negative for abdominal pain, constipation, diarrhea, heartburn, nausea and vomiting.   Genitourinary: Negative for dysuria, frequency, hematuria and urgency.   Musculoskeletal: Positive for joint pain. Negative for back pain, myalgias and neck pain.   Skin: Positive for rash.   Neurological: Positive for weakness. Negative for dizziness, tingling, tremors and headaches.   Endo/Heme/Allergies: Does not bruise/bleed easily.   Psychiatric/Behavioral: Negative for depression, hallucinations and suicidal ideas. The patient is not nervous/anxious and does not have insomnia.        Past Medical History:   Diagnosis Date    Diabetes mellitus     Hyperlipidemia     Hypertension     Obesity     Sleep apnea        Past Surgical History:   Procedure Laterality Date    achilies tendon          Social History     Tobacco Use    Smoking status: Never Smoker    Smokeless tobacco: Never Used   Substance Use Topics    Alcohol use: No     Frequency: Never     Drinks per session: Patient refused     Binge frequency: Never    Drug use: No       Family History   Problem Relation Age of Onset    Hypertension Father     Cancer Father     Blindness Mother     Diabetes Maternal Uncle     Acne Neg Hx     Melanoma Neg Hx     Amblyopia Neg Hx     Cataracts Neg Hx     Glaucoma Neg Hx     Macular degeneration Neg Hx     Retinal detachment Neg Hx     Strabismus Neg Hx     Stroke Neg Hx     Thyroid disease Neg Hx        Review of patient's allergies indicates:   Allergen Reactions    Keflex [cephalexin] Rash       Vitals:    12/11/20 1419   BP: (!) 153/88   Pulse: 60   Weight: 129.7 kg (286 lb)   Height: 5' 7.2" (1.707 m)   PainSc:   2       Physical Exam   Constitutional: He is oriented to person, place, and time and well-developed, well-nourished, and in no distress.   HENT: "   Head: Normocephalic and atraumatic.   Eyes: Pupils are equal, round, and reactive to light. EOM are normal.   Neck: Normal range of motion. Neck supple.   Cardiovascular: Normal rate, regular rhythm and normal heart sounds.   Pulmonary/Chest: Effort normal and breath sounds normal.   Abdominal: Soft. Bowel sounds are normal.   Musculoskeletal: Normal range of motion.      Comments: No signs of synovitis, dactylitis, or enthesitis   Strength and ROM intact  Negative tinel and Phalen's sign    Neurological: He is alert and oriented to person, place, and time. Gait normal. GCS score is 15.   Skin: Skin is warm and dry. No rash noted. No erythema.   Hypopigmented rash on the R 5th MCP - quarter sized   Raised bumps in the R elbow and R knee   No nail changes in bilateral hands and feet    Psychiatric: Mood, memory, affect and judgment normal.       Labs:-  Results for RICHIE JANG (MRN [id number]) as of 12/11/2020 19:32   Ref. Range 9/16/2020 08:40 9/23/2020 10:38 9/23/2020 10:39 12/11/2020 15:27 12/11/2020 16:04   WBC Latest Ref Range: 3.90 - 12.70 K/uL    6.45    RBC Latest Ref Range: 4.60 - 6.20 M/uL    4.97    Hemoglobin Latest Ref Range: 14.0 - 18.0 g/dL    13.9 (L)    Hematocrit Latest Ref Range: 40.0 - 54.0 %    44.7    MCV Latest Ref Range: 82 - 98 fL    90    MCH Latest Ref Range: 27.0 - 31.0 pg    28.0    MCHC Latest Ref Range: 32.0 - 36.0 g/dL    31.1 (L)    RDW Latest Ref Range: 11.5 - 14.5 %    13.1    Platelets Latest Ref Range: 150 - 350 K/uL    298    MPV Latest Ref Range: 9.2 - 12.9 fL    10.6    Gran % Latest Ref Range: 38.0 - 73.0 %    62.0    Lymph % Latest Ref Range: 18.0 - 48.0 %    25.7    Mono % Latest Ref Range: 4.0 - 15.0 %    5.6    Eosinophil % Latest Ref Range: 0.0 - 8.0 %    5.1    Basophil % Latest Ref Range: 0.0 - 1.9 %    1.1    Immature Granulocytes Latest Ref Range: 0.0 - 0.5 %    0.5    Gran # (ANC) Latest Ref Range: 1.8 - 7.7 K/uL    4.0    Lymph # Latest Ref Range: 1.0 - 4.8 K/uL     1.7    Mono # Latest Ref Range: 0.3 - 1.0 K/uL    0.4    Eos # Latest Ref Range: 0.0 - 0.5 K/uL    0.3    Baso # Latest Ref Range: 0.00 - 0.20 K/uL    0.07    Immature Grans (Abs) Latest Ref Range: 0.00 - 0.04 K/uL    0.03    nRBC Latest Ref Range: 0 /100 WBC    0    Differential Method Unknown    Automated    Sed Rate Latest Ref Range: 0 - 23 mm/Hr    27 (H)    Sodium Latest Ref Range: 136 - 145 mmol/L 141   143    Potassium Latest Ref Range: 3.5 - 5.1 mmol/L 3.5   3.6    Chloride Latest Ref Range: 95 - 110 mmol/L 103   106    CO2 Latest Ref Range: 23 - 29 mmol/L 28   24    Anion Gap Latest Ref Range: 8 - 16 mmol/L 10   13    BUN Latest Ref Range: 6 - 20 mg/dL 18   19    Creatinine Latest Ref Range: 0.5 - 1.4 mg/dL 1.1   1.2    eGFR if non African American Latest Ref Range: >60 mL/min/1.73 m^2 >60.0   >60.0    eGFR if African American Latest Ref Range: >60 mL/min/1.73 m^2 >60.0   >60.0    Glucose Latest Ref Range: 70 - 110 mg/dL 118 (H)   124 (H)    Calcium Latest Ref Range: 8.7 - 10.5 mg/dL 9.7   9.8    Alkaline Phosphatase Latest Ref Range: 55 - 135 U/L 49 (L)   55    PROTEIN TOTAL Latest Ref Range: 6.0 - 8.4 g/dL 7.8   8.1    Albumin Latest Ref Range: 3.5 - 5.2 g/dL 4.1   4.1    Uric Acid Latest Ref Range: 3.4 - 7.0 mg/dL    7.0    BILIRUBIN TOTAL Latest Ref Range: 0.1 - 1.0 mg/dL 0.4   0.4    AST Latest Ref Range: 10 - 40 U/L 18   21    ALT Latest Ref Range: 10 - 44 U/L 20   28    CRP Latest Ref Range: 0.0 - 8.2 mg/L    3.9    Triglycerides Latest Ref Range: 30 - 150 mg/dL 107       Cholesterol Latest Ref Range: 120 - 199 mg/dL 154       HDL Latest Ref Range: 40 - 75 mg/dL 41       HDL/Cholesterol Ratio Latest Ref Range: 20.0 - 50.0 % 26.6       LDL Cholesterol External Latest Ref Range: 63.0 - 159.0 mg/dL 91.6       Non-HDL Cholesterol Latest Units: mg/dL 113       Total Cholesterol/HDL Ratio Latest Ref Range: 2.0 - 5.0  3.8       Hemoglobin A1C External Latest Ref Range: 4.0 - 5.6 % 5.5       Estimated Avg  Glucose Latest Ref Range: 68 - 131 mg/dL 111       TSH Latest Ref Range: 0.400 - 4.000 uIU/mL    2.525    Free T4 Latest Ref Range: 0.71 - 1.51 ng/dL    0.95    CCP Antibodies Latest Ref Range: <5.0 U/mL    0.9    Rheumatoid Factor Latest Ref Range: 0.0 - 15.0 IU/mL    <10.0    Hepatitis C Ab Latest Ref Range: Negative  Negative       RBC, UA Latest Ref Range: 0 - 4 /hpf   1     Microscopic Comment Unknown   SEE COMMENT     CULTURE, URINE Unknown  Rpt      Stone Analysis Unknown  Test Not Performed      Stone Analysis Comment Unknown  100% Calcium oxalate monohydrate      Stone Source Unknown  Bladder      XR ARTHRITIS SURVEY Unknown     Rpt   XR SHOULDER COMPLETE 2 OR MORE VIEWS BILATERAL Unknown     Rpt     Radiographs:-  Independent visualization of images done.  Sept 2020 - left knee x-ray - possible residual [facility] Ty syndrome   CT abdomen and pelvis - passage of 2-3 mm kidney stones on the right side with residual hydronephrosis and hydroureter on the right.  August 2015 - left shoulder x-ray - mild DJD   bilateral hand x-ray - normal    Old and Outside medical records :-  Reviewed old and all outside medical records available in Care Everywhere.    Medication List with Changes/Refills   Current Medications    ALBUTEROL (PROAIR HFA) 90 MCG/ACTUATION INHALER    Inhale 2 puffs into the lungs every 6 (six) hours as needed for Wheezing or Shortness of Breath. Rescue    AMLODIPINE (NORVASC) 10 MG TABLET    TAKE ONE TABLET BY MOUTH DAILY    BLOOD SUGAR DIAGNOSTIC STRP    1 strip by Misc.(Non-Drug; Combo Route) route 2 (two) times daily with meals.    BLOOD-GLUCOSE METER (ONETOUCH VERIO SYSTEM) MISC    1 Units by Misc.(Non-Drug; Combo Route) route once daily.    CLINDAMYCIN-TRETINOIN (ZIANA) GEL        INSULIN (LANTUS SOLOSTAR U-100 INSULIN) GLARGINE 100 UNITS/ML (3ML) SUBQ PEN    Inject 10 Units into the skin every evening.    LANCETS MISC    1 lancet by Misc.(Non-Drug; Combo Route) route 2 (two) times daily  "with meals.    LANCING DEVICE MISC    1 Device by Misc.(Non-Drug; Combo Route) route 2 (two) times daily with meals.    METFORMIN (GLUCOPHAGE) 500 MG TABLET    Take 1 tablet (500 mg total) by mouth 2 (two) times daily with meals.    PEN NEEDLE, DIABETIC (NOVOFINE PLUS) 32 GAUGE X 1/6" NDLE    1 pen by Misc.(Non-Drug; Combo Route) route 3 (three) times daily.    PRAVASTATIN (PRAVACHOL) 20 MG TABLET    TAKE ONE TABLET BY MOUTH DAILY    VALSARTAN-HYDROCHLOROTHIAZIDE (DIOVAN-HCT) 320-25 MG PER TABLET    Take 1 tablet by mouth once daily.       Assessment/Plans:-   45 y.o. pleasant male with PMH of DM, HTN, HLPD, Obesity, and SUPRIYA comes in for an initial visit for evaluation of diffused joint pain of over a decade.      PE: hypopigmented rash     Previous imaging: DJD in shoulder    Labs: Previous labs showed negative FAUSTINO and CCP (2015).     Diffused arthralgia - concern about PsA (hypopigmented rash of the hand and rash of the elbow and knees).  Other ddx includes RF, gout, vs OA (most likely)    Plan  - arthritis survey and bilateral shoulder  - RF/CCP  - ESR/CRP  - Uric acid  - recommendation for weight loss and daily exercising  - ibuprofen PRN - do not exceed daily limit  - recommendation for PT - patient deferred at this  - referral to dermatology for evaluation of rash            Follow up in about 5 weeks (around 1/15/2021).    Thank you for allowing me to participate in the care ofDeepak Tobias.    Disclaimer: This note was prepared using voice recognition system and is likely to have sound alike errors and is not proof read.  Please call me with any questions.              "

## 2020-12-11 ENCOUNTER — HOSPITAL ENCOUNTER (OUTPATIENT)
Dept: RADIOLOGY | Facility: HOSPITAL | Age: 45
Discharge: HOME | End: 2020-12-11
Attending: STUDENT IN AN ORGANIZED HEALTH CARE EDUCATION/TRAINING PROGRAM
Payer: COMMERCIAL

## 2020-12-11 ENCOUNTER — TELEPHONE (OUTPATIENT)
Dept: RHEUMATOLOGY | Facility: CLINIC | Age: 45
End: 2020-12-11

## 2020-12-11 ENCOUNTER — OFFICE VISIT (OUTPATIENT)
Dept: RHEUMATOLOGY | Facility: CLINIC | Age: 45
End: 2020-12-11
Payer: COMMERCIAL

## 2020-12-11 VITALS
SYSTOLIC BLOOD PRESSURE: 153 MMHG | HEART RATE: 60 BPM | BODY MASS INDEX: 44.89 KG/M2 | WEIGHT: 286 LBS | HEIGHT: 67 IN | DIASTOLIC BLOOD PRESSURE: 88 MMHG

## 2020-12-11 DIAGNOSIS — R21 RASH: ICD-10-CM

## 2020-12-11 DIAGNOSIS — E66.01 CLASS 3 SEVERE OBESITY WITH SERIOUS COMORBIDITY AND BODY MASS INDEX (BMI) OF 40.0 TO 44.9 IN ADULT, UNSPECIFIED OBESITY TYPE: ICD-10-CM

## 2020-12-11 DIAGNOSIS — M79.642 PAIN IN BOTH HANDS: ICD-10-CM

## 2020-12-11 DIAGNOSIS — I10 ESSENTIAL HYPERTENSION: Primary | ICD-10-CM

## 2020-12-11 DIAGNOSIS — M79.641 PAIN IN BOTH HANDS: ICD-10-CM

## 2020-12-11 DIAGNOSIS — I10 ESSENTIAL HYPERTENSION: ICD-10-CM

## 2020-12-11 DIAGNOSIS — M25.50 ARTHRALGIA, UNSPECIFIED JOINT: ICD-10-CM

## 2020-12-11 PROCEDURE — 99204 PR OFFICE/OUTPT VISIT, NEW, LEVL IV, 45-59 MIN: ICD-10-PCS | Mod: S$GLB,,, | Performed by: STUDENT IN AN ORGANIZED HEALTH CARE EDUCATION/TRAINING PROGRAM

## 2020-12-11 PROCEDURE — 3077F PR MOST RECENT SYSTOLIC BLOOD PRESSURE >= 140 MM HG: ICD-10-PCS | Mod: CPTII,S$GLB,, | Performed by: STUDENT IN AN ORGANIZED HEALTH CARE EDUCATION/TRAINING PROGRAM

## 2020-12-11 PROCEDURE — 3008F PR BODY MASS INDEX (BMI) DOCUMENTED: ICD-10-PCS | Mod: CPTII,S$GLB,, | Performed by: STUDENT IN AN ORGANIZED HEALTH CARE EDUCATION/TRAINING PROGRAM

## 2020-12-11 PROCEDURE — 1125F AMNT PAIN NOTED PAIN PRSNT: CPT | Mod: S$GLB,,, | Performed by: STUDENT IN AN ORGANIZED HEALTH CARE EDUCATION/TRAINING PROGRAM

## 2020-12-11 PROCEDURE — 73030 XR SHOULDER COMPLETE 2 OR MORE VIEWS BILATERAL: ICD-10-PCS | Mod: 26,,, | Performed by: RADIOLOGY

## 2020-12-11 PROCEDURE — 3079F DIAST BP 80-89 MM HG: CPT | Mod: CPTII,S$GLB,, | Performed by: STUDENT IN AN ORGANIZED HEALTH CARE EDUCATION/TRAINING PROGRAM

## 2020-12-11 PROCEDURE — 1125F PR PAIN SEVERITY QUANTIFIED, PAIN PRESENT: ICD-10-PCS | Mod: S$GLB,,, | Performed by: STUDENT IN AN ORGANIZED HEALTH CARE EDUCATION/TRAINING PROGRAM

## 2020-12-11 PROCEDURE — 77077 JOINT SURVEY SINGLE VIEW: CPT | Mod: 26,,, | Performed by: RADIOLOGY

## 2020-12-11 PROCEDURE — 73030 X-RAY EXAM OF SHOULDER: CPT | Mod: 26,,, | Performed by: RADIOLOGY

## 2020-12-11 PROCEDURE — 77077 JOINT SURVEY SINGLE VIEW: CPT | Mod: TC

## 2020-12-11 PROCEDURE — 3008F BODY MASS INDEX DOCD: CPT | Mod: CPTII,S$GLB,, | Performed by: STUDENT IN AN ORGANIZED HEALTH CARE EDUCATION/TRAINING PROGRAM

## 2020-12-11 PROCEDURE — 73030 X-RAY EXAM OF SHOULDER: CPT | Mod: TC,50

## 2020-12-11 PROCEDURE — 3079F PR MOST RECENT DIASTOLIC BLOOD PRESSURE 80-89 MM HG: ICD-10-PCS | Mod: CPTII,S$GLB,, | Performed by: STUDENT IN AN ORGANIZED HEALTH CARE EDUCATION/TRAINING PROGRAM

## 2020-12-11 PROCEDURE — 99204 OFFICE O/P NEW MOD 45 MIN: CPT | Mod: S$GLB,,, | Performed by: STUDENT IN AN ORGANIZED HEALTH CARE EDUCATION/TRAINING PROGRAM

## 2020-12-11 PROCEDURE — 99999 PR PBB SHADOW E&M-EST. PATIENT-LVL V: CPT | Mod: PBBFAC,,, | Performed by: STUDENT IN AN ORGANIZED HEALTH CARE EDUCATION/TRAINING PROGRAM

## 2020-12-11 PROCEDURE — 77077 XR ARTHRITIS SURVEY: ICD-10-PCS | Mod: 26,,, | Performed by: RADIOLOGY

## 2020-12-11 PROCEDURE — 99999 PR PBB SHADOW E&M-EST. PATIENT-LVL V: ICD-10-PCS | Mod: PBBFAC,,, | Performed by: STUDENT IN AN ORGANIZED HEALTH CARE EDUCATION/TRAINING PROGRAM

## 2020-12-11 PROCEDURE — 3077F SYST BP >= 140 MM HG: CPT | Mod: CPTII,S$GLB,, | Performed by: STUDENT IN AN ORGANIZED HEALTH CARE EDUCATION/TRAINING PROGRAM

## 2020-12-11 NOTE — TELEPHONE ENCOUNTER
----- Message from Ge Wilson sent at 12/11/2020  8:45 AM CST -----  Contact: Pt [phone]  Pt would like to know if he needs to fast.  He has an appt today for 2       Pt [phone]

## 2020-12-11 NOTE — PROGRESS NOTES
Pre chart    Answers for HPI/ROS submitted by the patient on 12/10/2020   fever: No  eye redness: No  mouth sores: No  headaches: No  shortness of breath: No  chest pain: No  trouble swallowing: No  diarrhea: No  constipation: No  unexpected weight change: No  genital sore: No  During the last 3 days, have you had a skin rash?: No  Bruises or bleeds easily: No  cough: No

## 2020-12-11 NOTE — LETTER
December 11, 2020      Edis Alexander Jr., MD  [address]  [facility] [address]           [id number]  [address]  [facility] [address]  Phone: [phone]  Fax: [phone]          Patient: Deepak Tobias   MR Number: [id number]   YOB: 1975   Date of Visit: 12/11/2020       Dear Dr. Edis Alexander Jr.:    Thank you for referring Deepak Tobias to me for evaluation. Attached you will find relevant portions of my assessment and plan of care.    If you have questions, please do not hesitate to call me. I look forward to following Deepak Tobias along with you.    Sincerely,    Kamille Wade MD    Enclosure  CC:  No Recipients    If you would like to receive this communication electronically, please contact [email or web address] or [phone] to request more information on [employer] access.    For providers and/or their staff who would like to refer a patient to [facility], please contact us through our one-stop-shop provider referral line, [facility], at [phone].    If you feel you have received this communication in error or would no longer like to receive these types of communications, please e-mail [email or web address]

## 2020-12-16 ENCOUNTER — OFFICE VISIT (OUTPATIENT)
Dept: DERMATOLOGY | Facility: CLINIC | Age: 45
End: 2020-12-16
Payer: COMMERCIAL

## 2020-12-16 DIAGNOSIS — L28.0 LICHEN SIMPLEX CHRONICUS: Primary | ICD-10-CM

## 2020-12-16 DIAGNOSIS — M79.642 PAIN IN BOTH HANDS: ICD-10-CM

## 2020-12-16 DIAGNOSIS — M25.50 ARTHRALGIA, UNSPECIFIED JOINT: ICD-10-CM

## 2020-12-16 DIAGNOSIS — E66.01 CLASS 3 SEVERE OBESITY WITH SERIOUS COMORBIDITY AND BODY MASS INDEX (BMI) OF 40.0 TO 44.9 IN ADULT, UNSPECIFIED OBESITY TYPE: ICD-10-CM

## 2020-12-16 DIAGNOSIS — L85.8 KERATOSIS PILARIS: ICD-10-CM

## 2020-12-16 DIAGNOSIS — I10 ESSENTIAL HYPERTENSION: ICD-10-CM

## 2020-12-16 DIAGNOSIS — M79.641 PAIN IN BOTH HANDS: ICD-10-CM

## 2020-12-16 DIAGNOSIS — R21 RASH: ICD-10-CM

## 2020-12-16 PROCEDURE — 99214 PR OFFICE/OUTPT VISIT, EST, LEVL IV, 30-39 MIN: ICD-10-PCS | Mod: S$GLB,,, | Performed by: DERMATOLOGY

## 2020-12-16 PROCEDURE — 99214 OFFICE O/P EST MOD 30 MIN: CPT | Mod: S$GLB,,, | Performed by: DERMATOLOGY

## 2020-12-16 PROCEDURE — 99999 PR PBB SHADOW E&M-EST. PATIENT-LVL II: ICD-10-PCS | Mod: PBBFAC,,,

## 2020-12-16 PROCEDURE — 99999 PR PBB SHADOW E&M-EST. PATIENT-LVL II: CPT | Mod: PBBFAC,,,

## 2020-12-16 RX ORDER — CLOBETASOL PROPIONATE 0.5 MG/G
CREAM TOPICAL
Qty: 30 G | Refills: 2 | Status: SHIPPED | OUTPATIENT
Start: 2020-12-16
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
## 2020-12-16 NOTE — PATIENT INSTRUCTIONS
Self-Care for Skin Rashes     Pat your skin dry. Do not rub.     When your skin reacts to a substance your body is sensitive to, it can cause a rash. You can treat most rashes at home by keeping the skin clean and dry. Many rashes may get better on their own within 2 to 3 days. You may need medical attention if your rash itches, drains, or hurts, particularly if the rash is getting worse.  What can cause a skin rash?  · Sun poisoning, caused by too much exposure to the sun  · An irritant or allergic reaction to a certain type of food, plant, or chemical, such as  shellfish, poison ivy, and or cleaning products  · An infection caused by a fungus (ringworm), virus (chickenpox), or bacteria (strep)  · Bites or infestation caused by insects or pests, such as ticks, lice, or mites  · Dry skin, which is often seen during the winter months and in older people  How can I control itching and skin damage?  · Take soothing lukewarm baths in a colloidal oatmeal product. You can buy this at the [employer].  · Do your best not to scratch. Clip fingernails short, especially in young children, to reduce skin damage if scratching does occur.  · Use moisturizing skin lotion instead of scratching your dry skin.  · Use sunscreen whenever going out into direct sun.  · Use only mild cleansing agents whenever possible.  · Wash with mild, nonirritating soap and warm water.  · Wear clothing that breathes, such as cotton shirts or canvas shoes.  · If fluid is seeping from the rash, cover it loosely with clean gauze to absorb the discharge.  · Many rashes are contagious. Prevent the rash from spreading to others by washing your hands often before or after touching others with any skin rash.  Use medicine  · Antihistamines such as diphenhydramine can help control itching. But use with caution because they can make you drowsy.  · Using over-the-counter hydrocortisone cream on small rashes may help reduce swelling and itching  · Most  over-the-counter antifungal medicines can treat athletes foot and many other fungal infections of the skin.  Check with your healthcare provider  Call your healthcare provider if:  · You were told that you have a fungal infection on your skin to make sure you have the correct type of medicine.  · You have questions or concerns about medicines or their side effects.     Call 911  Call 911 if either of these occur:  · Your tongue or lips start to swell  · You have difficulty breathing      Call your healthcare provider  Call your healthcare provider if any of these occur:  · Temperature of more than 101.0°F (38.3°C), or as directed  · Sore throat, a cough, or unusual fatigue  · Red, oozy, or painful rash gets worse. These are signs of infection.  · Rash covers your face, genitals, or most of your body  · Crusty sores or red rings that begin to spread  · You were exposed to someone who has a contagious rash, such as scabies or lice.  · Red bulls-eye rash with a white center (a sign of Lyme disease)  · You were told that you have resistant bacteria (MRSA) on your skin.   Date Last Reviewed: 5/12/2015  © 7745-0179 [employer]. [address] [place] [address], [place] [address]. All rights reserved. This information is not intended as a substitute for professional medical care. Always follow your healthcare professional's instructions.

## 2021-02-23 ENCOUNTER — PATIENT MESSAGE (OUTPATIENT)
Dept: RHEUMATOLOGY | Facility: CLINIC | Age: 46
End: 2021-02-23

## 2021-03-13 ENCOUNTER — IMMUNIZATION (OUTPATIENT)
Dept: PRIMARY CARE CLINIC | Facility: CLINIC | Age: 46
End: 2021-03-13
Payer: COMMERCIAL

## 2021-03-13 DIAGNOSIS — Z23 NEED FOR VACCINATION: Primary | ICD-10-CM

## 2021-03-13 PROCEDURE — 0001A PR IMMUNIZ ADMIN, SARS-COV-2 COVID-19 VACC, 30MCG/0.3ML, 1ST DOSE: CPT | Mod: CV19,S$GLB,, | Performed by: INTERNAL MEDICINE

## 2021-03-13 PROCEDURE — 91300 PR SARS-COV- 2 COVID-19 VACCINE, NO PRSV, 30MCG/0.3ML, IM: CPT | Mod: S$GLB,,, | Performed by: INTERNAL MEDICINE

## 2021-03-13 PROCEDURE — 91300 PR SARS-COV- 2 COVID-19 VACCINE, NO PRSV, 30MCG/0.3ML, IM: ICD-10-PCS | Mod: S$GLB,,, | Performed by: INTERNAL MEDICINE

## 2021-03-13 PROCEDURE — 0001A PR IMMUNIZ ADMIN, SARS-COV-2 COVID-19 VACC, 30MCG/0.3ML, 1ST DOSE: ICD-10-PCS | Mod: CV19,S$GLB,, | Performed by: INTERNAL MEDICINE

## 2021-03-13 RX ADMIN — Medication 0.3 ML: at 08:03

## 2021-03-15 ENCOUNTER — LAB VISIT (OUTPATIENT)
Dept: LAB | Facility: HOSPITAL | Age: 46
End: 2021-03-15
Attending: INTERNAL MEDICINE
Payer: COMMERCIAL

## 2021-03-15 DIAGNOSIS — E11.65 TYPE 2 DIABETES MELLITUS WITH HYPERGLYCEMIA, WITHOUT LONG-TERM CURRENT USE OF INSULIN: ICD-10-CM

## 2021-03-15 LAB
ALBUMIN SERPL BCP-MCNC: 3.8 G/DL (ref 3.5–5.2)
ALP SERPL-CCNC: 51 U/L (ref 55–135)
ALT SERPL W/O P-5'-P-CCNC: 26 U/L (ref 10–44)
ANION GAP SERPL CALC-SCNC: 9 MMOL/L (ref 8–16)
AST SERPL-CCNC: 18 U/L (ref 10–40)
BILIRUB SERPL-MCNC: 0.4 MG/DL (ref 0.1–1)
BUN SERPL-MCNC: 21 MG/DL (ref 6–20)
CALCIUM SERPL-MCNC: 9.8 MG/DL (ref 8.7–10.5)
CHLORIDE SERPL-SCNC: 104 MMOL/L (ref 95–110)
CO2 SERPL-SCNC: 29 MMOL/L (ref 23–29)
CREAT SERPL-MCNC: 1.3 MG/DL (ref 0.5–1.4)
EST. GFR  (AFRICAN AMERICAN): >60 ML/MIN/1.73 M^2
EST. GFR  (NON AFRICAN AMERICAN): >60 ML/MIN/1.73 M^2
ESTIMATED AVG GLUCOSE: 120 MG/DL (ref 68–131)
GLUCOSE SERPL-MCNC: 144 MG/DL (ref 70–110)
HBA1C MFR BLD: 5.8 % (ref 4–5.6)
POTASSIUM SERPL-SCNC: 3.9 MMOL/L (ref 3.5–5.1)
PROT SERPL-MCNC: 7.6 G/DL (ref 6–8.4)
SODIUM SERPL-SCNC: 142 MMOL/L (ref 136–145)

## 2021-03-15 PROCEDURE — 80053 COMPREHEN METABOLIC PANEL: CPT | Performed by: INTERNAL MEDICINE

## 2021-03-15 PROCEDURE — 83036 HEMOGLOBIN GLYCOSYLATED A1C: CPT | Performed by: INTERNAL MEDICINE

## 2021-03-15 PROCEDURE — 36415 COLL VENOUS BLD VENIPUNCTURE: CPT | Performed by: INTERNAL MEDICINE

## 2021-03-22 ENCOUNTER — OFFICE VISIT (OUTPATIENT)
Dept: INTERNAL MEDICINE | Facility: CLINIC | Age: 46
End: 2021-03-22
Payer: COMMERCIAL

## 2021-03-22 VITALS
SYSTOLIC BLOOD PRESSURE: 132 MMHG | WEIGHT: 287.94 LBS | OXYGEN SATURATION: 98 % | DIASTOLIC BLOOD PRESSURE: 88 MMHG | HEART RATE: 67 BPM | HEIGHT: 68 IN | BODY MASS INDEX: 43.64 KG/M2

## 2021-03-22 DIAGNOSIS — G47.30 SLEEP APNEA, UNSPECIFIED TYPE: ICD-10-CM

## 2021-03-22 DIAGNOSIS — E11.65 TYPE 2 DIABETES MELLITUS WITH HYPERGLYCEMIA, WITHOUT LONG-TERM CURRENT USE OF INSULIN: Primary | ICD-10-CM

## 2021-03-22 DIAGNOSIS — I10 ESSENTIAL HYPERTENSION: ICD-10-CM

## 2021-03-22 DIAGNOSIS — E66.01 CLASS 3 SEVERE OBESITY WITH SERIOUS COMORBIDITY AND BODY MASS INDEX (BMI) OF 40.0 TO 44.9 IN ADULT, UNSPECIFIED OBESITY TYPE: ICD-10-CM

## 2021-03-22 DIAGNOSIS — E78.5 HYPERLIPIDEMIA, UNSPECIFIED HYPERLIPIDEMIA TYPE: ICD-10-CM

## 2021-03-22 PROBLEM — J12.82 PNEUMONIA DUE TO COVID-19 VIRUS: Status: RESOLVED | Noted: 2020-04-01 | Resolved: 2021-03-22

## 2021-03-22 PROBLEM — U07.1 PNEUMONIA DUE TO COVID-19 VIRUS: Status: RESOLVED | Noted: 2020-04-01 | Resolved: 2021-03-22

## 2021-03-22 PROBLEM — J15.3 PNEUMONIA DUE TO GROUP B STREPTOCOCCUS: Status: RESOLVED | Noted: 2020-04-04 | Resolved: 2021-03-22

## 2021-03-22 PROCEDURE — 3079F PR MOST RECENT DIASTOLIC BLOOD PRESSURE 80-89 MM HG: ICD-10-PCS | Mod: CPTII,S$GLB,, | Performed by: INTERNAL MEDICINE

## 2021-03-22 PROCEDURE — 3072F PR LOW RISK FOR RETINOPATHY: ICD-10-PCS | Mod: S$GLB,,, | Performed by: INTERNAL MEDICINE

## 2021-03-22 PROCEDURE — 1125F AMNT PAIN NOTED PAIN PRSNT: CPT | Mod: S$GLB,,, | Performed by: INTERNAL MEDICINE

## 2021-03-22 PROCEDURE — 99214 OFFICE O/P EST MOD 30 MIN: CPT | Mod: S$GLB,,, | Performed by: INTERNAL MEDICINE

## 2021-03-22 PROCEDURE — 3075F PR MOST RECENT SYSTOLIC BLOOD PRESS GE 130-139MM HG: ICD-10-PCS | Mod: CPTII,S$GLB,, | Performed by: INTERNAL MEDICINE

## 2021-03-22 PROCEDURE — 99999 PR PBB SHADOW E&M-EST. PATIENT-LVL III: CPT | Mod: PBBFAC,,, | Performed by: INTERNAL MEDICINE

## 2021-03-22 PROCEDURE — 3008F BODY MASS INDEX DOCD: CPT | Mod: CPTII,S$GLB,, | Performed by: INTERNAL MEDICINE

## 2021-03-22 PROCEDURE — 1125F PR PAIN SEVERITY QUANTIFIED, PAIN PRESENT: ICD-10-PCS | Mod: S$GLB,,, | Performed by: INTERNAL MEDICINE

## 2021-03-22 PROCEDURE — 3079F DIAST BP 80-89 MM HG: CPT | Mod: CPTII,S$GLB,, | Performed by: INTERNAL MEDICINE

## 2021-03-22 PROCEDURE — 99999 PR PBB SHADOW E&M-EST. PATIENT-LVL III: ICD-10-PCS | Mod: PBBFAC,,, | Performed by: INTERNAL MEDICINE

## 2021-03-22 PROCEDURE — 3008F PR BODY MASS INDEX (BMI) DOCUMENTED: ICD-10-PCS | Mod: CPTII,S$GLB,, | Performed by: INTERNAL MEDICINE

## 2021-03-22 PROCEDURE — 3044F HG A1C LEVEL LT 7.0%: CPT | Mod: CPTII,S$GLB,, | Performed by: INTERNAL MEDICINE

## 2021-03-22 PROCEDURE — 3044F PR MOST RECENT HEMOGLOBIN A1C LEVEL <7.0%: ICD-10-PCS | Mod: CPTII,S$GLB,, | Performed by: INTERNAL MEDICINE

## 2021-03-22 PROCEDURE — 3075F SYST BP GE 130 - 139MM HG: CPT | Mod: CPTII,S$GLB,, | Performed by: INTERNAL MEDICINE

## 2021-03-22 PROCEDURE — 99214 PR OFFICE/OUTPT VISIT, EST, LEVL IV, 30-39 MIN: ICD-10-PCS | Mod: S$GLB,,, | Performed by: INTERNAL MEDICINE

## 2021-03-22 PROCEDURE — 3072F LOW RISK FOR RETINOPATHY: CPT | Mod: S$GLB,,, | Performed by: INTERNAL MEDICINE

## 2021-04-03 ENCOUNTER — IMMUNIZATION (OUTPATIENT)
Dept: PRIMARY CARE CLINIC | Facility: CLINIC | Age: 46
End: 2021-04-03
Payer: COMMERCIAL

## 2021-04-03 DIAGNOSIS — Z23 NEED FOR VACCINATION: Primary | ICD-10-CM

## 2021-04-03 PROCEDURE — 0002A PR IMMUNIZ ADMIN, SARS-COV-2 COVID-19 VACC, 30MCG/0.3ML, 2ND DOSE: CPT | Mod: CV19,S$GLB,, | Performed by: INTERNAL MEDICINE

## 2021-04-03 PROCEDURE — 91300 PR SARS-COV- 2 COVID-19 VACCINE, NO PRSV, 30MCG/0.3ML, IM: ICD-10-PCS | Mod: S$GLB,,, | Performed by: INTERNAL MEDICINE

## 2021-04-03 PROCEDURE — 91300 PR SARS-COV- 2 COVID-19 VACCINE, NO PRSV, 30MCG/0.3ML, IM: CPT | Mod: S$GLB,,, | Performed by: INTERNAL MEDICINE

## 2021-04-03 PROCEDURE — 0002A PR IMMUNIZ ADMIN, SARS-COV-2 COVID-19 VACC, 30MCG/0.3ML, 2ND DOSE: ICD-10-PCS | Mod: CV19,S$GLB,, | Performed by: INTERNAL MEDICINE

## 2021-04-03 RX ADMIN — Medication 0.3 ML: at 08:04

## 2021-04-09 ENCOUNTER — PATIENT MESSAGE (OUTPATIENT)
Dept: INTERNAL MEDICINE | Facility: CLINIC | Age: 46
End: 2021-04-09

## 2021-04-09 ENCOUNTER — TELEPHONE (OUTPATIENT)
Dept: INTERNAL MEDICINE | Facility: CLINIC | Age: 46
End: 2021-04-09

## 2021-04-12 ENCOUNTER — PATIENT MESSAGE (OUTPATIENT)
Dept: INTERNAL MEDICINE | Facility: CLINIC | Age: 46
End: 2021-04-12

## 2021-04-12 RX ORDER — METFORMIN HYDROCHLORIDE 500 MG/1
500 TABLET ORAL 2 TIMES DAILY WITH MEALS
Qty: 180 TABLET | Refills: 3 | Status: SHIPPED | OUTPATIENT
Start: 2021-04-12 | End: 2022-04-12 | Stop reason: SDUPTHER

## 2021-04-13 RX ORDER — LANCING DEVICE
1 EACH MISCELLANEOUS 2 TIMES DAILY WITH MEALS
Qty: 1 EACH | Refills: 0 | Status: SHIPPED | OUTPATIENT
Start: 2021-04-13 | End: 2023-11-28

## 2021-04-13 RX ORDER — PEN NEEDLE, DIABETIC 32 GX 1/6"
1 NEEDLE, DISPOSABLE MISCELLANEOUS 3 TIMES DAILY
Qty: 100 EACH | Refills: 15 | Status: SHIPPED | OUTPATIENT
Start: 2021-04-13 | End: 2022-06-13 | Stop reason: SDUPTHER

## 2021-04-13 RX ORDER — INSULIN GLARGINE 100 [IU]/ML
10 INJECTION, SOLUTION SUBCUTANEOUS NIGHTLY
Qty: 1 BOX | Refills: 15 | Status: SHIPPED | OUTPATIENT
Start: 2021-04-13 | End: 2022-06-17

## 2021-04-13 RX ORDER — LANCETS
1 EACH MISCELLANEOUS
Qty: 300 EACH | Refills: 11 | Status: SHIPPED | OUTPATIENT
Start: 2021-04-13

## 2021-05-07 DIAGNOSIS — E11.9 TYPE 2 DIABETES MELLITUS WITHOUT COMPLICATION: ICD-10-CM

## 2021-07-06 ENCOUNTER — PATIENT MESSAGE (OUTPATIENT)
Dept: ADMINISTRATIVE | Facility: HOSPITAL | Age: 46
End: 2021-07-06

## 2021-10-04 ENCOUNTER — PATIENT MESSAGE (OUTPATIENT)
Dept: ADMINISTRATIVE | Facility: HOSPITAL | Age: 46
End: 2021-10-04

## 2021-10-05 ENCOUNTER — PATIENT MESSAGE (OUTPATIENT)
Dept: SPORTS MEDICINE | Facility: CLINIC | Age: 46
End: 2021-10-05

## 2021-10-05 DIAGNOSIS — M25.562 LEFT KNEE PAIN, UNSPECIFIED CHRONICITY: Primary | ICD-10-CM

## 2021-10-06 ENCOUNTER — HOSPITAL ENCOUNTER (OUTPATIENT)
Dept: RADIOLOGY | Facility: HOSPITAL | Age: 46
Discharge: HOME | End: 2021-10-06
Attending: STUDENT IN AN ORGANIZED HEALTH CARE EDUCATION/TRAINING PROGRAM
Payer: COMMERCIAL

## 2021-10-06 ENCOUNTER — OFFICE VISIT (OUTPATIENT)
Dept: SPORTS MEDICINE | Facility: CLINIC | Age: 46
End: 2021-10-06
Payer: COMMERCIAL

## 2021-10-06 VITALS
HEIGHT: 68 IN | DIASTOLIC BLOOD PRESSURE: 93 MMHG | SYSTOLIC BLOOD PRESSURE: 155 MMHG | WEIGHT: 271.81 LBS | BODY MASS INDEX: 41.19 KG/M2

## 2021-10-06 DIAGNOSIS — M23.52 CHRONIC INSTABILITY OF LEFT KNEE: Primary | ICD-10-CM

## 2021-10-06 DIAGNOSIS — G89.29 CHRONIC PAIN OF LEFT KNEE: ICD-10-CM

## 2021-10-06 DIAGNOSIS — M25.562 LEFT KNEE PAIN, UNSPECIFIED CHRONICITY: ICD-10-CM

## 2021-10-06 DIAGNOSIS — E66.01 CLASS 3 SEVERE OBESITY WITH BODY MASS INDEX (BMI) OF 40.0 TO 44.9 IN ADULT, UNSPECIFIED OBESITY TYPE, UNSPECIFIED WHETHER SERIOUS COMORBIDITY PRESENT: ICD-10-CM

## 2021-10-06 DIAGNOSIS — M25.562 CHRONIC PAIN OF LEFT KNEE: ICD-10-CM

## 2021-10-06 DIAGNOSIS — M17.12 PRIMARY OSTEOARTHRITIS OF LEFT KNEE: ICD-10-CM

## 2021-10-06 DIAGNOSIS — R03.0 ELEVATED BLOOD PRESSURE READING: ICD-10-CM

## 2021-10-06 PROCEDURE — 3080F DIAST BP >= 90 MM HG: CPT | Mod: CPTII,S$GLB,, | Performed by: STUDENT IN AN ORGANIZED HEALTH CARE EDUCATION/TRAINING PROGRAM

## 2021-10-06 PROCEDURE — 1160F PR REVIEW ALL MEDS BY PRESCRIBER/CLIN PHARMACIST DOCUMENTED: ICD-10-PCS | Mod: CPTII,S$GLB,, | Performed by: STUDENT IN AN ORGANIZED HEALTH CARE EDUCATION/TRAINING PROGRAM

## 2021-10-06 PROCEDURE — 73562 X-RAY EXAM OF KNEE 3: CPT | Mod: 26,RT,, | Performed by: RADIOLOGY

## 2021-10-06 PROCEDURE — 73562 XR KNEE ORTHO LEFT WITH FLEXION: ICD-10-PCS | Mod: 26,RT,, | Performed by: RADIOLOGY

## 2021-10-06 PROCEDURE — 99999 PR PBB SHADOW E&M-EST. PATIENT-LVL III: ICD-10-PCS | Mod: PBBFAC,,, | Performed by: STUDENT IN AN ORGANIZED HEALTH CARE EDUCATION/TRAINING PROGRAM

## 2021-10-06 PROCEDURE — 1160F RVW MEDS BY RX/DR IN RCRD: CPT | Mod: CPTII,S$GLB,, | Performed by: STUDENT IN AN ORGANIZED HEALTH CARE EDUCATION/TRAINING PROGRAM

## 2021-10-06 PROCEDURE — 3077F SYST BP >= 140 MM HG: CPT | Mod: CPTII,S$GLB,, | Performed by: STUDENT IN AN ORGANIZED HEALTH CARE EDUCATION/TRAINING PROGRAM

## 2021-10-06 PROCEDURE — 97110 PR THERAPEUTIC EXERCISES: ICD-10-PCS | Mod: S$GLB,,, | Performed by: STUDENT IN AN ORGANIZED HEALTH CARE EDUCATION/TRAINING PROGRAM

## 2021-10-06 PROCEDURE — 3008F BODY MASS INDEX DOCD: CPT | Mod: CPTII,S$GLB,, | Performed by: STUDENT IN AN ORGANIZED HEALTH CARE EDUCATION/TRAINING PROGRAM

## 2021-10-06 PROCEDURE — 97110 THERAPEUTIC EXERCISES: CPT | Mod: S$GLB,,, | Performed by: STUDENT IN AN ORGANIZED HEALTH CARE EDUCATION/TRAINING PROGRAM

## 2021-10-06 PROCEDURE — 99214 PR OFFICE/OUTPT VISIT, EST, LEVL IV, 30-39 MIN: ICD-10-PCS | Mod: S$GLB,,, | Performed by: STUDENT IN AN ORGANIZED HEALTH CARE EDUCATION/TRAINING PROGRAM

## 2021-10-06 PROCEDURE — 99999 PR PBB SHADOW E&M-EST. PATIENT-LVL III: CPT | Mod: PBBFAC,,, | Performed by: STUDENT IN AN ORGANIZED HEALTH CARE EDUCATION/TRAINING PROGRAM

## 2021-10-06 PROCEDURE — 3008F PR BODY MASS INDEX (BMI) DOCUMENTED: ICD-10-PCS | Mod: CPTII,S$GLB,, | Performed by: STUDENT IN AN ORGANIZED HEALTH CARE EDUCATION/TRAINING PROGRAM

## 2021-10-06 PROCEDURE — 73564 X-RAY EXAM KNEE 4 OR MORE: CPT | Mod: 26,LT,, | Performed by: RADIOLOGY

## 2021-10-06 PROCEDURE — 3077F PR MOST RECENT SYSTOLIC BLOOD PRESSURE >= 140 MM HG: ICD-10-PCS | Mod: CPTII,S$GLB,, | Performed by: STUDENT IN AN ORGANIZED HEALTH CARE EDUCATION/TRAINING PROGRAM

## 2021-10-06 PROCEDURE — 1159F MED LIST DOCD IN RCRD: CPT | Mod: CPTII,S$GLB,, | Performed by: STUDENT IN AN ORGANIZED HEALTH CARE EDUCATION/TRAINING PROGRAM

## 2021-10-06 PROCEDURE — 73564 X-RAY EXAM KNEE 4 OR MORE: CPT | Mod: TC,PN,LT

## 2021-10-06 PROCEDURE — 3072F LOW RISK FOR RETINOPATHY: CPT | Mod: CPTII,S$GLB,, | Performed by: STUDENT IN AN ORGANIZED HEALTH CARE EDUCATION/TRAINING PROGRAM

## 2021-10-06 PROCEDURE — 99214 OFFICE O/P EST MOD 30 MIN: CPT | Mod: S$GLB,,, | Performed by: STUDENT IN AN ORGANIZED HEALTH CARE EDUCATION/TRAINING PROGRAM

## 2021-10-06 PROCEDURE — 3072F PR LOW RISK FOR RETINOPATHY: ICD-10-PCS | Mod: CPTII,S$GLB,, | Performed by: STUDENT IN AN ORGANIZED HEALTH CARE EDUCATION/TRAINING PROGRAM

## 2021-10-06 PROCEDURE — 73564 XR KNEE ORTHO LEFT WITH FLEXION: ICD-10-PCS | Mod: 26,LT,, | Performed by: RADIOLOGY

## 2021-10-06 PROCEDURE — 3044F PR MOST RECENT HEMOGLOBIN A1C LEVEL <7.0%: ICD-10-PCS | Mod: CPTII,S$GLB,, | Performed by: STUDENT IN AN ORGANIZED HEALTH CARE EDUCATION/TRAINING PROGRAM

## 2021-10-06 PROCEDURE — 1159F PR MEDICATION LIST DOCUMENTED IN MEDICAL RECORD: ICD-10-PCS | Mod: CPTII,S$GLB,, | Performed by: STUDENT IN AN ORGANIZED HEALTH CARE EDUCATION/TRAINING PROGRAM

## 2021-10-06 PROCEDURE — 1125F AMNT PAIN NOTED PAIN PRSNT: CPT | Mod: CPTII,S$GLB,, | Performed by: STUDENT IN AN ORGANIZED HEALTH CARE EDUCATION/TRAINING PROGRAM

## 2021-10-06 PROCEDURE — 1125F PR PAIN SEVERITY QUANTIFIED, PAIN PRESENT: ICD-10-PCS | Mod: CPTII,S$GLB,, | Performed by: STUDENT IN AN ORGANIZED HEALTH CARE EDUCATION/TRAINING PROGRAM

## 2021-10-06 PROCEDURE — 3044F HG A1C LEVEL LT 7.0%: CPT | Mod: CPTII,S$GLB,, | Performed by: STUDENT IN AN ORGANIZED HEALTH CARE EDUCATION/TRAINING PROGRAM

## 2021-10-06 PROCEDURE — 3080F PR MOST RECENT DIASTOLIC BLOOD PRESSURE >= 90 MM HG: ICD-10-PCS | Mod: CPTII,S$GLB,, | Performed by: STUDENT IN AN ORGANIZED HEALTH CARE EDUCATION/TRAINING PROGRAM

## 2021-10-14 ENCOUNTER — HOSPITAL ENCOUNTER (OUTPATIENT)
Dept: RADIOLOGY | Facility: HOSPITAL | Age: 46
Discharge: HOME | End: 2021-10-14
Attending: STUDENT IN AN ORGANIZED HEALTH CARE EDUCATION/TRAINING PROGRAM
Payer: COMMERCIAL

## 2021-10-14 DIAGNOSIS — M23.52 CHRONIC INSTABILITY OF LEFT KNEE: ICD-10-CM

## 2021-10-14 PROCEDURE — 73721 MRI JNT OF LWR EXTRE W/O DYE: CPT | Mod: 26,LT,, | Performed by: RADIOLOGY

## 2021-10-14 PROCEDURE — 73721 MRI JNT OF LWR EXTRE W/O DYE: CPT | Mod: TC,LT

## 2021-10-14 PROCEDURE — 73721 MRI KNEE WITHOUT CONTRAST LEFT: ICD-10-PCS | Mod: 26,LT,, | Performed by: RADIOLOGY

## 2021-10-19 ENCOUNTER — OFFICE VISIT (OUTPATIENT)
Dept: SPORTS MEDICINE | Facility: CLINIC | Age: 46
End: 2021-10-19
Payer: COMMERCIAL

## 2021-10-19 VITALS
WEIGHT: 271.81 LBS | BODY MASS INDEX: 41.19 KG/M2 | DIASTOLIC BLOOD PRESSURE: 91 MMHG | SYSTOLIC BLOOD PRESSURE: 155 MMHG | HEIGHT: 68 IN

## 2021-10-19 DIAGNOSIS — M25.562 CHRONIC PAIN OF LEFT KNEE: ICD-10-CM

## 2021-10-19 DIAGNOSIS — E66.01 CLASS 3 SEVERE OBESITY WITH BODY MASS INDEX (BMI) OF 40.0 TO 44.9 IN ADULT, UNSPECIFIED OBESITY TYPE, UNSPECIFIED WHETHER SERIOUS COMORBIDITY PRESENT: ICD-10-CM

## 2021-10-19 DIAGNOSIS — M23.52 CHRONIC INSTABILITY OF LEFT KNEE: ICD-10-CM

## 2021-10-19 DIAGNOSIS — M17.12 PRIMARY OSTEOARTHRITIS OF LEFT KNEE: Primary | ICD-10-CM

## 2021-10-19 DIAGNOSIS — G89.29 CHRONIC PAIN OF LEFT KNEE: ICD-10-CM

## 2021-10-19 PROCEDURE — 3072F PR LOW RISK FOR RETINOPATHY: ICD-10-PCS | Mod: CPTII,S$GLB,, | Performed by: STUDENT IN AN ORGANIZED HEALTH CARE EDUCATION/TRAINING PROGRAM

## 2021-10-19 PROCEDURE — 3008F BODY MASS INDEX DOCD: CPT | Mod: CPTII,S$GLB,, | Performed by: STUDENT IN AN ORGANIZED HEALTH CARE EDUCATION/TRAINING PROGRAM

## 2021-10-19 PROCEDURE — 3008F PR BODY MASS INDEX (BMI) DOCUMENTED: ICD-10-PCS | Mod: CPTII,S$GLB,, | Performed by: STUDENT IN AN ORGANIZED HEALTH CARE EDUCATION/TRAINING PROGRAM

## 2021-10-19 PROCEDURE — 99999 PR PBB SHADOW E&M-EST. PATIENT-LVL III: CPT | Mod: PBBFAC,,, | Performed by: STUDENT IN AN ORGANIZED HEALTH CARE EDUCATION/TRAINING PROGRAM

## 2021-10-19 PROCEDURE — 3080F DIAST BP >= 90 MM HG: CPT | Mod: CPTII,S$GLB,, | Performed by: STUDENT IN AN ORGANIZED HEALTH CARE EDUCATION/TRAINING PROGRAM

## 2021-10-19 PROCEDURE — 1125F PR PAIN SEVERITY QUANTIFIED, PAIN PRESENT: ICD-10-PCS | Mod: CPTII,S$GLB,, | Performed by: STUDENT IN AN ORGANIZED HEALTH CARE EDUCATION/TRAINING PROGRAM

## 2021-10-19 PROCEDURE — 97110 PR THERAPEUTIC EXERCISES: ICD-10-PCS | Mod: GP,S$GLB,, | Performed by: STUDENT IN AN ORGANIZED HEALTH CARE EDUCATION/TRAINING PROGRAM

## 2021-10-19 PROCEDURE — 3077F SYST BP >= 140 MM HG: CPT | Mod: CPTII,S$GLB,, | Performed by: STUDENT IN AN ORGANIZED HEALTH CARE EDUCATION/TRAINING PROGRAM

## 2021-10-19 PROCEDURE — 3080F PR MOST RECENT DIASTOLIC BLOOD PRESSURE >= 90 MM HG: ICD-10-PCS | Mod: CPTII,S$GLB,, | Performed by: STUDENT IN AN ORGANIZED HEALTH CARE EDUCATION/TRAINING PROGRAM

## 2021-10-19 PROCEDURE — 97110 THERAPEUTIC EXERCISES: CPT | Mod: GP,S$GLB,, | Performed by: STUDENT IN AN ORGANIZED HEALTH CARE EDUCATION/TRAINING PROGRAM

## 2021-10-19 PROCEDURE — 1159F PR MEDICATION LIST DOCUMENTED IN MEDICAL RECORD: ICD-10-PCS | Mod: CPTII,S$GLB,, | Performed by: STUDENT IN AN ORGANIZED HEALTH CARE EDUCATION/TRAINING PROGRAM

## 2021-10-19 PROCEDURE — 3044F HG A1C LEVEL LT 7.0%: CPT | Mod: CPTII,S$GLB,, | Performed by: STUDENT IN AN ORGANIZED HEALTH CARE EDUCATION/TRAINING PROGRAM

## 2021-10-19 PROCEDURE — 3077F PR MOST RECENT SYSTOLIC BLOOD PRESSURE >= 140 MM HG: ICD-10-PCS | Mod: CPTII,S$GLB,, | Performed by: STUDENT IN AN ORGANIZED HEALTH CARE EDUCATION/TRAINING PROGRAM

## 2021-10-19 PROCEDURE — 1125F AMNT PAIN NOTED PAIN PRSNT: CPT | Mod: CPTII,S$GLB,, | Performed by: STUDENT IN AN ORGANIZED HEALTH CARE EDUCATION/TRAINING PROGRAM

## 2021-10-19 PROCEDURE — 3072F LOW RISK FOR RETINOPATHY: CPT | Mod: CPTII,S$GLB,, | Performed by: STUDENT IN AN ORGANIZED HEALTH CARE EDUCATION/TRAINING PROGRAM

## 2021-10-19 PROCEDURE — 1160F RVW MEDS BY RX/DR IN RCRD: CPT | Mod: CPTII,S$GLB,, | Performed by: STUDENT IN AN ORGANIZED HEALTH CARE EDUCATION/TRAINING PROGRAM

## 2021-10-19 PROCEDURE — 99999 PR PBB SHADOW E&M-EST. PATIENT-LVL III: ICD-10-PCS | Mod: PBBFAC,,, | Performed by: STUDENT IN AN ORGANIZED HEALTH CARE EDUCATION/TRAINING PROGRAM

## 2021-10-19 PROCEDURE — 99214 PR OFFICE/OUTPT VISIT, EST, LEVL IV, 30-39 MIN: ICD-10-PCS | Mod: S$GLB,,, | Performed by: STUDENT IN AN ORGANIZED HEALTH CARE EDUCATION/TRAINING PROGRAM

## 2021-10-19 PROCEDURE — 1160F PR REVIEW ALL MEDS BY PRESCRIBER/CLIN PHARMACIST DOCUMENTED: ICD-10-PCS | Mod: CPTII,S$GLB,, | Performed by: STUDENT IN AN ORGANIZED HEALTH CARE EDUCATION/TRAINING PROGRAM

## 2021-10-19 PROCEDURE — 3044F PR MOST RECENT HEMOGLOBIN A1C LEVEL <7.0%: ICD-10-PCS | Mod: CPTII,S$GLB,, | Performed by: STUDENT IN AN ORGANIZED HEALTH CARE EDUCATION/TRAINING PROGRAM

## 2021-10-19 PROCEDURE — 99214 OFFICE O/P EST MOD 30 MIN: CPT | Mod: S$GLB,,, | Performed by: STUDENT IN AN ORGANIZED HEALTH CARE EDUCATION/TRAINING PROGRAM

## 2021-10-19 PROCEDURE — 1159F MED LIST DOCD IN RCRD: CPT | Mod: CPTII,S$GLB,, | Performed by: STUDENT IN AN ORGANIZED HEALTH CARE EDUCATION/TRAINING PROGRAM

## 2021-11-02 ENCOUNTER — OFFICE VISIT (OUTPATIENT)
Dept: SPORTS MEDICINE | Facility: CLINIC | Age: 46
End: 2021-11-02
Payer: COMMERCIAL

## 2021-11-02 VITALS
BODY MASS INDEX: 41.19 KG/M2 | DIASTOLIC BLOOD PRESSURE: 88 MMHG | SYSTOLIC BLOOD PRESSURE: 130 MMHG | HEIGHT: 68 IN | WEIGHT: 271.81 LBS

## 2021-11-02 DIAGNOSIS — M17.12 PRIMARY OSTEOARTHRITIS OF LEFT KNEE: Primary | ICD-10-CM

## 2021-11-02 DIAGNOSIS — M25.562 CHRONIC PAIN OF LEFT KNEE: ICD-10-CM

## 2021-11-02 DIAGNOSIS — M23.52 CHRONIC INSTABILITY OF LEFT KNEE: ICD-10-CM

## 2021-11-02 DIAGNOSIS — G89.29 CHRONIC PAIN OF LEFT KNEE: ICD-10-CM

## 2021-11-02 DIAGNOSIS — E66.01 CLASS 3 SEVERE OBESITY WITH BODY MASS INDEX (BMI) OF 40.0 TO 44.9 IN ADULT, UNSPECIFIED OBESITY TYPE, UNSPECIFIED WHETHER SERIOUS COMORBIDITY PRESENT: ICD-10-CM

## 2021-11-02 PROCEDURE — 3072F PR LOW RISK FOR RETINOPATHY: ICD-10-PCS | Mod: CPTII,S$GLB,, | Performed by: STUDENT IN AN ORGANIZED HEALTH CARE EDUCATION/TRAINING PROGRAM

## 2021-11-02 PROCEDURE — 1159F MED LIST DOCD IN RCRD: CPT | Mod: CPTII,S$GLB,, | Performed by: STUDENT IN AN ORGANIZED HEALTH CARE EDUCATION/TRAINING PROGRAM

## 2021-11-02 PROCEDURE — 99999 PR PBB SHADOW E&M-EST. PATIENT-LVL III: CPT | Mod: PBBFAC,,, | Performed by: STUDENT IN AN ORGANIZED HEALTH CARE EDUCATION/TRAINING PROGRAM

## 2021-11-02 PROCEDURE — 3075F PR MOST RECENT SYSTOLIC BLOOD PRESS GE 130-139MM HG: ICD-10-PCS | Mod: CPTII,S$GLB,, | Performed by: STUDENT IN AN ORGANIZED HEALTH CARE EDUCATION/TRAINING PROGRAM

## 2021-11-02 PROCEDURE — 20611 LARGE JOINT ASPIRATION/INJECTION: L KNEE: ICD-10-PCS | Mod: LT,S$GLB,, | Performed by: STUDENT IN AN ORGANIZED HEALTH CARE EDUCATION/TRAINING PROGRAM

## 2021-11-02 PROCEDURE — 99499 UNLISTED E&M SERVICE: CPT | Mod: S$GLB,,, | Performed by: STUDENT IN AN ORGANIZED HEALTH CARE EDUCATION/TRAINING PROGRAM

## 2021-11-02 PROCEDURE — 1160F RVW MEDS BY RX/DR IN RCRD: CPT | Mod: CPTII,S$GLB,, | Performed by: STUDENT IN AN ORGANIZED HEALTH CARE EDUCATION/TRAINING PROGRAM

## 2021-11-02 PROCEDURE — 3008F PR BODY MASS INDEX (BMI) DOCUMENTED: ICD-10-PCS | Mod: CPTII,S$GLB,, | Performed by: STUDENT IN AN ORGANIZED HEALTH CARE EDUCATION/TRAINING PROGRAM

## 2021-11-02 PROCEDURE — 1125F PR PAIN SEVERITY QUANTIFIED, PAIN PRESENT: ICD-10-PCS | Mod: CPTII,S$GLB,, | Performed by: STUDENT IN AN ORGANIZED HEALTH CARE EDUCATION/TRAINING PROGRAM

## 2021-11-02 PROCEDURE — 3079F DIAST BP 80-89 MM HG: CPT | Mod: CPTII,S$GLB,, | Performed by: STUDENT IN AN ORGANIZED HEALTH CARE EDUCATION/TRAINING PROGRAM

## 2021-11-02 PROCEDURE — 3079F PR MOST RECENT DIASTOLIC BLOOD PRESSURE 80-89 MM HG: ICD-10-PCS | Mod: CPTII,S$GLB,, | Performed by: STUDENT IN AN ORGANIZED HEALTH CARE EDUCATION/TRAINING PROGRAM

## 2021-11-02 PROCEDURE — 3008F BODY MASS INDEX DOCD: CPT | Mod: CPTII,S$GLB,, | Performed by: STUDENT IN AN ORGANIZED HEALTH CARE EDUCATION/TRAINING PROGRAM

## 2021-11-02 PROCEDURE — 3075F SYST BP GE 130 - 139MM HG: CPT | Mod: CPTII,S$GLB,, | Performed by: STUDENT IN AN ORGANIZED HEALTH CARE EDUCATION/TRAINING PROGRAM

## 2021-11-02 PROCEDURE — 99499 NO LOS: ICD-10-PCS | Mod: S$GLB,,, | Performed by: STUDENT IN AN ORGANIZED HEALTH CARE EDUCATION/TRAINING PROGRAM

## 2021-11-02 PROCEDURE — 1160F PR REVIEW ALL MEDS BY PRESCRIBER/CLIN PHARMACIST DOCUMENTED: ICD-10-PCS | Mod: CPTII,S$GLB,, | Performed by: STUDENT IN AN ORGANIZED HEALTH CARE EDUCATION/TRAINING PROGRAM

## 2021-11-02 PROCEDURE — 3044F HG A1C LEVEL LT 7.0%: CPT | Mod: CPTII,S$GLB,, | Performed by: STUDENT IN AN ORGANIZED HEALTH CARE EDUCATION/TRAINING PROGRAM

## 2021-11-02 PROCEDURE — 20611 DRAIN/INJ JOINT/BURSA W/US: CPT | Mod: LT,S$GLB,, | Performed by: STUDENT IN AN ORGANIZED HEALTH CARE EDUCATION/TRAINING PROGRAM

## 2021-11-02 PROCEDURE — 99999 PR PBB SHADOW E&M-EST. PATIENT-LVL III: ICD-10-PCS | Mod: PBBFAC,,, | Performed by: STUDENT IN AN ORGANIZED HEALTH CARE EDUCATION/TRAINING PROGRAM

## 2021-11-02 PROCEDURE — 1159F PR MEDICATION LIST DOCUMENTED IN MEDICAL RECORD: ICD-10-PCS | Mod: CPTII,S$GLB,, | Performed by: STUDENT IN AN ORGANIZED HEALTH CARE EDUCATION/TRAINING PROGRAM

## 2021-11-02 PROCEDURE — 1125F AMNT PAIN NOTED PAIN PRSNT: CPT | Mod: CPTII,S$GLB,, | Performed by: STUDENT IN AN ORGANIZED HEALTH CARE EDUCATION/TRAINING PROGRAM

## 2021-11-02 PROCEDURE — 3044F PR MOST RECENT HEMOGLOBIN A1C LEVEL <7.0%: ICD-10-PCS | Mod: CPTII,S$GLB,, | Performed by: STUDENT IN AN ORGANIZED HEALTH CARE EDUCATION/TRAINING PROGRAM

## 2021-11-02 PROCEDURE — 3072F LOW RISK FOR RETINOPATHY: CPT | Mod: CPTII,S$GLB,, | Performed by: STUDENT IN AN ORGANIZED HEALTH CARE EDUCATION/TRAINING PROGRAM

## 2021-11-09 ENCOUNTER — OFFICE VISIT (OUTPATIENT)
Dept: SPORTS MEDICINE | Facility: CLINIC | Age: 46
End: 2021-11-09
Payer: COMMERCIAL

## 2021-11-09 VITALS
BODY MASS INDEX: 41.19 KG/M2 | DIASTOLIC BLOOD PRESSURE: 96 MMHG | SYSTOLIC BLOOD PRESSURE: 165 MMHG | HEIGHT: 68 IN | WEIGHT: 271.81 LBS

## 2021-11-09 DIAGNOSIS — M17.12 PRIMARY OSTEOARTHRITIS OF LEFT KNEE: Primary | ICD-10-CM

## 2021-11-09 PROCEDURE — 99999 PR PBB SHADOW E&M-EST. PATIENT-LVL III: CPT | Mod: PBBFAC,,, | Performed by: STUDENT IN AN ORGANIZED HEALTH CARE EDUCATION/TRAINING PROGRAM

## 2021-11-09 PROCEDURE — 3080F DIAST BP >= 90 MM HG: CPT | Mod: CPTII,S$GLB,, | Performed by: STUDENT IN AN ORGANIZED HEALTH CARE EDUCATION/TRAINING PROGRAM

## 2021-11-09 PROCEDURE — 3077F PR MOST RECENT SYSTOLIC BLOOD PRESSURE >= 140 MM HG: ICD-10-PCS | Mod: CPTII,S$GLB,, | Performed by: STUDENT IN AN ORGANIZED HEALTH CARE EDUCATION/TRAINING PROGRAM

## 2021-11-09 PROCEDURE — 1159F PR MEDICATION LIST DOCUMENTED IN MEDICAL RECORD: ICD-10-PCS | Mod: CPTII,S$GLB,, | Performed by: STUDENT IN AN ORGANIZED HEALTH CARE EDUCATION/TRAINING PROGRAM

## 2021-11-09 PROCEDURE — 3044F PR MOST RECENT HEMOGLOBIN A1C LEVEL <7.0%: ICD-10-PCS | Mod: CPTII,S$GLB,, | Performed by: STUDENT IN AN ORGANIZED HEALTH CARE EDUCATION/TRAINING PROGRAM

## 2021-11-09 PROCEDURE — 3072F PR LOW RISK FOR RETINOPATHY: ICD-10-PCS | Mod: CPTII,S$GLB,, | Performed by: STUDENT IN AN ORGANIZED HEALTH CARE EDUCATION/TRAINING PROGRAM

## 2021-11-09 PROCEDURE — 99499 NO LOS: ICD-10-PCS | Mod: S$GLB,,, | Performed by: STUDENT IN AN ORGANIZED HEALTH CARE EDUCATION/TRAINING PROGRAM

## 2021-11-09 PROCEDURE — 99499 UNLISTED E&M SERVICE: CPT | Mod: S$GLB,,, | Performed by: STUDENT IN AN ORGANIZED HEALTH CARE EDUCATION/TRAINING PROGRAM

## 2021-11-09 PROCEDURE — 99999 PR PBB SHADOW E&M-EST. PATIENT-LVL III: ICD-10-PCS | Mod: PBBFAC,,, | Performed by: STUDENT IN AN ORGANIZED HEALTH CARE EDUCATION/TRAINING PROGRAM

## 2021-11-09 PROCEDURE — 20611 LARGE JOINT ASPIRATION/INJECTION: L KNEE: ICD-10-PCS | Mod: LT,S$GLB,, | Performed by: STUDENT IN AN ORGANIZED HEALTH CARE EDUCATION/TRAINING PROGRAM

## 2021-11-09 PROCEDURE — 1159F MED LIST DOCD IN RCRD: CPT | Mod: CPTII,S$GLB,, | Performed by: STUDENT IN AN ORGANIZED HEALTH CARE EDUCATION/TRAINING PROGRAM

## 2021-11-09 PROCEDURE — 1160F PR REVIEW ALL MEDS BY PRESCRIBER/CLIN PHARMACIST DOCUMENTED: ICD-10-PCS | Mod: CPTII,S$GLB,, | Performed by: STUDENT IN AN ORGANIZED HEALTH CARE EDUCATION/TRAINING PROGRAM

## 2021-11-09 PROCEDURE — 1160F RVW MEDS BY RX/DR IN RCRD: CPT | Mod: CPTII,S$GLB,, | Performed by: STUDENT IN AN ORGANIZED HEALTH CARE EDUCATION/TRAINING PROGRAM

## 2021-11-09 PROCEDURE — 3080F PR MOST RECENT DIASTOLIC BLOOD PRESSURE >= 90 MM HG: ICD-10-PCS | Mod: CPTII,S$GLB,, | Performed by: STUDENT IN AN ORGANIZED HEALTH CARE EDUCATION/TRAINING PROGRAM

## 2021-11-09 PROCEDURE — 3044F HG A1C LEVEL LT 7.0%: CPT | Mod: CPTII,S$GLB,, | Performed by: STUDENT IN AN ORGANIZED HEALTH CARE EDUCATION/TRAINING PROGRAM

## 2021-11-09 PROCEDURE — 3077F SYST BP >= 140 MM HG: CPT | Mod: CPTII,S$GLB,, | Performed by: STUDENT IN AN ORGANIZED HEALTH CARE EDUCATION/TRAINING PROGRAM

## 2021-11-09 PROCEDURE — 3008F BODY MASS INDEX DOCD: CPT | Mod: CPTII,S$GLB,, | Performed by: STUDENT IN AN ORGANIZED HEALTH CARE EDUCATION/TRAINING PROGRAM

## 2021-11-09 PROCEDURE — 3072F LOW RISK FOR RETINOPATHY: CPT | Mod: CPTII,S$GLB,, | Performed by: STUDENT IN AN ORGANIZED HEALTH CARE EDUCATION/TRAINING PROGRAM

## 2021-11-09 PROCEDURE — 20611 DRAIN/INJ JOINT/BURSA W/US: CPT | Mod: LT,S$GLB,, | Performed by: STUDENT IN AN ORGANIZED HEALTH CARE EDUCATION/TRAINING PROGRAM

## 2021-11-09 PROCEDURE — 3008F PR BODY MASS INDEX (BMI) DOCUMENTED: ICD-10-PCS | Mod: CPTII,S$GLB,, | Performed by: STUDENT IN AN ORGANIZED HEALTH CARE EDUCATION/TRAINING PROGRAM

## 2021-11-16 ENCOUNTER — OFFICE VISIT (OUTPATIENT)
Dept: SPORTS MEDICINE | Facility: CLINIC | Age: 46
End: 2021-11-16
Payer: COMMERCIAL

## 2021-11-16 ENCOUNTER — LAB VISIT (OUTPATIENT)
Dept: LAB | Facility: HOSPITAL | Age: 46
End: 2021-11-16
Attending: INTERNAL MEDICINE
Payer: COMMERCIAL

## 2021-11-16 VITALS
DIASTOLIC BLOOD PRESSURE: 94 MMHG | HEIGHT: 68 IN | WEIGHT: 271.81 LBS | BODY MASS INDEX: 41.19 KG/M2 | SYSTOLIC BLOOD PRESSURE: 153 MMHG

## 2021-11-16 DIAGNOSIS — M17.12 PRIMARY OSTEOARTHRITIS OF LEFT KNEE: Primary | ICD-10-CM

## 2021-11-16 DIAGNOSIS — E66.01 CLASS 3 SEVERE OBESITY WITH BODY MASS INDEX (BMI) OF 40.0 TO 44.9 IN ADULT, UNSPECIFIED OBESITY TYPE, UNSPECIFIED WHETHER SERIOUS COMORBIDITY PRESENT: ICD-10-CM

## 2021-11-16 DIAGNOSIS — E78.5 HYPERLIPIDEMIA, UNSPECIFIED HYPERLIPIDEMIA TYPE: ICD-10-CM

## 2021-11-16 DIAGNOSIS — E11.65 TYPE 2 DIABETES MELLITUS WITH HYPERGLYCEMIA, WITHOUT LONG-TERM CURRENT USE OF INSULIN: ICD-10-CM

## 2021-11-16 LAB
ALBUMIN SERPL BCP-MCNC: 4.2 G/DL (ref 3.5–5.2)
ALP SERPL-CCNC: 52 U/L (ref 55–135)
ALT SERPL W/O P-5'-P-CCNC: 23 U/L (ref 10–44)
ANION GAP SERPL CALC-SCNC: 9 MMOL/L (ref 8–16)
AST SERPL-CCNC: 17 U/L (ref 10–40)
BILIRUB SERPL-MCNC: 0.6 MG/DL (ref 0.1–1)
BUN SERPL-MCNC: 16 MG/DL (ref 6–20)
CALCIUM SERPL-MCNC: 9.9 MG/DL (ref 8.7–10.5)
CHLORIDE SERPL-SCNC: 104 MMOL/L (ref 95–110)
CHOLEST SERPL-MCNC: 184 MG/DL (ref 120–199)
CHOLEST/HDLC SERPL: 3.9 {RATIO} (ref 2–5)
CO2 SERPL-SCNC: 29 MMOL/L (ref 23–29)
CREAT SERPL-MCNC: 1.2 MG/DL (ref 0.5–1.4)
EST. GFR  (AFRICAN AMERICAN): >60 ML/MIN/1.73 M^2
EST. GFR  (NON AFRICAN AMERICAN): >60 ML/MIN/1.73 M^2
ESTIMATED AVG GLUCOSE: 111 MG/DL (ref 68–131)
GLUCOSE SERPL-MCNC: 110 MG/DL (ref 70–110)
HBA1C MFR BLD: 5.5 % (ref 4–5.6)
HDLC SERPL-MCNC: 47 MG/DL (ref 40–75)
HDLC SERPL: 25.5 % (ref 20–50)
LDLC SERPL CALC-MCNC: 115.6 MG/DL (ref 63–159)
NONHDLC SERPL-MCNC: 137 MG/DL
POTASSIUM SERPL-SCNC: 3.9 MMOL/L (ref 3.5–5.1)
PROT SERPL-MCNC: 7.6 G/DL (ref 6–8.4)
SODIUM SERPL-SCNC: 142 MMOL/L (ref 136–145)
TRIGL SERPL-MCNC: 107 MG/DL (ref 30–150)

## 2021-11-16 PROCEDURE — 3072F PR LOW RISK FOR RETINOPATHY: ICD-10-PCS | Mod: CPTII,S$GLB,, | Performed by: STUDENT IN AN ORGANIZED HEALTH CARE EDUCATION/TRAINING PROGRAM

## 2021-11-16 PROCEDURE — 99999 PR PBB SHADOW E&M-EST. PATIENT-LVL III: CPT | Mod: PBBFAC,,, | Performed by: STUDENT IN AN ORGANIZED HEALTH CARE EDUCATION/TRAINING PROGRAM

## 2021-11-16 PROCEDURE — 3008F PR BODY MASS INDEX (BMI) DOCUMENTED: ICD-10-PCS | Mod: CPTII,S$GLB,, | Performed by: STUDENT IN AN ORGANIZED HEALTH CARE EDUCATION/TRAINING PROGRAM

## 2021-11-16 PROCEDURE — 99999 PR PBB SHADOW E&M-EST. PATIENT-LVL III: ICD-10-PCS | Mod: PBBFAC,,, | Performed by: STUDENT IN AN ORGANIZED HEALTH CARE EDUCATION/TRAINING PROGRAM

## 2021-11-16 PROCEDURE — 3080F DIAST BP >= 90 MM HG: CPT | Mod: CPTII,S$GLB,, | Performed by: STUDENT IN AN ORGANIZED HEALTH CARE EDUCATION/TRAINING PROGRAM

## 2021-11-16 PROCEDURE — 1159F MED LIST DOCD IN RCRD: CPT | Mod: CPTII,S$GLB,, | Performed by: STUDENT IN AN ORGANIZED HEALTH CARE EDUCATION/TRAINING PROGRAM

## 2021-11-16 PROCEDURE — 20611 LARGE JOINT ASPIRATION/INJECTION: L KNEE: ICD-10-PCS | Mod: LT,S$GLB,, | Performed by: STUDENT IN AN ORGANIZED HEALTH CARE EDUCATION/TRAINING PROGRAM

## 2021-11-16 PROCEDURE — 1160F PR REVIEW ALL MEDS BY PRESCRIBER/CLIN PHARMACIST DOCUMENTED: ICD-10-PCS | Mod: CPTII,S$GLB,, | Performed by: STUDENT IN AN ORGANIZED HEALTH CARE EDUCATION/TRAINING PROGRAM

## 2021-11-16 PROCEDURE — 99499 NO LOS: ICD-10-PCS | Mod: S$GLB,,, | Performed by: STUDENT IN AN ORGANIZED HEALTH CARE EDUCATION/TRAINING PROGRAM

## 2021-11-16 PROCEDURE — 80061 LIPID PANEL: CPT | Performed by: INTERNAL MEDICINE

## 2021-11-16 PROCEDURE — 3080F PR MOST RECENT DIASTOLIC BLOOD PRESSURE >= 90 MM HG: ICD-10-PCS | Mod: CPTII,S$GLB,, | Performed by: STUDENT IN AN ORGANIZED HEALTH CARE EDUCATION/TRAINING PROGRAM

## 2021-11-16 PROCEDURE — 80053 COMPREHEN METABOLIC PANEL: CPT | Performed by: INTERNAL MEDICINE

## 2021-11-16 PROCEDURE — 3077F PR MOST RECENT SYSTOLIC BLOOD PRESSURE >= 140 MM HG: ICD-10-PCS | Mod: CPTII,S$GLB,, | Performed by: STUDENT IN AN ORGANIZED HEALTH CARE EDUCATION/TRAINING PROGRAM

## 2021-11-16 PROCEDURE — 20611 DRAIN/INJ JOINT/BURSA W/US: CPT | Mod: LT,S$GLB,, | Performed by: STUDENT IN AN ORGANIZED HEALTH CARE EDUCATION/TRAINING PROGRAM

## 2021-11-16 PROCEDURE — 1160F RVW MEDS BY RX/DR IN RCRD: CPT | Mod: CPTII,S$GLB,, | Performed by: STUDENT IN AN ORGANIZED HEALTH CARE EDUCATION/TRAINING PROGRAM

## 2021-11-16 PROCEDURE — 3044F PR MOST RECENT HEMOGLOBIN A1C LEVEL <7.0%: ICD-10-PCS | Mod: CPTII,S$GLB,, | Performed by: STUDENT IN AN ORGANIZED HEALTH CARE EDUCATION/TRAINING PROGRAM

## 2021-11-16 PROCEDURE — 99499 UNLISTED E&M SERVICE: CPT | Mod: S$GLB,,, | Performed by: STUDENT IN AN ORGANIZED HEALTH CARE EDUCATION/TRAINING PROGRAM

## 2021-11-16 PROCEDURE — 3072F LOW RISK FOR RETINOPATHY: CPT | Mod: CPTII,S$GLB,, | Performed by: STUDENT IN AN ORGANIZED HEALTH CARE EDUCATION/TRAINING PROGRAM

## 2021-11-16 PROCEDURE — 3008F BODY MASS INDEX DOCD: CPT | Mod: CPTII,S$GLB,, | Performed by: STUDENT IN AN ORGANIZED HEALTH CARE EDUCATION/TRAINING PROGRAM

## 2021-11-16 PROCEDURE — 83036 HEMOGLOBIN GLYCOSYLATED A1C: CPT | Performed by: INTERNAL MEDICINE

## 2021-11-16 PROCEDURE — 3077F SYST BP >= 140 MM HG: CPT | Mod: CPTII,S$GLB,, | Performed by: STUDENT IN AN ORGANIZED HEALTH CARE EDUCATION/TRAINING PROGRAM

## 2021-11-16 PROCEDURE — 36415 COLL VENOUS BLD VENIPUNCTURE: CPT | Mod: PN | Performed by: INTERNAL MEDICINE

## 2021-11-16 PROCEDURE — 3044F HG A1C LEVEL LT 7.0%: CPT | Mod: CPTII,S$GLB,, | Performed by: STUDENT IN AN ORGANIZED HEALTH CARE EDUCATION/TRAINING PROGRAM

## 2021-11-16 PROCEDURE — 1159F PR MEDICATION LIST DOCUMENTED IN MEDICAL RECORD: ICD-10-PCS | Mod: CPTII,S$GLB,, | Performed by: STUDENT IN AN ORGANIZED HEALTH CARE EDUCATION/TRAINING PROGRAM

## 2021-12-07 ENCOUNTER — PATIENT MESSAGE (OUTPATIENT)
Dept: SPORTS MEDICINE | Facility: CLINIC | Age: 46
End: 2021-12-07
Payer: COMMERCIAL

## 2021-12-07 ENCOUNTER — TELEPHONE (OUTPATIENT)
Dept: SPORTS MEDICINE | Facility: CLINIC | Age: 46
End: 2021-12-07
Payer: COMMERCIAL

## 2021-12-07 DIAGNOSIS — M25.531 RIGHT WRIST PAIN: Primary | ICD-10-CM

## 2021-12-14 ENCOUNTER — OFFICE VISIT (OUTPATIENT)
Dept: SPORTS MEDICINE | Facility: CLINIC | Age: 46
End: 2021-12-14
Payer: COMMERCIAL

## 2021-12-14 ENCOUNTER — HOSPITAL ENCOUNTER (OUTPATIENT)
Dept: RADIOLOGY | Facility: HOSPITAL | Age: 46
Discharge: HOME | End: 2021-12-14
Attending: STUDENT IN AN ORGANIZED HEALTH CARE EDUCATION/TRAINING PROGRAM
Payer: COMMERCIAL

## 2021-12-14 VITALS
WEIGHT: 271.81 LBS | DIASTOLIC BLOOD PRESSURE: 104 MMHG | BODY MASS INDEX: 41.19 KG/M2 | HEIGHT: 68 IN | SYSTOLIC BLOOD PRESSURE: 170 MMHG

## 2021-12-14 DIAGNOSIS — M25.531 RIGHT WRIST PAIN: Primary | ICD-10-CM

## 2021-12-14 DIAGNOSIS — M67.431 GANGLION CYST OF DORSUM OF RIGHT WRIST: ICD-10-CM

## 2021-12-14 DIAGNOSIS — M25.531 RIGHT WRIST PAIN: ICD-10-CM

## 2021-12-14 PROCEDURE — 3072F PR LOW RISK FOR RETINOPATHY: ICD-10-PCS | Mod: CPTII,S$GLB,, | Performed by: STUDENT IN AN ORGANIZED HEALTH CARE EDUCATION/TRAINING PROGRAM

## 2021-12-14 PROCEDURE — 99214 OFFICE O/P EST MOD 30 MIN: CPT | Mod: S$GLB,,, | Performed by: STUDENT IN AN ORGANIZED HEALTH CARE EDUCATION/TRAINING PROGRAM

## 2021-12-14 PROCEDURE — 3066F NEPHROPATHY DOC TX: CPT | Mod: CPTII,S$GLB,, | Performed by: STUDENT IN AN ORGANIZED HEALTH CARE EDUCATION/TRAINING PROGRAM

## 2021-12-14 PROCEDURE — 73110 X-RAY EXAM OF WRIST: CPT | Mod: 26,RT,, | Performed by: RADIOLOGY

## 2021-12-14 PROCEDURE — 3061F NEG MICROALBUMINURIA REV: CPT | Mod: CPTII,S$GLB,, | Performed by: STUDENT IN AN ORGANIZED HEALTH CARE EDUCATION/TRAINING PROGRAM

## 2021-12-14 PROCEDURE — 73110 XR WRIST COMPLETE 3 VIEWS RIGHT: ICD-10-PCS | Mod: 26,RT,, | Performed by: RADIOLOGY

## 2021-12-14 PROCEDURE — 99999 PR PBB SHADOW E&M-EST. PATIENT-LVL IV: ICD-10-PCS | Mod: PBBFAC,,, | Performed by: STUDENT IN AN ORGANIZED HEALTH CARE EDUCATION/TRAINING PROGRAM

## 2021-12-14 PROCEDURE — 99999 PR PBB SHADOW E&M-EST. PATIENT-LVL IV: CPT | Mod: PBBFAC,,, | Performed by: STUDENT IN AN ORGANIZED HEALTH CARE EDUCATION/TRAINING PROGRAM

## 2021-12-14 PROCEDURE — 3066F PR DOCUMENTATION OF TREATMENT FOR NEPHROPATHY: ICD-10-PCS | Mod: CPTII,S$GLB,, | Performed by: STUDENT IN AN ORGANIZED HEALTH CARE EDUCATION/TRAINING PROGRAM

## 2021-12-14 PROCEDURE — 3061F PR NEG MICROALBUMINURIA RESULT DOCUMENTED/REVIEW: ICD-10-PCS | Mod: CPTII,S$GLB,, | Performed by: STUDENT IN AN ORGANIZED HEALTH CARE EDUCATION/TRAINING PROGRAM

## 2021-12-14 PROCEDURE — 73110 X-RAY EXAM OF WRIST: CPT | Mod: TC,PN,RT

## 2021-12-14 PROCEDURE — 99214 PR OFFICE/OUTPT VISIT, EST, LEVL IV, 30-39 MIN: ICD-10-PCS | Mod: S$GLB,,, | Performed by: STUDENT IN AN ORGANIZED HEALTH CARE EDUCATION/TRAINING PROGRAM

## 2021-12-14 PROCEDURE — 3072F LOW RISK FOR RETINOPATHY: CPT | Mod: CPTII,S$GLB,, | Performed by: STUDENT IN AN ORGANIZED HEALTH CARE EDUCATION/TRAINING PROGRAM

## 2021-12-27 ENCOUNTER — TELEPHONE (OUTPATIENT)
Dept: ORTHOPEDICS | Facility: CLINIC | Age: 46
End: 2021-12-27
Payer: COMMERCIAL

## 2021-12-28 NOTE — PROGRESS NOTES
"CC: left knee pain    46 y.o. Male presents today for follow up evaluation of his left knee pain following Euflexxa series. Pt reports he continues to favor his right knee, but states he has seen improvement, especially with stairs. Pt reports his pain is 0/10 today. Pt reports intermittent clicking/popping. Pt denies numbness/tingling.     Attempted treatments: Euflexxa  Pain score: 0/10  History of trauma/injury: none since last visit  Affecting ADLs: "a little"      REVIEW OF SYSTEMS:   Constitution: Patient denies fever or chills.  Eyes: Patient denies eye pain or vision changes.  CVS: Patient denies chest pain.  Lungs: Patient denies shortness of breath or cough.  Skin: Patient denies skin rash or itching.    Musculoskeletal: Patient denies recent falls. See HPI.  Psych: Patient denies any current anxiety or nervousness.    PAST MEDICAL HISTORY:   Past Medical History:   Diagnosis Date    Diabetes mellitus     Hyperlipidemia     Hypertension     Obesity     Sleep apnea        MEDICATIONS:     Current Outpatient Medications:     amLODIPine (NORVASC) 10 MG tablet, TAKE ONE TABLET BY MOUTH DAILY, Disp: 90 tablet, Rfl: 2    blood sugar diagnostic Strp, 1 strip by Misc.(Non-Drug; Combo Route) route 3 (three) times daily with meals., Disp: 300 strip, Rfl: 11    clindamycin-tretinoin (ZIANA) gel, , Disp: , Rfl:     clobetasoL (TEMOVATE) 0.05 % cream, Apply to affected area on right hand twice daily during flares. Use for two weeks at a time. Do no apply to face, underarms, or groin., Disp: 30 g, Rfl: 2    insulin (LANTUS SOLOSTAR U-100 INSULIN) glargine 100 units/mL (3mL) SubQ pen, Inject 10 Units into the skin every evening., Disp: 1 Box, Rfl: 15    lancets Misc, 1 lancet by Misc.(Non-Drug; Combo Route) route 3 (three) times daily with meals., Disp: 300 each, Rfl: 11    lancing device Misc, 1 Device by Misc.(Non-Drug; Combo Route) route 2 (two) times daily with meals., Disp: 1 each, Rfl: 0    metFORMIN " "(GLUCOPHAGE) 500 MG tablet, Take 1 tablet (500 mg total) by mouth 2 (two) times daily with meals., Disp: 180 tablet, Rfl: 3    pen needle, diabetic (NOVOFINE PLUS) 32 gauge x 1/6" Ndle, 1 pen by Misc.(Non-Drug; Combo Route) route 3 (three) times daily., Disp: 100 each, Rfl: 15    pravastatin (PRAVACHOL) 20 MG tablet, TAKE ONE TABLET BY MOUTH DAILY, Disp: 90 tablet, Rfl: 2    valsartan-hydrochlorothiazide (DIOVAN-HCT) 320-25 mg per tablet, TAKE 1 TABLET BY MOUTH ONCE DAILY. , Disp: 90 tablet, Rfl: 3    albuterol (PROAIR HFA) 90 mcg/actuation inhaler, Inhale 2 puffs into the lungs every 6 (six) hours as needed for Wheezing or Shortness of Breath. Rescue, Disp: 18 g, Rfl: 2    blood-glucose meter (ONETOUCH VERIO SYSTEM) Misc, 1 Units by Misc.(Non-Drug; Combo Route) route once daily., Disp: 1 each, Rfl: 0    ALLERGIES:   Review of patient's allergies indicates:   Allergen Reactions    Keflex [cephalexin] Rash        PHYSICAL EXAMINATION:  BP (!) 169/99   Ht 5' 8" (1.727 m)   Wt 122.9 kg (270 lb 15.1 oz)   BMI 41.20 kg/m²   Vitals signs and nursing note have been reviewed.    General: In no acute distress, well developed, well nourished, no diaphoresis  Eyes: EOM full and smooth, no eye redness or discharge  HENT: normocephalic and atraumatic, neck supple, trachea midline, no nasal discharge  Cardiovascular: no LE edema  Lungs: respirations non-labored, no conversational dyspnea   Neuro: AAOx3, CN2-12 grossly intact  Skin: No rashes, warm and dry  Psychiatric: cooperative, pleasant, mood and affect appropriate for age    Left Knee:   Gait: normal    Inspection/Palpation:   -Rubor   -Calor  -Effusion   -Patella ballotable   -Patellar apprehension  -Retinacular tenderness   -Patellar crepitus   Patellar tilt grossly normal     TTP at:  -Joint line   -MCL   -LCL   -Popliteal region   -Quad tendon   -Patella  -Pat tendon  -Pat border  -Med condyle   -Lat condyle   -Pes   -Prox fibula   -Tib tub  -Gerdy's " tubercle  -Distal Hamstring tendons  -Proximal Hamstrings/Ischial tuberosity  -ITB    ROM:   Ext: 0°   Flex:140°   Popliteal Angle: 30°   -Discomfort w/ full flex   -Bounce-home discomfort     Ligamentous:   -Ant drawer   -Post drawer   -Lachman's   Good endpoints & no pain w/ valgus & varus stress    Meniscal:  -Rachael's   -Pablo   -Pain w/ squat     ASSESSMENT:      ICD-10-CM ICD-9-CM   1. Chronic pain of left knee  M25.562 719.46    G89.29 338.29   2. Primary osteoarthritis of left knee  M17.12 715.16         PLAN:  Patient much improved since receiving Euflexxa hyaluronic acid injection series.  We will plan to repeat series at 6 months.    Future planning includes - repeat Euflexxa series at 6 months    All questions were answered to the best of my ability and all concerns were addressed at this time.    Follow up for above, or sooner if needed.      This note is dictated using the M*Modal Fluency Direct word recognition program. There are word recognition mistakes that are occasionally missed on review.

## 2021-12-29 ENCOUNTER — OFFICE VISIT (OUTPATIENT)
Dept: ORTHOPEDICS | Facility: CLINIC | Age: 46
End: 2021-12-29
Payer: COMMERCIAL

## 2021-12-29 VITALS — BODY MASS INDEX: 41.07 KG/M2 | HEIGHT: 68 IN | WEIGHT: 271 LBS

## 2021-12-29 DIAGNOSIS — M25.531 RIGHT WRIST PAIN: ICD-10-CM

## 2021-12-29 DIAGNOSIS — M67.431 GANGLION CYST OF DORSUM OF RIGHT WRIST: ICD-10-CM

## 2021-12-29 DIAGNOSIS — M25.50 ARTHRALGIA, UNSPECIFIED JOINT: Primary | ICD-10-CM

## 2021-12-29 PROCEDURE — 3061F PR NEG MICROALBUMINURIA RESULT DOCUMENTED/REVIEW: ICD-10-PCS | Mod: CPTII,S$GLB,, | Performed by: PHYSICIAN ASSISTANT

## 2021-12-29 PROCEDURE — 3008F PR BODY MASS INDEX (BMI) DOCUMENTED: ICD-10-PCS | Mod: CPTII,S$GLB,, | Performed by: PHYSICIAN ASSISTANT

## 2021-12-29 PROCEDURE — 1159F PR MEDICATION LIST DOCUMENTED IN MEDICAL RECORD: ICD-10-PCS | Mod: CPTII,S$GLB,, | Performed by: PHYSICIAN ASSISTANT

## 2021-12-29 PROCEDURE — 3044F PR MOST RECENT HEMOGLOBIN A1C LEVEL <7.0%: ICD-10-PCS | Mod: CPTII,S$GLB,, | Performed by: PHYSICIAN ASSISTANT

## 2021-12-29 PROCEDURE — 1159F MED LIST DOCD IN RCRD: CPT | Mod: CPTII,S$GLB,, | Performed by: PHYSICIAN ASSISTANT

## 2021-12-29 PROCEDURE — 3066F PR DOCUMENTATION OF TREATMENT FOR NEPHROPATHY: ICD-10-PCS | Mod: CPTII,S$GLB,, | Performed by: PHYSICIAN ASSISTANT

## 2021-12-29 PROCEDURE — 20605 DRAIN/INJ JOINT/BURSA W/O US: CPT | Mod: RT,S$GLB,, | Performed by: PHYSICIAN ASSISTANT

## 2021-12-29 PROCEDURE — 3072F LOW RISK FOR RETINOPATHY: CPT | Mod: CPTII,S$GLB,, | Performed by: PHYSICIAN ASSISTANT

## 2021-12-29 PROCEDURE — 20605 PR DRAIN/INJECT INTERMEDIATE JOINT/BURSA: ICD-10-PCS | Mod: RT,S$GLB,, | Performed by: PHYSICIAN ASSISTANT

## 2021-12-29 PROCEDURE — 99999 PR PBB SHADOW E&M-EST. PATIENT-LVL III: ICD-10-PCS | Mod: PBBFAC,,, | Performed by: PHYSICIAN ASSISTANT

## 2021-12-29 PROCEDURE — 3066F NEPHROPATHY DOC TX: CPT | Mod: CPTII,S$GLB,, | Performed by: PHYSICIAN ASSISTANT

## 2021-12-29 PROCEDURE — 3061F NEG MICROALBUMINURIA REV: CPT | Mod: CPTII,S$GLB,, | Performed by: PHYSICIAN ASSISTANT

## 2021-12-29 PROCEDURE — 3044F HG A1C LEVEL LT 7.0%: CPT | Mod: CPTII,S$GLB,, | Performed by: PHYSICIAN ASSISTANT

## 2021-12-29 PROCEDURE — 99999 PR PBB SHADOW E&M-EST. PATIENT-LVL III: CPT | Mod: PBBFAC,,, | Performed by: PHYSICIAN ASSISTANT

## 2021-12-29 PROCEDURE — 99204 OFFICE O/P NEW MOD 45 MIN: CPT | Mod: 25,S$GLB,, | Performed by: PHYSICIAN ASSISTANT

## 2021-12-29 PROCEDURE — 3008F BODY MASS INDEX DOCD: CPT | Mod: CPTII,S$GLB,, | Performed by: PHYSICIAN ASSISTANT

## 2021-12-29 PROCEDURE — 3072F PR LOW RISK FOR RETINOPATHY: ICD-10-PCS | Mod: CPTII,S$GLB,, | Performed by: PHYSICIAN ASSISTANT

## 2021-12-29 PROCEDURE — 99204 PR OFFICE/OUTPT VISIT, NEW, LEVL IV, 45-59 MIN: ICD-10-PCS | Mod: 25,S$GLB,, | Performed by: PHYSICIAN ASSISTANT

## 2021-12-29 RX ORDER — DEXAMETHASONE SODIUM PHOSPHATE 4 MG/ML
4 INJECTION, SOLUTION INTRA-ARTICULAR; INTRALESIONAL; INTRAMUSCULAR; INTRAVENOUS; SOFT TISSUE
Status: COMPLETED | OUTPATIENT
Start: 2021-12-29 | End: 2021-12-29

## 2021-12-29 RX ORDER — LIDOCAINE HYDROCHLORIDE 10 MG/ML
1 INJECTION, SOLUTION EPIDURAL; INFILTRATION; INTRACAUDAL; PERINEURAL ONCE
Status: COMPLETED | OUTPATIENT
Start: 2021-12-29 | End: 2021-12-29

## 2021-12-29 RX ADMIN — DEXAMETHASONE SODIUM PHOSPHATE 4 MG: 4 INJECTION, SOLUTION INTRA-ARTICULAR; INTRALESIONAL; INTRAMUSCULAR; INTRAVENOUS; SOFT TISSUE at 08:12

## 2021-12-29 RX ADMIN — LIDOCAINE HYDROCHLORIDE 10 MG: 10 INJECTION, SOLUTION EPIDURAL; INFILTRATION; INTRACAUDAL; PERINEURAL at 09:12

## 2022-01-04 ENCOUNTER — OFFICE VISIT (OUTPATIENT)
Dept: SPORTS MEDICINE | Facility: CLINIC | Age: 47
End: 2022-01-04
Payer: COMMERCIAL

## 2022-01-04 VITALS
DIASTOLIC BLOOD PRESSURE: 99 MMHG | SYSTOLIC BLOOD PRESSURE: 169 MMHG | BODY MASS INDEX: 41.06 KG/M2 | WEIGHT: 270.94 LBS | HEIGHT: 68 IN

## 2022-01-04 DIAGNOSIS — G89.29 CHRONIC PAIN OF LEFT KNEE: ICD-10-CM

## 2022-01-04 DIAGNOSIS — M25.562 CHRONIC PAIN OF LEFT KNEE: ICD-10-CM

## 2022-01-04 DIAGNOSIS — M17.12 PRIMARY OSTEOARTHRITIS OF LEFT KNEE: Primary | ICD-10-CM

## 2022-01-04 PROCEDURE — 1160F RVW MEDS BY RX/DR IN RCRD: CPT | Mod: CPTII,S$GLB,, | Performed by: STUDENT IN AN ORGANIZED HEALTH CARE EDUCATION/TRAINING PROGRAM

## 2022-01-04 PROCEDURE — 99213 PR OFFICE/OUTPT VISIT, EST, LEVL III, 20-29 MIN: ICD-10-PCS | Mod: S$GLB,,, | Performed by: STUDENT IN AN ORGANIZED HEALTH CARE EDUCATION/TRAINING PROGRAM

## 2022-01-04 PROCEDURE — 1159F MED LIST DOCD IN RCRD: CPT | Mod: CPTII,S$GLB,, | Performed by: STUDENT IN AN ORGANIZED HEALTH CARE EDUCATION/TRAINING PROGRAM

## 2022-01-04 PROCEDURE — 99213 OFFICE O/P EST LOW 20 MIN: CPT | Mod: S$GLB,,, | Performed by: STUDENT IN AN ORGANIZED HEALTH CARE EDUCATION/TRAINING PROGRAM

## 2022-01-04 PROCEDURE — 1159F PR MEDICATION LIST DOCUMENTED IN MEDICAL RECORD: ICD-10-PCS | Mod: CPTII,S$GLB,, | Performed by: STUDENT IN AN ORGANIZED HEALTH CARE EDUCATION/TRAINING PROGRAM

## 2022-01-04 PROCEDURE — 3008F BODY MASS INDEX DOCD: CPT | Mod: CPTII,S$GLB,, | Performed by: STUDENT IN AN ORGANIZED HEALTH CARE EDUCATION/TRAINING PROGRAM

## 2022-01-04 PROCEDURE — 3008F PR BODY MASS INDEX (BMI) DOCUMENTED: ICD-10-PCS | Mod: CPTII,S$GLB,, | Performed by: STUDENT IN AN ORGANIZED HEALTH CARE EDUCATION/TRAINING PROGRAM

## 2022-01-04 PROCEDURE — 1160F PR REVIEW ALL MEDS BY PRESCRIBER/CLIN PHARMACIST DOCUMENTED: ICD-10-PCS | Mod: CPTII,S$GLB,, | Performed by: STUDENT IN AN ORGANIZED HEALTH CARE EDUCATION/TRAINING PROGRAM

## 2022-01-04 PROCEDURE — 3080F PR MOST RECENT DIASTOLIC BLOOD PRESSURE >= 90 MM HG: ICD-10-PCS | Mod: CPTII,S$GLB,, | Performed by: STUDENT IN AN ORGANIZED HEALTH CARE EDUCATION/TRAINING PROGRAM

## 2022-01-04 PROCEDURE — 3077F SYST BP >= 140 MM HG: CPT | Mod: CPTII,S$GLB,, | Performed by: STUDENT IN AN ORGANIZED HEALTH CARE EDUCATION/TRAINING PROGRAM

## 2022-01-04 PROCEDURE — 3077F PR MOST RECENT SYSTOLIC BLOOD PRESSURE >= 140 MM HG: ICD-10-PCS | Mod: CPTII,S$GLB,, | Performed by: STUDENT IN AN ORGANIZED HEALTH CARE EDUCATION/TRAINING PROGRAM

## 2022-01-04 PROCEDURE — 99999 PR PBB SHADOW E&M-EST. PATIENT-LVL III: CPT | Mod: PBBFAC,,, | Performed by: STUDENT IN AN ORGANIZED HEALTH CARE EDUCATION/TRAINING PROGRAM

## 2022-01-04 PROCEDURE — 99999 PR PBB SHADOW E&M-EST. PATIENT-LVL III: ICD-10-PCS | Mod: PBBFAC,,, | Performed by: STUDENT IN AN ORGANIZED HEALTH CARE EDUCATION/TRAINING PROGRAM

## 2022-01-04 PROCEDURE — 3080F DIAST BP >= 90 MM HG: CPT | Mod: CPTII,S$GLB,, | Performed by: STUDENT IN AN ORGANIZED HEALTH CARE EDUCATION/TRAINING PROGRAM

## 2022-02-04 ENCOUNTER — TELEPHONE (OUTPATIENT)
Dept: ORTHOPEDICS | Facility: CLINIC | Age: 47
End: 2022-02-04
Payer: COMMERCIAL

## 2022-02-04 NOTE — TELEPHONE ENCOUNTER
Left VM for pt. Confirmed appt location & time c Dr. Sumner 02/08/22    Requested a call back to the [facility] at [phone] with any questions, concerns or need for a different appointment time.

## 2022-02-08 ENCOUNTER — OFFICE VISIT (OUTPATIENT)
Dept: ORTHOPEDICS | Facility: CLINIC | Age: 47
End: 2022-02-08
Payer: COMMERCIAL

## 2022-02-08 VITALS — HEIGHT: 68 IN | BODY MASS INDEX: 40.92 KG/M2 | WEIGHT: 270 LBS

## 2022-02-08 DIAGNOSIS — M67.431 GANGLION CYST OF DORSUM OF RIGHT WRIST: Primary | ICD-10-CM

## 2022-02-08 DIAGNOSIS — M25.531 RIGHT WRIST PAIN: ICD-10-CM

## 2022-02-08 PROCEDURE — 99214 OFFICE O/P EST MOD 30 MIN: CPT | Mod: 25,S$GLB,, | Performed by: ORTHOPAEDIC SURGERY

## 2022-02-08 PROCEDURE — 1159F PR MEDICATION LIST DOCUMENTED IN MEDICAL RECORD: ICD-10-PCS | Mod: CPTII,S$GLB,, | Performed by: ORTHOPAEDIC SURGERY

## 2022-02-08 PROCEDURE — 20550 NJX 1 TENDON SHEATH/LIGAMENT: CPT | Mod: RT,S$GLB,, | Performed by: ORTHOPAEDIC SURGERY

## 2022-02-08 PROCEDURE — 99999 PR PBB SHADOW E&M-EST. PATIENT-LVL III: ICD-10-PCS | Mod: PBBFAC,,, | Performed by: ORTHOPAEDIC SURGERY

## 2022-02-08 PROCEDURE — 3008F BODY MASS INDEX DOCD: CPT | Mod: CPTII,S$GLB,, | Performed by: ORTHOPAEDIC SURGERY

## 2022-02-08 PROCEDURE — 99214 PR OFFICE/OUTPT VISIT, EST, LEVL IV, 30-39 MIN: ICD-10-PCS | Mod: 25,S$GLB,, | Performed by: ORTHOPAEDIC SURGERY

## 2022-02-08 PROCEDURE — 1159F MED LIST DOCD IN RCRD: CPT | Mod: CPTII,S$GLB,, | Performed by: ORTHOPAEDIC SURGERY

## 2022-02-08 PROCEDURE — 99999 PR PBB SHADOW E&M-EST. PATIENT-LVL III: CPT | Mod: PBBFAC,,, | Performed by: ORTHOPAEDIC SURGERY

## 2022-02-08 PROCEDURE — 3008F PR BODY MASS INDEX (BMI) DOCUMENTED: ICD-10-PCS | Mod: CPTII,S$GLB,, | Performed by: ORTHOPAEDIC SURGERY

## 2022-02-08 PROCEDURE — 20550 PR INJECT TENDON SHEATH/LIGAMENT: ICD-10-PCS | Mod: RT,S$GLB,, | Performed by: ORTHOPAEDIC SURGERY

## 2022-02-08 RX ORDER — TRIAMCINOLONE ACETONIDE 40 MG/ML
40 INJECTION, SUSPENSION INTRA-ARTICULAR; INTRAMUSCULAR
Status: DISCONTINUED | OUTPATIENT
Start: 2022-02-08 | End: 2022-02-08 | Stop reason: HOSPADM

## 2022-02-08 RX ADMIN — TRIAMCINOLONE ACETONIDE 40 MG: 40 INJECTION, SUSPENSION INTRA-ARTICULAR; INTRAMUSCULAR at 10:02

## 2022-02-08 NOTE — PROGRESS NOTES
Subjective:      Patient ID: Deepak Tobias is a 46 y.o. male.    Chief Complaint: No chief complaint on file.      HPI  12/29/22  Deepak Tobias is a 46 y.o. male presenting today for evaluation of the right wrist. He reports onset of a mass over the mid dorsal wrist about 4 mos ago. Denies injury. Overall mass has been stable in size, there was one instance when the mass temporarily increased in size but has since been stable. He reports discomfort in the wrist increased with use, specifically with activities that involved wrist rotation or use of the thumb and small finger. He has not yet tried anything. Denies numbness. He was evaluated by his PCP who ordered a MRI which showed a ganglion cyst. Of notes he reports pain throughout multiple joints in the lower and upper extremities he has previously seen Rheumatology, was recommended to f/u but did not.    02/08/22  Deepak Tobias is a 46 y.o. male returns for right wrist evaluation. He reports no improvement following right wrist injection 12/29/21. He continues to have throbbing pain along dorsal aspect of wrist on occasion, dull ache at baseline, sharp pain with activity. He states that the area does change in size, sometimes gets larger.      Review of patient's allergies indicates:   Allergen Reactions    Keflex [cephalexin] Rash         Current Outpatient Medications   Medication Sig Dispense Refill    albuterol (PROAIR HFA) 90 mcg/actuation inhaler Inhale 2 puffs into the lungs every 6 (six) hours as needed for Wheezing or Shortness of Breath. Rescue 18 g 2    amLODIPine (NORVASC) 10 MG tablet TAKE ONE TABLET BY MOUTH DAILY 90 tablet 2    blood sugar diagnostic Strp 1 strip by Misc.(Non-Drug; Combo Route) route 3 (three) times daily with meals. 300 strip 11    blood-glucose meter (ONETOUCH VERIO SYSTEM) Misc 1 Units by Misc.(Non-Drug; Combo Route) route once daily. 1 each 0    clindamycin-tretinoin (ZIANA) gel       clobetasoL (TEMOVATE) 0.05 %  "cream Apply to affected area on right hand twice daily during flares. Use for two weeks at a time. Do no apply to face, underarms, or groin. 30 g 2    insulin (LANTUS SOLOSTAR U-100 INSULIN) glargine 100 units/mL (3mL) SubQ pen Inject 10 Units into the skin every evening. 1 Box 15    lancets Misc 1 lancet by Misc.(Non-Drug; Combo Route) route 3 (three) times daily with meals. 300 each 11    lancing device Misc 1 Device by Misc.(Non-Drug; Combo Route) route 2 (two) times daily with meals. 1 each 0    metFORMIN (GLUCOPHAGE) 500 MG tablet Take 1 tablet (500 mg total) by mouth 2 (two) times daily with meals. 180 tablet 3    pen needle, diabetic (NOVOFINE PLUS) 32 gauge x 1/6" Ndle 1 pen by Misc.(Non-Drug; Combo Route) route 3 (three) times daily. 100 each 15    pravastatin (PRAVACHOL) 20 MG tablet TAKE ONE TABLET BY MOUTH DAILY 90 tablet 2    valsartan-hydrochlorothiazide (DIOVAN-HCT) 320-25 mg per tablet TAKE 1 TABLET BY MOUTH ONCE DAILY.  90 tablet 3     No current facility-administered medications for this visit.       Past Medical History:   Diagnosis Date    Diabetes mellitus     Hyperlipidemia     Hypertension     Obesity     Sleep apnea        Past Surgical History:   Procedure Laterality Date    achilies tendon         Review of Systems:  Constitutional: Negative for chills and fever.   Respiratory: Negative for cough and shortness of breath.    Gastrointestinal: Negative for nausea and vomiting.   Skin: Negative for rash.   Neurological: Negative for dizziness and headaches.   Psychiatric/Behavioral: Negative for depression.   MSK as in HPI       OBJECTIVE:     PHYSICAL EXAM:  Ht 5' 8" (1.727 m)   Wt 122.5 kg (270 lb)   BMI 41.05 kg/m²     GEN:  NAD, well-developed, well-groomed.  NEURO: Awake, alert, and oriented. Normal attention and concentration.    PSYCH: Normal mood and affect. Behavior is normal.  HEENT: No cervical lymphadenopathy noted.  CARDIOVASCULAR: Radial pulses 2+ bilaterally. No " LE edema noted.  PULMONARY: Breath sounds normal. No respiratory distress.  SKIN: Intact, no rashes.      MSK:   RUE:  Good active ROM of the wrist and fingers. There is a small mass over the mid dorsal wrist, mass is somewhat firm, consistent with possible. TTP along Listers tubercle. He is minimally ttp over the dorsal wrist. AIN/PIN/Radial/Median/Ulnar Nerves assessed in isolation without deficit. Radial & Ulnar arteries palpated 2+. Capillary Refill <3s.      RADIOGRAPHS:  XR R wrist 12/14/21   No acute fracture or dislocation seen.  No significant soft tissue edema.  No radiopaque retained foreign body identified.    MRI R wrist 12/27/21   Wrist Position: Pronation     Triangular Fibrocartilage  TFC: No radial, central, or ulnar-sided triangular fibrocartilage perforation.Normal foveal and ulnar styloid attachment.     Interosseous Ligaments  Scapholunate ligament: Volar, membranous, and dorsal segments appear intact.  Lunotriquetral ligament: Dorsal, membranous, and volar segments appear intact.  Carpal alignment: Normal.     Extrinsic Ligaments:  Volar radioscaphocapitate and radiolunotriquetral appear intact.  Dorsal intercarpal ligament is intact.Dorsal radiotriquetral ligament is intact.     Bones/Joints  Ulnar Variance: Neutral.  Triscaphe joint: Normal.  Hook of hamate: Intact without fracture.  Scaphoid: Intact without fracture or flexion deformity.  Other bones No fracture, stress reaction, or osseous lesion.  Radio-ulnar joint: Congruent        Articulations  Joint effusion: None.  Cartilage: No hyaline cartilage defects.  Synovium: No synovial thickening.     Tendons  Flexor tendons: Normal. No tear or tendon sheath effusion.  Extensor tendons: Normal. No tear or tendon sheath effusion.  Extensor carpi ulnaris: Intact and normally situated in the ulnar groove.     Musculature  Thenar and hypothenar musculature: Normal.     Nerve  Median nerve with normal signal and appearance through the carpal  tunnel.  Guyon's canal is within normal limits.     Soft tissues  At site of marker, there is a dorsal cystic region, measuring approximately 0.4 x 1.8 cm.  This is at the approximate level of the scapholunate region and although a discrete scapholunate ligamentous tear is not visualized, on the dorsal aspect, there is focal hyperintensity identified on coronal image 8 series 8, which may be extends in from a small perforation or tear at the scapholunate joint without widening of the scapholunate joint space.  A definite connection is not visualized with certainty on the sagittal views.     Impression:  0.4 x 1.8 cm focal high-intensity structure, superficial to the extrinsic ligaments primarily, level of the scapholunate, likely a dorsal ganglion cyst, corresponding with the marker placed by patient..      ASSESSMENT/PLAN:     No diagnosis found.  Plan:   - XR & MRI reviewed  - The patient's pathophysiology was explained in detail, TTP along Listers  - Treatment options were discussed in detail including continued conservative management.   - Proceed with EPL injection   - Follow up PRN      The patient indicates understanding of these issues and agrees to the plan.    This note has been scribed in part by Sheree Barcenas MS, OTC, my Sports Medicine Assistant (SMA). This SMA performed & documented a complete history pre-assessment including the history of present illness, which I, Candis Jaimes MD, explored & confirmed personally with the patient. The SMA has scribed portions of this note including my physical exanimation, diagnostic imaging interpretation, procedures performed, my plan of care & diagnosis. I agree that the scribed documentation is accurate & complete.

## 2022-02-08 NOTE — PROCEDURES
Tendon Sheath    Date/Time: 2/8/2022 10:00 AM  Performed by: Candis Jaimes MD  Authorized by: Candis Jaimes MD     Consent Done?:  Yes (Verbal)  Indications:  Pain  Timeout: prior to procedure the correct patient, procedure, and site was verified    Prep: patient was prepped and draped in usual sterile fashion      Local anesthesia used?: Yes    Anesthesia:  Local infiltration  Local anesthetic:  Lidocaine 1% without epinephrine  Location:  Wrist  : right epl tendon.  Needle size:  25 G  Medications:  40 mg triamcinolone acetonide 40 mg/mL

## 2022-02-10 NOTE — PROGRESS NOTES
Subjective:     Patient ID: Deepak Tobias is a 46 y.o. male sent by Xiao Weeks PA-C for arthralgia    Chief Complaint: No chief complaint on file.       HPI     46 yr old man w DM II, HTN, HLD, morbid obesity & various aches and pains    Has been having chronic joint pain x years. Much of it attributed to sports as a younger man.  Currently his main concern & worse joint is his R wrist. When he uses it, twists it either way he gets pain in an area mid wrist which is palpable & painful. He has a palpable mass that gets bigger and smaller but always there and always painful.  There is no swelling or erythema. MRI (see below) c/w ganglion cyst.  Does not recall specific trauma.     Other joint pains that currently are bothersome include L knee and ankles.  He is followed by Orthopedics for this. There is no swelling of any of his joints. Just pain.    Sometimes has paresthesias across the dorsa or both hands--not so much fingers. Feels a bit clumsy.     Fatigues abruptly when he does things that take effort.    Ibuprofen 400 -600 mg helps once a day but not entirely.  Hot baths, hot showers help.  Moving helps short term.    At night legs & shoulders hurt.    No CTD in family.    Denies depression, sleep disturbance & anxiety but has marked them positive on the IRINEO and explains these sxs are intermittent.    Has AM stiffness that can last 1 hr, but when he exercises only lasts a few minutes.                                   Widespread pain index =9  Shoulder-girdle, left  Shoulder-girdle, right  Hip (buttock, trochanter) left  Hip (buttock, trochanter) right  Lower leg, left  Lower leg, right  Upper back  Lower back  Neck                                         Symptom severity score = 4    Fatigue = 2   Waking Unrefreshed = 1  Cognitive Symptoms = 1    CSI = 52 = Severe    Just started exercising 3 days/wk--walks-aerobically--HR .  Exercise limited by his L knee pain and hand discomfort        Current Outpatient  "Medications   Medication Sig Dispense Refill    albuterol (PROAIR HFA) 90 mcg/actuation inhaler Inhale 2 puffs into the lungs every 6 (six) hours as needed for Wheezing or Shortness of Breath. Rescue 18 g 2    amLODIPine (NORVASC) 10 MG tablet TAKE ONE TABLET BY MOUTH DAILY 90 tablet 2    blood sugar diagnostic Strp 1 strip by Misc.(Non-Drug; Combo Route) route 3 (three) times daily with meals. 300 strip 11    blood-glucose meter (ONETOUCH VERIO SYSTEM) Misc 1 Units by Misc.(Non-Drug; Combo Route) route once daily. 1 each 0    clindamycin-tretinoin (ZIANA) gel       clobetasoL (TEMOVATE) 0.05 % cream Apply to affected area on right hand twice daily during flares. Use for two weeks at a time. Do no apply to face, underarms, or groin. 30 g 2    insulin (LANTUS SOLOSTAR U-100 INSULIN) glargine 100 units/mL (3mL) SubQ pen Inject 10 Units into the skin every evening. 1 Box 15    lancets Misc 1 lancet by Misc.(Non-Drug; Combo Route) route 3 (three) times daily with meals. 300 each 11    lancing device Misc 1 Device by Misc.(Non-Drug; Combo Route) route 2 (two) times daily with meals. 1 each 0    metFORMIN (GLUCOPHAGE) 500 MG tablet Take 1 tablet (500 mg total) by mouth 2 (two) times daily with meals. 180 tablet 3    pen needle, diabetic (NOVOFINE PLUS) 32 gauge x 1/6" Ndle 1 pen by Misc.(Non-Drug; Combo Route) route 3 (three) times daily. 100 each 15    pravastatin (PRAVACHOL) 20 MG tablet TAKE ONE TABLET BY MOUTH DAILY 90 tablet 2    valsartan-hydrochlorothiazide (DIOVAN-HCT) 320-25 mg per tablet TAKE 1 TABLET BY MOUTH ONCE DAILY.  90 tablet 3     No current facility-administered medications for this visit.       Review of patient's allergies indicates:   Allergen Reactions    Keflex [cephalexin] Rash       Review of Systems   Constitutional: Negative for fatigue, fever and unexpected weight change.   HENT: Negative for hearing loss, mouth sores, tinnitus and trouble swallowing.    Eyes: Positive for " "photophobia.        Eye dryness; occas OTC drops   Respiratory: Negative.  Negative for cough, shortness of breath and wheezing.    Cardiovascular: Negative.  Negative for chest pain, palpitations and leg swelling.   Gastrointestinal: Negative.  Negative for abdominal distention, constipation and diarrhea.   Endocrine: Negative.  Negative for cold intolerance and heat intolerance.   Genitourinary: Negative.  Negative for dysuria and hematuria.   Musculoskeletal: Positive for arthralgias, back pain and myalgias. Negative for neck pain.   Neurological: Positive for weakness (hands), light-headedness and numbness (across MCPs; ). Negative for dizziness and headaches.   Psychiatric/Behavioral: Negative for dysphoric mood (not really; active; ) and sleep disturbance. The patient is not nervous/anxious.        Past Medical History:   Diagnosis Date    Diabetes mellitus     Hyperlipidemia     Hypertension     Obesity     Sleep apnea        Past Surgical History:   Procedure Laterality Date    achilies tendon         Family History   Problem Relation Age of Onset    Hypertension Father     Cancer Father     Blindness Mother     Diabetes Maternal Uncle     Acne Neg Hx     Melanoma Neg Hx     Amblyopia Neg Hx     Cataracts Neg Hx     Glaucoma Neg Hx     Macular degeneration Neg Hx     Retinal detachment Neg Hx     Strabismus Neg Hx     Stroke Neg Hx     Thyroid disease Neg Hx    Mom 72 w sarcoid all over; has steel cage in back.   Dad 72 w leukemia; anxiety on meds  1 B vitamin B12 deficiency  1 S colon cancer  3 children A&W  Happily .    Social History     Tobacco Use    Smoking status: Never Smoker    Smokeless tobacco: Never Used   Substance Use Topics    Alcohol use: No    Drug use: No   Air Conditioning person; out of work; cannot carry weight; weighs too much for ladder.  Out of work x 3 years  Wife does well--she supports.     Objective:   /85   Pulse 62   Ht 5' 8" (1.727 m)   " Wt 129.3 kg (285 lb 0.9 oz)   BMI 43.34 kg/m²       Physical Exam   Constitutional: He is oriented to person, place, and time and well-developed, well-nourished, and in no distress. He appears obese. No distress.   HENT:   Head: Normocephalic and atraumatic.   Mouth/Throat: Oropharynx is clear and moist. No oropharyngeal exudate.   No parotidomegaly;   No facial rashes   Eyes: Pupils are equal, round, and reactive to light. Conjunctivae and EOM are normal. No scleral icterus.   Neck: No JVD present. No tracheal deviation present. No thyromegaly present.   Cardiovascular: Normal rate, regular rhythm, normal heart sounds and intact distal pulses. Exam reveals no gallop and no friction rub.   No murmur heard.  Pulmonary/Chest: Effort normal and breath sounds normal. No respiratory distress. He has no wheezes. He has no rales. He exhibits no tenderness.   Abdominal: Soft. Bowel sounds are normal. He exhibits no distension and no mass. There is no splenomegaly or hepatomegaly. There is no abdominal tenderness. There is no rebound and no guarding.   Musculoskeletal:         General: No tenderness or edema.      Comments: No synovitis anywhere.  Has large diffusely puffy hands.  No SHT of TTP of fingers or MCPs  R wrist w pea sized firm movable mass c/w ganglion cyst.  Mild TTP over this area.  L knee w Oshgood Shlatter's prominence.     Lymphadenopathy:     He has no cervical adenopathy.        Right: No inguinal adenopathy present.        Left: No inguinal adenopathy present.   Neurological: He is alert and oriented to person, place, and time. He has normal reflexes. No cranial nerve deficit.   Motor strength: 5/5 prox & distal.  Tinels & Phalens both negative.  Makes strong O's    Skin: Skin is warm and dry. No rash noted.   Psychiatric: Mood, memory, affect and judgment normal.   Vitals reviewed.          12/11/20: ESR 27 (23); CRP 3.9; CBC Hg 13.9; CMP glu 124; RF/CCP neg; UA 7.0  8/27/15: FAUSTINO neg;      12/27/21:  MRI: 0.4 x 1.8 cm focal high-intensity structure, superficial to the extrinsic ligaments primarily, level of the scapholunate, likely a dorsal ganglion cyst, corresponding with the marker placed by patient..  12/14/21: R wrist: personally viewed: unremarkable  10/26/21: L knee: personally viewed:  12/11/210: Arthritis survey: personally viewed  12/22/20: Bilateral shoulders: personally viewed: L calcification vs osteophyte (tendon?) & ? chondrocal on R minimal glenohumeral and acromioclavicular degenerative joint disease  Assessment:   R wrist mass c/w ganglion   Joint pain multiple sites w DJD in multiple joints.  S/P L Osgood Shlatter Disease  Chronic intermittent fatigue   Following Covid 19  DM II-insulin dependent  HLD  HTN  S/P Covid 3/24/20  Morbid Obesity  Plan:   Discussed and educated on ganglion cyst.   Discussed and educated on sports injuries and Osgood Shlatter Disease.   Discussed and educated on fatigue  Discussed no evidence for CTD or gout.  Reassured patient.  Discussed paraffin wax baths  Discussed and educated on pain perception & fatigue and advantages of aerobic exercise.  Discussed and educated on weight loss.  No need for f/u.  RTC prn & at the discretion of his other providers.         CC: Xiao Weeks PA-C

## 2022-02-16 ENCOUNTER — PATIENT OUTREACH (OUTPATIENT)
Dept: ADMINISTRATIVE | Facility: OTHER | Age: 47
End: 2022-02-16
Payer: COMMERCIAL

## 2022-02-16 DIAGNOSIS — Z12.11 ENCOUNTER FOR FIT (FECAL IMMUNOCHEMICAL TEST) SCREENING: Primary | ICD-10-CM

## 2022-02-16 NOTE — PROGRESS NOTES
Health Maintenance Due   Topic Date Due    Colorectal Cancer Screening  Never done    Eye Exam  09/16/2021    Foot Exam  09/21/2021     Updates were requested from care everywhere.  Chart was reviewed for overdue Proactive [facility] Encounters (VEENA) topics (CRS, Breast Cancer Screening, Eye exam)  Health Maintenance has been updated.  LINKS immunization registry triggered.  Immunizations were reconciled.  Fit kit ordered.

## 2022-02-17 ENCOUNTER — OFFICE VISIT (OUTPATIENT)
Dept: RHEUMATOLOGY | Facility: CLINIC | Age: 47
End: 2022-02-17
Payer: COMMERCIAL

## 2022-02-17 VITALS
BODY MASS INDEX: 43.2 KG/M2 | WEIGHT: 285.06 LBS | HEIGHT: 68 IN | SYSTOLIC BLOOD PRESSURE: 133 MMHG | DIASTOLIC BLOOD PRESSURE: 85 MMHG | HEART RATE: 62 BPM

## 2022-02-17 DIAGNOSIS — M92.522 OSGOOD-SCHLATTER'S DISEASE, LEFT: ICD-10-CM

## 2022-02-17 DIAGNOSIS — E66.01 MORBID OBESITY WITH BMI OF 40.0-44.9, ADULT: ICD-10-CM

## 2022-02-17 DIAGNOSIS — M25.50 PAIN IN JOINT INVOLVING MULTIPLE SITES: Primary | ICD-10-CM

## 2022-02-17 DIAGNOSIS — M67.431 GANGLION CYST OF DORSUM OF RIGHT WRIST: ICD-10-CM

## 2022-02-17 DIAGNOSIS — G93.32 POST-COVID CHRONIC FATIGUE: ICD-10-CM

## 2022-02-17 DIAGNOSIS — Z55.9 EDUCATIONAL CIRCUMSTANCE: ICD-10-CM

## 2022-02-17 DIAGNOSIS — M25.50 ARTHRALGIA, UNSPECIFIED JOINT: ICD-10-CM

## 2022-02-17 DIAGNOSIS — U09.9 POST-COVID CHRONIC FATIGUE: ICD-10-CM

## 2022-02-17 PROCEDURE — 3008F BODY MASS INDEX DOCD: CPT | Mod: CPTII,S$GLB,, | Performed by: INTERNAL MEDICINE

## 2022-02-17 PROCEDURE — 1160F PR REVIEW ALL MEDS BY PRESCRIBER/CLIN PHARMACIST DOCUMENTED: ICD-10-PCS | Mod: CPTII,S$GLB,, | Performed by: INTERNAL MEDICINE

## 2022-02-17 PROCEDURE — 3008F PR BODY MASS INDEX (BMI) DOCUMENTED: ICD-10-PCS | Mod: CPTII,S$GLB,, | Performed by: INTERNAL MEDICINE

## 2022-02-17 PROCEDURE — 3079F PR MOST RECENT DIASTOLIC BLOOD PRESSURE 80-89 MM HG: ICD-10-PCS | Mod: CPTII,S$GLB,, | Performed by: INTERNAL MEDICINE

## 2022-02-17 PROCEDURE — 1160F RVW MEDS BY RX/DR IN RCRD: CPT | Mod: CPTII,S$GLB,, | Performed by: INTERNAL MEDICINE

## 2022-02-17 PROCEDURE — 99999 PR PBB SHADOW E&M-EST. PATIENT-LVL V: ICD-10-PCS | Mod: PBBFAC,,, | Performed by: INTERNAL MEDICINE

## 2022-02-17 PROCEDURE — 3079F DIAST BP 80-89 MM HG: CPT | Mod: CPTII,S$GLB,, | Performed by: INTERNAL MEDICINE

## 2022-02-17 PROCEDURE — 3075F PR MOST RECENT SYSTOLIC BLOOD PRESS GE 130-139MM HG: ICD-10-PCS | Mod: CPTII,S$GLB,, | Performed by: INTERNAL MEDICINE

## 2022-02-17 PROCEDURE — 3075F SYST BP GE 130 - 139MM HG: CPT | Mod: CPTII,S$GLB,, | Performed by: INTERNAL MEDICINE

## 2022-02-17 PROCEDURE — 1159F MED LIST DOCD IN RCRD: CPT | Mod: CPTII,S$GLB,, | Performed by: INTERNAL MEDICINE

## 2022-02-17 PROCEDURE — 99999 PR PBB SHADOW E&M-EST. PATIENT-LVL V: CPT | Mod: PBBFAC,,, | Performed by: INTERNAL MEDICINE

## 2022-02-17 PROCEDURE — 99214 OFFICE O/P EST MOD 30 MIN: CPT | Mod: S$GLB,,, | Performed by: INTERNAL MEDICINE

## 2022-02-17 PROCEDURE — 99214 PR OFFICE/OUTPT VISIT, EST, LEVL IV, 30-39 MIN: ICD-10-PCS | Mod: S$GLB,,, | Performed by: INTERNAL MEDICINE

## 2022-02-17 PROCEDURE — 1159F PR MEDICATION LIST DOCUMENTED IN MEDICAL RECORD: ICD-10-PCS | Mod: CPTII,S$GLB,, | Performed by: INTERNAL MEDICINE

## 2022-02-17 SDOH — SOCIAL DETERMINANTS OF HEALTH (SDOH): PROBLEMS RELATED TO EDUCATION AND LITERACY, UNSPECIFIED: Z55.9

## 2022-02-17 ASSESSMENT — ROUTINE ASSESSMENT OF PATIENT INDEX DATA (RAPID3)
TOTAL RAPID3 SCORE: 3.22
PAIN SCORE: 2
AM STIFFNESS SCORE: 1, YES
FATIGUE SCORE: 2.5
PSYCHOLOGICAL DISTRESS SCORE: 3.3
MDHAQ FUNCTION SCORE: 0.8
PATIENT GLOBAL ASSESSMENT SCORE: 5

## 2022-02-17 NOTE — PATIENT INSTRUCTIONS
Try Paraffin wax baths for hands.    Begin a program of low impact, dedicated, aerobic exercise.    Weight loss: do not eat for at least 4 hrs before going to bed.      Patient Education       Ganglion Cyst   The Basics   [facility] the [facility] and [facility]   What is a ganglion cyst? -- A ganglion cyst is a small sac of fluid that forms over a joint or tendon.  Common places for a ganglion cyst are:  · Top of the wrist  · Finger joints  · Top of the foot  Ganglion cysts can also form on the knee, shoulder, back, or other parts of the body.  What are the symptoms of a ganglion cyst? -- The symptoms include:  · Swelling  · Pain  · Trouble moving a joint  Is there a test for ganglion cyst? -- Yes. If you have a bump that looks like a ganglion cyst, the doctor or nurse will probably be able to tell what it is just by doing an exam. They might also shine a powerful light into it. If light passes through, that means the bump is filled with fluid. This tells the doctor it could be a ganglion cyst.  If the doctor or nurse is not sure what is causing your symptoms, they might order an imaging test such as an MRI or an ultrasound. Imaging tests create pictures of the inside of the body.  How is a ganglion cyst treated? -- Some ganglion cysts go away without any treatment. Your doctor or nurse might wait to see if the cyst goes away on its own.  If you do get treatment, the doctor or nurse might:  · Drain the cyst - Doctors can stick a needle into a cyst and take out the fluid.   · Do surgery - The doctor might take out the cyst and fix any damaged tissue nearby.  What if my symptoms do not get better? -- If your symptoms do not get better, talk with your doctor or nurse. If you have been waiting to see if the ganglion cyst goes away without treatment, you might need treatment.  All topics are updated as new evidence becomes available and our peer review process is complete.  This topic retrieved from [employer] on:  Sep 21, 2021.  Topic 68476 Version 7.0  Release: 29.4.2 - C29.263  © 2021 UpToDate, Inc. and/or its affiliates. All rights reserved.  Consumer Information Use and Disclaimer   This information is not specific medical advice and does not replace information you receive from your health care provider. This is only a brief summary of general information. It does NOT include all information about conditions, illnesses, injuries, tests, procedures, treatments, therapies, discharge instructions or life-style choices that may apply to you. You must talk with your health care provider for complete information about your health and treatment options. This information should not be used to decide whether or not to accept your health care provider's advice, instructions or recommendations. Only your health care provider has the knowledge and training to provide advice that is right for you. The use of this information is governed by the [employer] End User License Agreement, available at https://www.emotion.me.TheOfficialBoard/en/solutions/OutTrippin/about/makayla.The use of [employer] content is governed by the [employer] Terms of Use. ©2021 UpToDate, Inc. All rights reserved.  Copyright   © 2021 UpToDate, Inc. and/or its affiliates. All rights reserved.

## 2022-02-21 ENCOUNTER — TELEPHONE (OUTPATIENT)
Dept: RHEUMATOLOGY | Facility: CLINIC | Age: 47
End: 2022-02-21
Payer: COMMERCIAL

## 2022-02-21 NOTE — TELEPHONE ENCOUNTER
"Returned patient's call.  Patient states he understands what "educational  Sources" is that is noted on his after visit summary--stated after review he realized that means discussion about his diagnosis.  He states there is no need for Dr. Robison to return his call.  "

## 2022-03-18 ENCOUNTER — PATIENT MESSAGE (OUTPATIENT)
Dept: ADMINISTRATIVE | Facility: HOSPITAL | Age: 47
End: 2022-03-18
Payer: COMMERCIAL

## 2022-04-12 NOTE — TELEPHONE ENCOUNTER
Refill Routing Note   Medication(s) are not appropriate for processing by [facility] for the following reason(s):      - Patient has not been seen in over 15 months by PCP    [facility] action(s):  Defer          Medication reconciliation completed: No     Appointments  past 12m or future 3m with PCP    Date Provider   Last Visit   3/22/2021 Edis Alexander Jr., MD   Next Visit   Visit date not found Edis Alexander Jr., MD   ED visits in past 90 days: 0        Note composed:4:22 PM 04/12/2022

## 2022-04-18 RX ORDER — METFORMIN HYDROCHLORIDE 500 MG/1
500 TABLET ORAL 2 TIMES DAILY WITH MEALS
Qty: 60 TABLET | Refills: 0 | Status: SHIPPED | OUTPATIENT
Start: 2022-04-18 | End: 2022-07-10

## 2022-04-25 NOTE — TELEPHONE ENCOUNTER
No new care gaps identified.  Powered by [employer]. Reference number: [id number].   4/25/2022 5:13:44 PM CDT

## 2022-04-26 RX ORDER — BLOOD-GLUCOSE METER
EACH MISCELLANEOUS
Qty: 300 EACH | Refills: 3 | Status: SHIPPED | OUTPATIENT
Start: 2022-04-26

## 2022-04-26 RX ORDER — LANCETS 33 GAUGE
EACH MISCELLANEOUS
Qty: 300 EACH | Refills: 3 | Status: SHIPPED | OUTPATIENT
Start: 2022-04-26

## 2022-04-26 NOTE — TELEPHONE ENCOUNTER
Refill Authorization Note   Deepak Tobias  is requesting a refill authorization.  Brief Assessment and Rationale for Refill:  Approve     Medication Therapy Plan:       Medication Reconciliation Completed: No   Comments:     No Care Gaps recommended.     Note composed:5:30 PM 04/26/2022

## 2022-05-03 NOTE — PROGRESS NOTES
"CC: left knee pain    46 y.o. Male presents today for his 1st Euflexxa injection of his left knee.     Attempted treatments: ibuprofen 400mg  Pain score: 0/10  History of trauma/injury: none since last visit   Affecting ADLs: mild     REVIEW OF SYSTEMS:   Constitution: Patient denies fever or chills.  Eyes: Patient denies eye pain or vision changes.  HEENT: Patient denies ear pain, sore throat, or nasal discharge.  CVS: Patient denies chest pain.  Lungs: Patient denies shortness of breath or cough.  Skin: Patient denies skin rash or itching.    Musculoskeletal: Patient denies recent falls. See HPI.  Psych: Patient denies any current anxiety or nervousness.    PAST MEDICAL HISTORY:   Past Medical History:   Diagnosis Date    Diabetes mellitus     Hyperlipidemia     Hypertension     Obesity     Sleep apnea        MEDICATIONS:     Current Outpatient Medications:     amLODIPine (NORVASC) 10 MG tablet, TAKE ONE TABLET BY MOUTH DAILY, Disp: 90 tablet, Rfl: 2    clindamycin-tretinoin (ZIANA) gel, , Disp: , Rfl:     clobetasoL (TEMOVATE) 0.05 % cream, Apply to affected area on right hand twice daily during flares. Use for two weeks at a time. Do no apply to face, underarms, or groin., Disp: 30 g, Rfl: 2    insulin (LANTUS SOLOSTAR U-100 INSULIN) glargine 100 units/mL (3mL) SubQ pen, Inject 10 Units into the skin every evening., Disp: 1 Box, Rfl: 15    lancets Misc, 1 lancet by Misc.(Non-Drug; Combo Route) route 3 (three) times daily with meals., Disp: 300 each, Rfl: 11    metFORMIN (GLUCOPHAGE) 500 MG tablet, Take 1 tablet (500 mg total) by mouth 2 (two) times daily with meals., Disp: 60 tablet, Rfl: 0    ONETOUCH DELICA PLUS LANCET 33 gauge Misc, TEST 3 TIMES A DAY WITH MEALS, Disp: 300 each, Rfl: 3    ONETOUCH VERIO TEST STRIPS Strp, TEST 3 TIMES A DAY WITH MEALS, Disp: 300 each, Rfl: 3    pen needle, diabetic (NOVOFINE PLUS) 32 gauge x 1/6" Ndle, 1 pen by Misc.(Non-Drug; Combo Route) route 3 (three) times " "daily., Disp: 100 each, Rfl: 15    pravastatin (PRAVACHOL) 20 MG tablet, TAKE ONE TABLET BY MOUTH DAILY, Disp: 90 tablet, Rfl: 2    valsartan-hydrochlorothiazide (DIOVAN-HCT) 320-25 mg per tablet, TAKE 1 TABLET BY MOUTH ONCE DAILY. , Disp: 90 tablet, Rfl: 3    albuterol (PROAIR HFA) 90 mcg/actuation inhaler, Inhale 2 puffs into the lungs every 6 (six) hours as needed for Wheezing or Shortness of Breath. Rescue, Disp: 18 g, Rfl: 2    blood-glucose meter (ONETOUCH VERIO SYSTEM) Misc, 1 Units by Misc.(Non-Drug; Combo Route) route once daily., Disp: 1 each, Rfl: 0    lancing device Misc, 1 Device by Misc.(Non-Drug; Combo Route) route 2 (two) times daily with meals., Disp: 1 each, Rfl: 0    ALLERGIES:   Review of patient's allergies indicates:   Allergen Reactions    Keflex [cephalexin] Rash        PHYSICAL EXAMINATION:  BP (!) 141/90   Ht 5' 8" (1.727 m)   Wt 129.3 kg (285 lb 0.9 oz)   BMI 43.34 kg/m²   There are no signs of infection at the injection site, including no rubor, calor, or skin lesions.  Gen: NAD.  Psych: Affect & judgment nl.  Neuro: Grossly CNI. DANIEL.  HEENT: -Trach dev. -Eye d/c. -Rhinorrhea.  CV: Color nl. -E/C/C. WWPx4.  Pulm: -Dyspnea. -Cough.  Lymph: -Edema.  Int: -Rash/lesion noted. Skin is warm and dry    ASSESSMENT:      ICD-10-CM ICD-9-CM   1. Chronic pain of left knee  M25.562 719.46    G89.29 338.29   2. Primary osteoarthritis of left knee  M17.12 715.16         PLAN:  Ultrasound-guided injection of the left knee with euflexxa 1 of 3 performed at visit today.    Future planning includes - Continue exercise program    Risks and benefits were discussed with patient prior to receiving injection.  Depending on injection type, risks include the possibility of infection, pain, disruptions in blood pressure and blood sugar, and cosmetic deformity at site of injection.    All questions were answered to the best of my ability and all concerns were addressed at this time.    Follow up in 1 " week(s) for above, or sooner if needed.      This note is dictated using the M*Modal Fluency Direct word recognition program. There are word recognition mistakes that are occasionally missed on review.

## 2022-05-03 NOTE — PROGRESS NOTES
"CC: left knee pain    46 y.o. Male presents today for his 2nd Euflexxa injection of his left knee.     Attempted treatments: none since last visit   Pain score: 1/10  History of trauma/injury: none since last visit   Affecting ADLs: "slightly"     REVIEW OF SYSTEMS:   Constitution: Patient denies fever or chills.  Eyes: Patient denies eye pain or vision changes.  HEENT: Patient denies ear pain, sore throat, or nasal discharge.  CVS: Patient denies chest pain.  Lungs: Patient denies shortness of breath or cough.  Skin: Patient denies skin rash or itching.    Musculoskeletal: Patient denies recent falls. See HPI.  Psych: Patient denies any current anxiety or nervousness.    PAST MEDICAL HISTORY:   Past Medical History:   Diagnosis Date    Diabetes mellitus     Hyperlipidemia     Hypertension     Obesity     Sleep apnea        MEDICATIONS:     Current Outpatient Medications:     amLODIPine (NORVASC) 10 MG tablet, TAKE ONE TABLET BY MOUTH DAILY, Disp: 90 tablet, Rfl: 2    clindamycin-tretinoin (ZIANA) gel, , Disp: , Rfl:     clobetasoL (TEMOVATE) 0.05 % cream, Apply to affected area on right hand twice daily during flares. Use for two weeks at a time. Do no apply to face, underarms, or groin., Disp: 30 g, Rfl: 2    insulin (LANTUS SOLOSTAR U-100 INSULIN) glargine 100 units/mL (3mL) SubQ pen, Inject 10 Units into the skin every evening., Disp: 1 Box, Rfl: 15    lancets Misc, 1 lancet by Misc.(Non-Drug; Combo Route) route 3 (three) times daily with meals., Disp: 300 each, Rfl: 11    metFORMIN (GLUCOPHAGE) 500 MG tablet, Take 1 tablet (500 mg total) by mouth 2 (two) times daily with meals., Disp: 60 tablet, Rfl: 0    ONETOUCH DELICA PLUS LANCET 33 gauge Misc, TEST 3 TIMES A DAY WITH MEALS, Disp: 300 each, Rfl: 3    ONETOUCH VERIO TEST STRIPS Strp, TEST 3 TIMES A DAY WITH MEALS, Disp: 300 each, Rfl: 3    pen needle, diabetic (NOVOFINE PLUS) 32 gauge x 1/6" Ndle, 1 pen by Misc.(Non-Drug; Combo Route) route 3 " "(three) times daily., Disp: 100 each, Rfl: 15    pravastatin (PRAVACHOL) 20 MG tablet, TAKE ONE TABLET BY MOUTH DAILY, Disp: 90 tablet, Rfl: 2    semaglutide (OZEMPIC) 0.25 mg or 0.5 mg(2 mg/1.5 mL) pen injector, Inject 0.5 mg into the skin every 7 days., Disp: 1 pen, Rfl: 11    spironolactone (ALDACTONE) 25 MG tablet, Take 1 tablet (25 mg total) by mouth once daily., Disp: 90 tablet, Rfl: 1    valsartan-hydrochlorothiazide (DIOVAN-HCT) 320-25 mg per tablet, TAKE 1 TABLET BY MOUTH ONCE DAILY. , Disp: 90 tablet, Rfl: 3    albuterol (PROAIR HFA) 90 mcg/actuation inhaler, Inhale 2 puffs into the lungs every 6 (six) hours as needed for Wheezing or Shortness of Breath. Rescue, Disp: 18 g, Rfl: 2    blood-glucose meter (ONETOUCH VERIO SYSTEM) Misc, 1 Units by Misc.(Non-Drug; Combo Route) route once daily., Disp: 1 each, Rfl: 0    lancing device Misc, 1 Device by Misc.(Non-Drug; Combo Route) route 2 (two) times daily with meals., Disp: 1 each, Rfl: 0    ALLERGIES:   Review of patient's allergies indicates:   Allergen Reactions    Keflex [cephalexin] Rash        PHYSICAL EXAMINATION:  BP (!) 149/95   Ht 5' 8" (1.727 m)   Wt 126.1 kg (278 lb)   BMI 42.27 kg/m²   There are no signs of infection at the injection site, including no rubor, calor, or skin lesions.  Gen: NAD.  Psych: Affect & judgment nl.  Neuro: Grossly CNI. DANIEL.  HEENT: -Trach dev. -Eye d/c. -Rhinorrhea.  CV: Color nl. -E/C/C. WWPx4.  Pulm: -Dyspnea. -Cough.  Lymph: -Edema.  Int: -Rash/lesion noted. Skin is warm and dry    ASSESSMENT:      ICD-10-CM ICD-9-CM   1. Primary osteoarthritis of left knee  M17.12 715.16   2. Elevated blood pressure reading  R03.0 796.2         PLAN:  Ultrasound-guided injection of the left knee with Euflexxa 2 of 3 performed at visit today.    Future planning includes - Continue exercise program    Risks and benefits were discussed with patient prior to receiving injection.  Depending on injection type, risks include the " possibility of infection, pain, disruptions in blood pressure and blood sugar, and cosmetic deformity at site of injection.    All questions were answered to the best of my ability and all concerns were addressed at this time.    Follow up in 1 week(s) for above, or sooner if needed.      This note is dictated using the M*Modal Fluency Direct word recognition program. There are word recognition mistakes that are occasionally missed on review.

## 2022-05-04 ENCOUNTER — OFFICE VISIT (OUTPATIENT)
Dept: SPORTS MEDICINE | Facility: CLINIC | Age: 47
End: 2022-05-04
Payer: COMMERCIAL

## 2022-05-04 VITALS
SYSTOLIC BLOOD PRESSURE: 141 MMHG | DIASTOLIC BLOOD PRESSURE: 90 MMHG | WEIGHT: 285.06 LBS | HEIGHT: 68 IN | BODY MASS INDEX: 43.2 KG/M2

## 2022-05-04 DIAGNOSIS — M17.12 PRIMARY OSTEOARTHRITIS OF LEFT KNEE: ICD-10-CM

## 2022-05-04 DIAGNOSIS — M25.562 CHRONIC PAIN OF LEFT KNEE: Primary | ICD-10-CM

## 2022-05-04 DIAGNOSIS — G89.29 CHRONIC PAIN OF LEFT KNEE: Primary | ICD-10-CM

## 2022-05-04 PROCEDURE — 20611 DRAIN/INJ JOINT/BURSA W/US: CPT | Mod: LT,S$GLB,, | Performed by: STUDENT IN AN ORGANIZED HEALTH CARE EDUCATION/TRAINING PROGRAM

## 2022-05-04 PROCEDURE — 99999 PR PBB SHADOW E&M-EST. PATIENT-LVL III: ICD-10-PCS | Mod: PBBFAC,,, | Performed by: STUDENT IN AN ORGANIZED HEALTH CARE EDUCATION/TRAINING PROGRAM

## 2022-05-04 PROCEDURE — 3077F SYST BP >= 140 MM HG: CPT | Mod: CPTII,S$GLB,, | Performed by: STUDENT IN AN ORGANIZED HEALTH CARE EDUCATION/TRAINING PROGRAM

## 2022-05-04 PROCEDURE — 1159F MED LIST DOCD IN RCRD: CPT | Mod: CPTII,S$GLB,, | Performed by: STUDENT IN AN ORGANIZED HEALTH CARE EDUCATION/TRAINING PROGRAM

## 2022-05-04 PROCEDURE — 1159F PR MEDICATION LIST DOCUMENTED IN MEDICAL RECORD: ICD-10-PCS | Mod: CPTII,S$GLB,, | Performed by: STUDENT IN AN ORGANIZED HEALTH CARE EDUCATION/TRAINING PROGRAM

## 2022-05-04 PROCEDURE — 3077F PR MOST RECENT SYSTOLIC BLOOD PRESSURE >= 140 MM HG: ICD-10-PCS | Mod: CPTII,S$GLB,, | Performed by: STUDENT IN AN ORGANIZED HEALTH CARE EDUCATION/TRAINING PROGRAM

## 2022-05-04 PROCEDURE — 1160F PR REVIEW ALL MEDS BY PRESCRIBER/CLIN PHARMACIST DOCUMENTED: ICD-10-PCS | Mod: CPTII,S$GLB,, | Performed by: STUDENT IN AN ORGANIZED HEALTH CARE EDUCATION/TRAINING PROGRAM

## 2022-05-04 PROCEDURE — 3008F PR BODY MASS INDEX (BMI) DOCUMENTED: ICD-10-PCS | Mod: CPTII,S$GLB,, | Performed by: STUDENT IN AN ORGANIZED HEALTH CARE EDUCATION/TRAINING PROGRAM

## 2022-05-04 PROCEDURE — 99999 PR PBB SHADOW E&M-EST. PATIENT-LVL III: CPT | Mod: PBBFAC,,, | Performed by: STUDENT IN AN ORGANIZED HEALTH CARE EDUCATION/TRAINING PROGRAM

## 2022-05-04 PROCEDURE — 1160F RVW MEDS BY RX/DR IN RCRD: CPT | Mod: CPTII,S$GLB,, | Performed by: STUDENT IN AN ORGANIZED HEALTH CARE EDUCATION/TRAINING PROGRAM

## 2022-05-04 PROCEDURE — 3080F PR MOST RECENT DIASTOLIC BLOOD PRESSURE >= 90 MM HG: ICD-10-PCS | Mod: CPTII,S$GLB,, | Performed by: STUDENT IN AN ORGANIZED HEALTH CARE EDUCATION/TRAINING PROGRAM

## 2022-05-04 PROCEDURE — 20611 LARGE JOINT ASPIRATION/INJECTION: L KNEE: ICD-10-PCS | Mod: LT,S$GLB,, | Performed by: STUDENT IN AN ORGANIZED HEALTH CARE EDUCATION/TRAINING PROGRAM

## 2022-05-04 PROCEDURE — 99499 UNLISTED E&M SERVICE: CPT | Mod: S$GLB,,, | Performed by: STUDENT IN AN ORGANIZED HEALTH CARE EDUCATION/TRAINING PROGRAM

## 2022-05-04 PROCEDURE — 3008F BODY MASS INDEX DOCD: CPT | Mod: CPTII,S$GLB,, | Performed by: STUDENT IN AN ORGANIZED HEALTH CARE EDUCATION/TRAINING PROGRAM

## 2022-05-04 PROCEDURE — 99499 NO LOS: ICD-10-PCS | Mod: S$GLB,,, | Performed by: STUDENT IN AN ORGANIZED HEALTH CARE EDUCATION/TRAINING PROGRAM

## 2022-05-04 PROCEDURE — 3080F DIAST BP >= 90 MM HG: CPT | Mod: CPTII,S$GLB,, | Performed by: STUDENT IN AN ORGANIZED HEALTH CARE EDUCATION/TRAINING PROGRAM

## 2022-05-04 NOTE — PROCEDURES
Large Joint Aspiration/Injection: L knee    Date/Time: 5/4/2022 11:00 AM  Performed by: Yessi Petersen MD  Authorized by: Yessi Petersen MD     Consent Done?:  Yes (Verbal)  Indications:  Arthritis and pain  Site marked: the procedure site was marked    Timeout: prior to procedure the correct patient, procedure, and site was verified    Prep: patient was prepped and draped in usual sterile fashion      Local anesthesia used?: Yes    Anesthesia:  Local infiltration  Local anesthetic:  Co-phenylcaine spray    Details:  Needle Size:  22 G  Ultrasonic Guidance for needle placement?: Yes (Ultrasound guidance used to avoid neurovascular injury and/or to improve accuracy given body habitus.)    Images are saved and documented.  Approach: Superolateral.  Location:  Knee  Site:  L knee  Medications:  10 mg sodium hyaluronate (EUFLEXXA) 10 mg/mL(mw 2.4 -3.6 million)  Medications comment:  Ropivacaine 0.2% 2mL  Patient tolerance:  Patient tolerated the procedure well with no immediate complications     TECHNIQUE: Real time ultrasound examination of the left knee(s) was performed with SonPicanova Edge 2, 9-L MHz linear probe(s). Ultrasound guidance was used for needle localization. Images were saved and stored for documentation. Dynamic visualization of the needle was continuous throughout the procedures and maintained in good position.

## 2022-05-04 NOTE — PATIENT INSTRUCTIONS
You had a hyaluronic acid (gel) injection today. There are two medicines that were injected into your knee, a numbing medicine (local anesthetic) and the hyaluronic acid. The numbing medication component usually takes effect within a few minutes and wears off after a few hours, after which your pain may return. The gel medication typically takes effect in 4-6 weeks, although some people have benefits sooner.    There are risks with this procedure.  Any time the skin is punctured with a needle, there is a small risk of infection. With these injections that risk is less than 1 and 14,000. Less than 1% of people experience of pain flare with the hyaluronic acid injection (pseudoseptic reaction)  which usually goes away in 2-3 days or may require further intervention.

## 2022-05-05 ENCOUNTER — TELEPHONE (OUTPATIENT)
Dept: INTERNAL MEDICINE | Facility: CLINIC | Age: 47
End: 2022-05-05
Payer: COMMERCIAL

## 2022-05-05 ENCOUNTER — OFFICE VISIT (OUTPATIENT)
Dept: INTERNAL MEDICINE | Facility: CLINIC | Age: 47
End: 2022-05-05
Payer: COMMERCIAL

## 2022-05-05 ENCOUNTER — LAB VISIT (OUTPATIENT)
Dept: LAB | Facility: HOSPITAL | Age: 47
End: 2022-05-05
Attending: INTERNAL MEDICINE
Payer: COMMERCIAL

## 2022-05-05 VITALS
SYSTOLIC BLOOD PRESSURE: 138 MMHG | OXYGEN SATURATION: 98 % | DIASTOLIC BLOOD PRESSURE: 94 MMHG | HEIGHT: 68 IN | HEART RATE: 60 BPM | BODY MASS INDEX: 42.13 KG/M2 | WEIGHT: 278 LBS

## 2022-05-05 DIAGNOSIS — Z12.11 COLON CANCER SCREENING: ICD-10-CM

## 2022-05-05 DIAGNOSIS — I10 PRIMARY HYPERTENSION: ICD-10-CM

## 2022-05-05 DIAGNOSIS — G47.30 SLEEP APNEA, UNSPECIFIED TYPE: ICD-10-CM

## 2022-05-05 DIAGNOSIS — E11.65 TYPE 2 DIABETES MELLITUS WITH HYPERGLYCEMIA, WITHOUT LONG-TERM CURRENT USE OF INSULIN: Primary | ICD-10-CM

## 2022-05-05 DIAGNOSIS — E11.65 TYPE 2 DIABETES MELLITUS WITH HYPERGLYCEMIA, WITHOUT LONG-TERM CURRENT USE OF INSULIN: ICD-10-CM

## 2022-05-05 DIAGNOSIS — E78.5 HYPERLIPIDEMIA, UNSPECIFIED HYPERLIPIDEMIA TYPE: ICD-10-CM

## 2022-05-05 DIAGNOSIS — E66.01 CLASS 3 SEVERE OBESITY WITH SERIOUS COMORBIDITY AND BODY MASS INDEX (BMI) OF 40.0 TO 44.9 IN ADULT, UNSPECIFIED OBESITY TYPE: ICD-10-CM

## 2022-05-05 LAB
ANION GAP SERPL CALC-SCNC: 9 MMOL/L (ref 8–16)
BUN SERPL-MCNC: 18 MG/DL (ref 6–20)
CALCIUM SERPL-MCNC: 9.7 MG/DL (ref 8.7–10.5)
CHLORIDE SERPL-SCNC: 105 MMOL/L (ref 95–110)
CO2 SERPL-SCNC: 26 MMOL/L (ref 23–29)
CREAT SERPL-MCNC: 1.2 MG/DL (ref 0.5–1.4)
EST. GFR  (AFRICAN AMERICAN): >60 ML/MIN/1.73 M^2
EST. GFR  (NON AFRICAN AMERICAN): >60 ML/MIN/1.73 M^2
ESTIMATED AVG GLUCOSE: 120 MG/DL (ref 68–131)
GLUCOSE SERPL-MCNC: 132 MG/DL (ref 70–110)
HBA1C MFR BLD: 5.8 % (ref 4–5.6)
POTASSIUM SERPL-SCNC: 3.6 MMOL/L (ref 3.5–5.1)
SODIUM SERPL-SCNC: 140 MMOL/L (ref 136–145)

## 2022-05-05 PROCEDURE — 3075F PR MOST RECENT SYSTOLIC BLOOD PRESS GE 130-139MM HG: ICD-10-PCS | Mod: CPTII,S$GLB,, | Performed by: INTERNAL MEDICINE

## 2022-05-05 PROCEDURE — 99214 PR OFFICE/OUTPT VISIT, EST, LEVL IV, 30-39 MIN: ICD-10-PCS | Mod: S$GLB,,, | Performed by: INTERNAL MEDICINE

## 2022-05-05 PROCEDURE — 99214 OFFICE O/P EST MOD 30 MIN: CPT | Mod: S$GLB,,, | Performed by: INTERNAL MEDICINE

## 2022-05-05 PROCEDURE — 3080F PR MOST RECENT DIASTOLIC BLOOD PRESSURE >= 90 MM HG: ICD-10-PCS | Mod: CPTII,S$GLB,, | Performed by: INTERNAL MEDICINE

## 2022-05-05 PROCEDURE — 80048 BASIC METABOLIC PNL TOTAL CA: CPT | Performed by: INTERNAL MEDICINE

## 2022-05-05 PROCEDURE — 99999 PR PBB SHADOW E&M-EST. PATIENT-LVL IV: ICD-10-PCS | Mod: PBBFAC,,, | Performed by: INTERNAL MEDICINE

## 2022-05-05 PROCEDURE — 3008F BODY MASS INDEX DOCD: CPT | Mod: CPTII,S$GLB,, | Performed by: INTERNAL MEDICINE

## 2022-05-05 PROCEDURE — 99999 PR PBB SHADOW E&M-EST. PATIENT-LVL IV: CPT | Mod: PBBFAC,,, | Performed by: INTERNAL MEDICINE

## 2022-05-05 PROCEDURE — 36415 COLL VENOUS BLD VENIPUNCTURE: CPT | Performed by: INTERNAL MEDICINE

## 2022-05-05 PROCEDURE — 83036 HEMOGLOBIN GLYCOSYLATED A1C: CPT | Performed by: INTERNAL MEDICINE

## 2022-05-05 PROCEDURE — 1159F MED LIST DOCD IN RCRD: CPT | Mod: CPTII,S$GLB,, | Performed by: INTERNAL MEDICINE

## 2022-05-05 PROCEDURE — 3080F DIAST BP >= 90 MM HG: CPT | Mod: CPTII,S$GLB,, | Performed by: INTERNAL MEDICINE

## 2022-05-05 PROCEDURE — 3075F SYST BP GE 130 - 139MM HG: CPT | Mod: CPTII,S$GLB,, | Performed by: INTERNAL MEDICINE

## 2022-05-05 PROCEDURE — 1159F PR MEDICATION LIST DOCUMENTED IN MEDICAL RECORD: ICD-10-PCS | Mod: CPTII,S$GLB,, | Performed by: INTERNAL MEDICINE

## 2022-05-05 PROCEDURE — 3008F PR BODY MASS INDEX (BMI) DOCUMENTED: ICD-10-PCS | Mod: CPTII,S$GLB,, | Performed by: INTERNAL MEDICINE

## 2022-05-05 RX ORDER — SPIRONOLACTONE 25 MG/1
25 TABLET ORAL DAILY
Qty: 90 TABLET | Refills: 1 | Status: SHIPPED | OUTPATIENT
Start: 2022-05-05 | End: 2022-11-01

## 2022-05-05 NOTE — PROGRESS NOTES
Answers for HPI/ROS submitted by the patient on 5/4/2022  activity change: No  unexpected weight change: No  neck pain: No  hearing loss: No  rhinorrhea: No  trouble swallowing: No  eye discharge: No  visual disturbance: No  chest tightness: No  wheezing: No  chest pain: No  palpitations: No  blood in stool: No  constipation: No  vomiting: No  diarrhea: No  polydipsia: No  polyuria: No  difficulty urinating: No  urgency: No  hematuria: No  joint swelling: No  arthralgias: No  headaches: No  weakness: No  confusion: No  dysphoric mood: No      History of Present Illness:  Mr. Deepak Tobias is a 46 y.o. male here for his annual exam.            He reports left knee pain. It began last year.  He saw ortho and it is getting better after shots.  .      Patient states worsening stiffness and pain in hands, elbows and shoulders. It has been occurring for a few years.     Hyperlipidemia:  He is taking pravastatin.  He has had hyperlipidemia for several years.  Recent labs (11/2021) showed good control.       Hypertension: Present for multiple years.  He is on Valsartan/HCTZ 320-25mg and amlodipine 10mg.  Blood pressure   today is 138/94, well controlled.       Obesity:  He is weighing 272 today.  BMI is 41.4. He is regaining weight he lost when he had COVID due to inactivity secondary to knee pain.       DM: Last hemoglobin A1c was 5.5 .  He is on metformin and Lantus 10 units a day.      Sleep apnea: He uses CPAP without any difficulty.  Using it well- not problem.       ROS : Gen - no fatigue or significant weight change  Eyes - no eye pain or visual changes  ENT - no hoarseness or sore throat  CV - No chest pain or SOB.  NO palpitations.  Pulm - no cough or wheezing  GI - no N/V/D   no dysuria or incontinence  MS - no joint pain or muscle pain  Skin - no rash, or c/o of skin lesions  Neuro - no HA, dizziness--- memory is doing well.   Heme - no abnormal bleeding or bruising  Endo - no polydipsia, or temperature  "changes  Psych - no anxiety or depression     OBJECTIVE:      Vital Signs:  BP (!) 138/94 (BP Location: Left arm, Patient Position: Sitting, BP Method: Large (Manual))   Pulse 60   Ht 5' 8" (1.727 m)   Wt 126.1 kg (278 lb)   SpO2 98%   BMI 42.27 kg/m²                 Physical Exam  he is a morbidly obese 45-year-old gentleman.  His mood is good.  Constitutional: He is oriented to person, place, and time and well-developed, well-nourished, and in no distress.   HENT:   Head: Normocephalic and atraumatic.   Eyes: EOM are normal.   Neck: Normal range of motion.   Cardiovascular: Normal rate, regular rhythm, normal heart sounds and intact distal pulses.   Pulmonary/Chest: Effort normal and breath sounds normal. He has no wheezes. He has no rales.   Abdominal: Soft. Bowel sounds are normal. There is no abdominal tenderness.   Musculoskeletal:         General: No edema.      Comments:  Today he is not having any complaints of any joints.  He does have crepitus in both knees though.  Neurological: He is alert and oriented to person, place, and time.   Skin: Skin is warm and dry.   Psychiatric: Mood normal.        Protective Sensation (w/ 10 gram monofilament):  Right: Intact  Left: Intact    Visual Inspection:  Normal -  Bilateral       Pedal Pulses:   Right: Present  Left: Present    Posterior tibialis:   Right:Present  Left: Present          ASSESSMENT & PLAN:   Hypertension       -      not well  controlled, continue current regimen and add spirolactone and folwl upin 1 month      Hyperlipidemia       -      Continue pravastatin     Type 2 diabetes mellitus with hyperglycemia, without long-term current use of insulin  -     Continue lantus and metformin--we are going to check a chemistry hemoglobin A1c on him.                 obesity -- discussed diet and exercise as tolerated.  will try to start ozempic- will discuss                    "

## 2022-05-11 ENCOUNTER — OFFICE VISIT (OUTPATIENT)
Dept: SPORTS MEDICINE | Facility: CLINIC | Age: 47
End: 2022-05-11
Payer: COMMERCIAL

## 2022-05-11 VITALS
SYSTOLIC BLOOD PRESSURE: 149 MMHG | BODY MASS INDEX: 42.13 KG/M2 | DIASTOLIC BLOOD PRESSURE: 95 MMHG | WEIGHT: 278 LBS | HEIGHT: 68 IN

## 2022-05-11 DIAGNOSIS — M17.12 PRIMARY OSTEOARTHRITIS OF LEFT KNEE: Primary | ICD-10-CM

## 2022-05-11 DIAGNOSIS — R03.0 ELEVATED BLOOD PRESSURE READING: ICD-10-CM

## 2022-05-11 PROCEDURE — 3044F HG A1C LEVEL LT 7.0%: CPT | Mod: CPTII,S$GLB,, | Performed by: STUDENT IN AN ORGANIZED HEALTH CARE EDUCATION/TRAINING PROGRAM

## 2022-05-11 PROCEDURE — 3077F SYST BP >= 140 MM HG: CPT | Mod: CPTII,S$GLB,, | Performed by: STUDENT IN AN ORGANIZED HEALTH CARE EDUCATION/TRAINING PROGRAM

## 2022-05-11 PROCEDURE — 1159F PR MEDICATION LIST DOCUMENTED IN MEDICAL RECORD: ICD-10-PCS | Mod: CPTII,S$GLB,, | Performed by: STUDENT IN AN ORGANIZED HEALTH CARE EDUCATION/TRAINING PROGRAM

## 2022-05-11 PROCEDURE — 3077F PR MOST RECENT SYSTOLIC BLOOD PRESSURE >= 140 MM HG: ICD-10-PCS | Mod: CPTII,S$GLB,, | Performed by: STUDENT IN AN ORGANIZED HEALTH CARE EDUCATION/TRAINING PROGRAM

## 2022-05-11 PROCEDURE — 99999 PR PBB SHADOW E&M-EST. PATIENT-LVL IV: ICD-10-PCS | Mod: PBBFAC,,, | Performed by: STUDENT IN AN ORGANIZED HEALTH CARE EDUCATION/TRAINING PROGRAM

## 2022-05-11 PROCEDURE — 3080F PR MOST RECENT DIASTOLIC BLOOD PRESSURE >= 90 MM HG: ICD-10-PCS | Mod: CPTII,S$GLB,, | Performed by: STUDENT IN AN ORGANIZED HEALTH CARE EDUCATION/TRAINING PROGRAM

## 2022-05-11 PROCEDURE — 99499 NO LOS: ICD-10-PCS | Mod: S$GLB,,, | Performed by: STUDENT IN AN ORGANIZED HEALTH CARE EDUCATION/TRAINING PROGRAM

## 2022-05-11 PROCEDURE — 1160F PR REVIEW ALL MEDS BY PRESCRIBER/CLIN PHARMACIST DOCUMENTED: ICD-10-PCS | Mod: CPTII,S$GLB,, | Performed by: STUDENT IN AN ORGANIZED HEALTH CARE EDUCATION/TRAINING PROGRAM

## 2022-05-11 PROCEDURE — 3080F DIAST BP >= 90 MM HG: CPT | Mod: CPTII,S$GLB,, | Performed by: STUDENT IN AN ORGANIZED HEALTH CARE EDUCATION/TRAINING PROGRAM

## 2022-05-11 PROCEDURE — 3044F PR MOST RECENT HEMOGLOBIN A1C LEVEL <7.0%: ICD-10-PCS | Mod: CPTII,S$GLB,, | Performed by: STUDENT IN AN ORGANIZED HEALTH CARE EDUCATION/TRAINING PROGRAM

## 2022-05-11 PROCEDURE — 1159F MED LIST DOCD IN RCRD: CPT | Mod: CPTII,S$GLB,, | Performed by: STUDENT IN AN ORGANIZED HEALTH CARE EDUCATION/TRAINING PROGRAM

## 2022-05-11 PROCEDURE — 99499 UNLISTED E&M SERVICE: CPT | Mod: S$GLB,,, | Performed by: STUDENT IN AN ORGANIZED HEALTH CARE EDUCATION/TRAINING PROGRAM

## 2022-05-11 PROCEDURE — 3008F PR BODY MASS INDEX (BMI) DOCUMENTED: ICD-10-PCS | Mod: CPTII,S$GLB,, | Performed by: STUDENT IN AN ORGANIZED HEALTH CARE EDUCATION/TRAINING PROGRAM

## 2022-05-11 PROCEDURE — 1125F PR PAIN SEVERITY QUANTIFIED, PAIN PRESENT: ICD-10-PCS | Mod: CPTII,S$GLB,, | Performed by: STUDENT IN AN ORGANIZED HEALTH CARE EDUCATION/TRAINING PROGRAM

## 2022-05-11 PROCEDURE — 3008F BODY MASS INDEX DOCD: CPT | Mod: CPTII,S$GLB,, | Performed by: STUDENT IN AN ORGANIZED HEALTH CARE EDUCATION/TRAINING PROGRAM

## 2022-05-11 PROCEDURE — 1160F RVW MEDS BY RX/DR IN RCRD: CPT | Mod: CPTII,S$GLB,, | Performed by: STUDENT IN AN ORGANIZED HEALTH CARE EDUCATION/TRAINING PROGRAM

## 2022-05-11 PROCEDURE — 99999 PR PBB SHADOW E&M-EST. PATIENT-LVL IV: CPT | Mod: PBBFAC,,, | Performed by: STUDENT IN AN ORGANIZED HEALTH CARE EDUCATION/TRAINING PROGRAM

## 2022-05-11 PROCEDURE — 20611 LARGE JOINT ASPIRATION/INJECTION: L KNEE: ICD-10-PCS | Mod: LT,S$GLB,, | Performed by: STUDENT IN AN ORGANIZED HEALTH CARE EDUCATION/TRAINING PROGRAM

## 2022-05-11 PROCEDURE — 20611 DRAIN/INJ JOINT/BURSA W/US: CPT | Mod: LT,S$GLB,, | Performed by: STUDENT IN AN ORGANIZED HEALTH CARE EDUCATION/TRAINING PROGRAM

## 2022-05-11 PROCEDURE — 1125F AMNT PAIN NOTED PAIN PRSNT: CPT | Mod: CPTII,S$GLB,, | Performed by: STUDENT IN AN ORGANIZED HEALTH CARE EDUCATION/TRAINING PROGRAM

## 2022-05-11 NOTE — PROGRESS NOTES
"CC: left knee pain    46 y.o. Male presents today for his 3rd Euflexxa injection of his left knee.     Attempted treatments: none since last visit   Pain score: 3/10  History of trauma/injury: none since last visit   Affecting ADLs: yes      REVIEW OF SYSTEMS:   Constitution: Patient denies fever or chills.  Eyes: Patient denies eye pain or vision changes.  HEENT: Patient denies ear pain, sore throat, or nasal discharge.  CVS: Patient denies chest pain.  Lungs: Patient denies shortness of breath or cough.  Skin: Patient denies skin rash or itching.    Musculoskeletal: Patient denies recent falls. See HPI.  Psych: Patient denies any current anxiety or nervousness.    PAST MEDICAL HISTORY:   Past Medical History:   Diagnosis Date    Diabetes mellitus     Hyperlipidemia     Hypertension     Obesity     Sleep apnea        MEDICATIONS:     Current Outpatient Medications:     amLODIPine (NORVASC) 10 MG tablet, TAKE ONE TABLET BY MOUTH DAILY, Disp: 90 tablet, Rfl: 2    clindamycin-tretinoin (ZIANA) gel, , Disp: , Rfl:     clobetasoL (TEMOVATE) 0.05 % cream, Apply to affected area on right hand twice daily during flares. Use for two weeks at a time. Do no apply to face, underarms, or groin., Disp: 30 g, Rfl: 2    insulin (LANTUS SOLOSTAR U-100 INSULIN) glargine 100 units/mL (3mL) SubQ pen, Inject 10 Units into the skin every evening., Disp: 1 Box, Rfl: 15    lancets Misc, 1 lancet by Misc.(Non-Drug; Combo Route) route 3 (three) times daily with meals., Disp: 300 each, Rfl: 11    metFORMIN (GLUCOPHAGE) 500 MG tablet, Take 1 tablet (500 mg total) by mouth 2 (two) times daily with meals., Disp: 60 tablet, Rfl: 0    ONETOUCH DELICA PLUS LANCET 33 gauge Misc, TEST 3 TIMES A DAY WITH MEALS, Disp: 300 each, Rfl: 3    ONETOUCH VERIO TEST STRIPS Strp, TEST 3 TIMES A DAY WITH MEALS, Disp: 300 each, Rfl: 3    pen needle, diabetic (NOVOFINE PLUS) 32 gauge x 1/6" Ndle, 1 pen by Misc.(Non-Drug; Combo Route) route 3 (three) " "times daily., Disp: 100 each, Rfl: 15    pravastatin (PRAVACHOL) 20 MG tablet, TAKE ONE TABLET BY MOUTH DAILY, Disp: 90 tablet, Rfl: 2    semaglutide (OZEMPIC) 0.25 mg or 0.5 mg(2 mg/1.5 mL) pen injector, Inject 0.5 mg into the skin every 7 days., Disp: 1 pen, Rfl: 11    spironolactone (ALDACTONE) 25 MG tablet, Take 1 tablet (25 mg total) by mouth once daily., Disp: 90 tablet, Rfl: 1    valsartan-hydrochlorothiazide (DIOVAN-HCT) 320-25 mg per tablet, TAKE 1 TABLET BY MOUTH ONCE DAILY. , Disp: 90 tablet, Rfl: 3    albuterol (PROAIR HFA) 90 mcg/actuation inhaler, Inhale 2 puffs into the lungs every 6 (six) hours as needed for Wheezing or Shortness of Breath. Rescue, Disp: 18 g, Rfl: 2    blood-glucose meter (ONETOUCH VERIO SYSTEM) Misc, 1 Units by Misc.(Non-Drug; Combo Route) route once daily., Disp: 1 each, Rfl: 0    lancing device Misc, 1 Device by Misc.(Non-Drug; Combo Route) route 2 (two) times daily with meals., Disp: 1 each, Rfl: 0    ALLERGIES:   Review of patient's allergies indicates:   Allergen Reactions    Keflex [cephalexin] Rash        PHYSICAL EXAMINATION:  BP (!) 136/93   Ht 5' 8" (1.727 m)   Wt 126.1 kg (278 lb)   BMI 42.27 kg/m²   There are no signs of infection at the injection site, including no rubor, calor, or skin lesions.  Gen: NAD.  Psych: Affect & judgment nl.  Neuro: Grossly CNI. DANIEL.  HEENT: -Trach dev. -Eye d/c. -Rhinorrhea.  CV: Color nl. -E/C/C. WWPx4.  Pulm: -Dyspnea. -Cough.  Lymph: -Edema.  Int: -Rash/lesion noted. Skin is warm and dry    ASSESSMENT:      ICD-10-CM ICD-9-CM   1. Primary osteoarthritis of left knee  M17.12 715.16   2. Elevated blood pressure reading  R03.0 796.2   3. Class 3 severe obesity with body mass index (BMI) of 40.0 to 44.9 in adult, unspecified obesity type, unspecified whether serious comorbidity present  E66.01 278.01    Z68.41 V85.41         PLAN:  Ultrasound-guided injection of the left knee with Euflexxa 3 of 3 performed at visit " today.    Future planning includes - Continue exercise program    Risks and benefits were discussed with patient prior to receiving injection.  Depending on injection type, risks include the possibility of infection, pain, disruptions in blood pressure and blood sugar, and cosmetic deformity at site of injection.    All questions were answered to the best of my ability and all concerns were addressed at this time.    Follow up in 6 week(s) for above, or sooner if needed.      This note is dictated using the M*Modal Fluency Direct word recognition program. There are word recognition mistakes that are occasionally missed on review.

## 2022-05-11 NOTE — PROCEDURES
Large Joint Aspiration/Injection: L knee    Date/Time: 5/11/2022 11:00 AM  Performed by: Yessi Petersen MD  Authorized by: Yessi Petersen MD     Consent Done?:  Yes (Verbal)  Indications:  Arthritis and pain  Site marked: the procedure site was marked    Timeout: prior to procedure the correct patient, procedure, and site was verified    Prep: patient was prepped and draped in usual sterile fashion      Local anesthesia used?: Yes    Anesthesia:  Local infiltration  Local anesthetic:  Co-phenylcaine spray    Details:  Needle Size:  22 G  Ultrasonic Guidance for needle placement?: Yes (Ultrasound guidance used to avoid neurovascular injury and/or to improve accuracy given body habitus.)    Images are saved and documented.  Approach: Superolateral.  Location:  Knee  Site:  L knee  Medications:  10 mg sodium hyaluronate (EUFLEXXA) 10 mg/mL(mw 2.4 -3.6 million)  Medications comment:  Ropivacaine 0.2% 2mL  Patient tolerance:  Patient tolerated the procedure well with no immediate complications     TECHNIQUE: Real time ultrasound examination of the left knee(s) was performed with SonTweet Category Edge 2, 9-L MHz linear probe(s). Ultrasound guidance was used for needle localization. Images were saved and stored for documentation. Dynamic visualization of the needle was continuous throughout the procedures and maintained in good position.

## 2022-05-12 DIAGNOSIS — Z12.11 COLON CANCER SCREENING: Primary | ICD-10-CM

## 2022-05-18 ENCOUNTER — OFFICE VISIT (OUTPATIENT)
Dept: SPORTS MEDICINE | Facility: CLINIC | Age: 47
End: 2022-05-18
Payer: COMMERCIAL

## 2022-05-18 VITALS
DIASTOLIC BLOOD PRESSURE: 93 MMHG | BODY MASS INDEX: 42.13 KG/M2 | HEIGHT: 68 IN | WEIGHT: 278 LBS | SYSTOLIC BLOOD PRESSURE: 136 MMHG

## 2022-05-18 DIAGNOSIS — E66.01 CLASS 3 SEVERE OBESITY WITH BODY MASS INDEX (BMI) OF 40.0 TO 44.9 IN ADULT, UNSPECIFIED OBESITY TYPE, UNSPECIFIED WHETHER SERIOUS COMORBIDITY PRESENT: ICD-10-CM

## 2022-05-18 DIAGNOSIS — M17.12 PRIMARY OSTEOARTHRITIS OF LEFT KNEE: Primary | ICD-10-CM

## 2022-05-18 DIAGNOSIS — R03.0 ELEVATED BLOOD PRESSURE READING: ICD-10-CM

## 2022-05-18 PROCEDURE — 3044F PR MOST RECENT HEMOGLOBIN A1C LEVEL <7.0%: ICD-10-PCS | Mod: CPTII,S$GLB,, | Performed by: STUDENT IN AN ORGANIZED HEALTH CARE EDUCATION/TRAINING PROGRAM

## 2022-05-18 PROCEDURE — 20611 DRAIN/INJ JOINT/BURSA W/US: CPT | Mod: LT,S$GLB,, | Performed by: STUDENT IN AN ORGANIZED HEALTH CARE EDUCATION/TRAINING PROGRAM

## 2022-05-18 PROCEDURE — 99499 UNLISTED E&M SERVICE: CPT | Mod: S$GLB,,, | Performed by: STUDENT IN AN ORGANIZED HEALTH CARE EDUCATION/TRAINING PROGRAM

## 2022-05-18 PROCEDURE — 1125F PR PAIN SEVERITY QUANTIFIED, PAIN PRESENT: ICD-10-PCS | Mod: CPTII,S$GLB,, | Performed by: STUDENT IN AN ORGANIZED HEALTH CARE EDUCATION/TRAINING PROGRAM

## 2022-05-18 PROCEDURE — 1159F MED LIST DOCD IN RCRD: CPT | Mod: CPTII,S$GLB,, | Performed by: STUDENT IN AN ORGANIZED HEALTH CARE EDUCATION/TRAINING PROGRAM

## 2022-05-18 PROCEDURE — 3044F HG A1C LEVEL LT 7.0%: CPT | Mod: CPTII,S$GLB,, | Performed by: STUDENT IN AN ORGANIZED HEALTH CARE EDUCATION/TRAINING PROGRAM

## 2022-05-18 PROCEDURE — 3080F PR MOST RECENT DIASTOLIC BLOOD PRESSURE >= 90 MM HG: ICD-10-PCS | Mod: CPTII,S$GLB,, | Performed by: STUDENT IN AN ORGANIZED HEALTH CARE EDUCATION/TRAINING PROGRAM

## 2022-05-18 PROCEDURE — 1159F PR MEDICATION LIST DOCUMENTED IN MEDICAL RECORD: ICD-10-PCS | Mod: CPTII,S$GLB,, | Performed by: STUDENT IN AN ORGANIZED HEALTH CARE EDUCATION/TRAINING PROGRAM

## 2022-05-18 PROCEDURE — 1125F AMNT PAIN NOTED PAIN PRSNT: CPT | Mod: CPTII,S$GLB,, | Performed by: STUDENT IN AN ORGANIZED HEALTH CARE EDUCATION/TRAINING PROGRAM

## 2022-05-18 PROCEDURE — 3080F DIAST BP >= 90 MM HG: CPT | Mod: CPTII,S$GLB,, | Performed by: STUDENT IN AN ORGANIZED HEALTH CARE EDUCATION/TRAINING PROGRAM

## 2022-05-18 PROCEDURE — 99999 PR PBB SHADOW E&M-EST. PATIENT-LVL IV: ICD-10-PCS | Mod: PBBFAC,,, | Performed by: STUDENT IN AN ORGANIZED HEALTH CARE EDUCATION/TRAINING PROGRAM

## 2022-05-18 PROCEDURE — 3008F BODY MASS INDEX DOCD: CPT | Mod: CPTII,S$GLB,, | Performed by: STUDENT IN AN ORGANIZED HEALTH CARE EDUCATION/TRAINING PROGRAM

## 2022-05-18 PROCEDURE — 3075F PR MOST RECENT SYSTOLIC BLOOD PRESS GE 130-139MM HG: ICD-10-PCS | Mod: CPTII,S$GLB,, | Performed by: STUDENT IN AN ORGANIZED HEALTH CARE EDUCATION/TRAINING PROGRAM

## 2022-05-18 PROCEDURE — 1160F PR REVIEW ALL MEDS BY PRESCRIBER/CLIN PHARMACIST DOCUMENTED: ICD-10-PCS | Mod: CPTII,S$GLB,, | Performed by: STUDENT IN AN ORGANIZED HEALTH CARE EDUCATION/TRAINING PROGRAM

## 2022-05-18 PROCEDURE — 99499 NO LOS: ICD-10-PCS | Mod: S$GLB,,, | Performed by: STUDENT IN AN ORGANIZED HEALTH CARE EDUCATION/TRAINING PROGRAM

## 2022-05-18 PROCEDURE — 1160F RVW MEDS BY RX/DR IN RCRD: CPT | Mod: CPTII,S$GLB,, | Performed by: STUDENT IN AN ORGANIZED HEALTH CARE EDUCATION/TRAINING PROGRAM

## 2022-05-18 PROCEDURE — 20611 LARGE JOINT ASPIRATION/INJECTION: L KNEE: ICD-10-PCS | Mod: LT,S$GLB,, | Performed by: STUDENT IN AN ORGANIZED HEALTH CARE EDUCATION/TRAINING PROGRAM

## 2022-05-18 PROCEDURE — 3008F PR BODY MASS INDEX (BMI) DOCUMENTED: ICD-10-PCS | Mod: CPTII,S$GLB,, | Performed by: STUDENT IN AN ORGANIZED HEALTH CARE EDUCATION/TRAINING PROGRAM

## 2022-05-18 PROCEDURE — 99999 PR PBB SHADOW E&M-EST. PATIENT-LVL IV: CPT | Mod: PBBFAC,,, | Performed by: STUDENT IN AN ORGANIZED HEALTH CARE EDUCATION/TRAINING PROGRAM

## 2022-05-18 PROCEDURE — 3075F SYST BP GE 130 - 139MM HG: CPT | Mod: CPTII,S$GLB,, | Performed by: STUDENT IN AN ORGANIZED HEALTH CARE EDUCATION/TRAINING PROGRAM

## 2022-05-18 NOTE — PROCEDURES
Large Joint Aspiration/Injection: L knee    Date/Time: 5/18/2022 11:00 AM  Performed by: Yessi Petersen MD  Authorized by: Yessi Petersen MD     Consent Done?:  Yes (Verbal)  Indications:  Arthritis and pain  Site marked: the procedure site was marked    Timeout: prior to procedure the correct patient, procedure, and site was verified    Prep: patient was prepped and draped in usual sterile fashion      Local anesthesia used?: Yes    Anesthesia:  Local infiltration  Local anesthetic:  Co-phenylcaine spray    Details:  Needle Size:  22 G  Ultrasonic Guidance for needle placement?: Yes (Ultrasound guidance used to avoid neurovascular injury and/or to improve accuracy given body habitus.)    Images are saved and documented.  Approach: Superolateral.  Location:  Knee  Site:  L knee  Medications:  10 mg sodium hyaluronate (EUFLEXXA) 10 mg/mL(mw 2.4 -3.6 million)  Medications comment:  Ropivacaine 0.2% 2mL  Patient tolerance:  Patient tolerated the procedure well with no immediate complications     TECHNIQUE: Real time ultrasound examination of the left knee(s) was performed with SonVouchedFor Edge 2, 9-L MHz linear probe(s). Ultrasound guidance was used for needle localization. Images were saved and stored for documentation. Dynamic visualization of the needle was continuous throughout the procedures and maintained in good position.

## 2022-05-19 ENCOUNTER — TELEPHONE (OUTPATIENT)
Dept: ADMINISTRATIVE | Facility: HOSPITAL | Age: 47
End: 2022-05-19
Payer: COMMERCIAL

## 2022-05-19 ENCOUNTER — PATIENT MESSAGE (OUTPATIENT)
Dept: ADMINISTRATIVE | Facility: HOSPITAL | Age: 47
End: 2022-05-19
Payer: COMMERCIAL

## 2022-05-25 ENCOUNTER — DOCUMENTATION ONLY (OUTPATIENT)
Dept: ADMINISTRATIVE | Facility: HOSPITAL | Age: 47
End: 2022-05-25
Payer: COMMERCIAL

## 2022-05-29 NOTE — TELEPHONE ENCOUNTER
No new care gaps identified.  [facility] Embedded Care Gaps. Reference number: [id number]. 5/29/2022   10:25:06 AM SHANAE

## 2022-05-30 RX ORDER — VALSARTAN AND HYDROCHLOROTHIAZIDE 320; 25 MG/1; MG/1
1 TABLET, FILM COATED ORAL DAILY
Qty: 90 TABLET | Refills: 3 | Status: SHIPPED | OUTPATIENT
Start: 2022-05-30 | End: 2023-06-05 | Stop reason: SDUPTHER

## 2022-05-30 NOTE — TELEPHONE ENCOUNTER
Refill Routing Note   Medication(s) are not appropriate for processing by [facility] for the following reason(s):      - Required vitals are abnormal    [facility] action(s):  Defer          Medication reconciliation completed: No     Appointments  past 12m or future 3m with PCP    Date Provider   Last Visit   5/5/2022 Edis Alexander Jr., MD   Next Visit   6/27/2022 Edis Alexander Jr., MD   ED visits in past 90 days: 0        Note composed:6:43 AM 05/30/2022

## 2022-06-01 DIAGNOSIS — E11.9 TYPE 2 DIABETES MELLITUS WITHOUT COMPLICATION, UNSPECIFIED WHETHER LONG TERM INSULIN USE: ICD-10-CM

## 2022-06-13 NOTE — TELEPHONE ENCOUNTER
No new care gaps identified.  [facility] Embedded Care Gaps. Reference number: [id number]. 6/13/2022   12:46:44 PM CDT

## 2022-06-14 RX ORDER — PEN NEEDLE, DIABETIC 32 GX 1/4"
NEEDLE, DISPOSABLE MISCELLANEOUS
Qty: 100 EACH | Refills: 0 | Status: SHIPPED | OUTPATIENT
Start: 2022-06-14 | End: 2022-12-27

## 2022-06-14 NOTE — TELEPHONE ENCOUNTER
Refill Routing Note   Medication(s) are not appropriate for processing by [facility] for the following reason(s):      - Medication not active on medication list    [facility] action(s):  Defer          Medication reconciliation completed: No     Appointments  past 12m or future 3m with PCP    Date Provider   Last Visit   5/5/2022 Edis Alexander Jr., MD   Next Visit   6/27/2022 Edis Alexander Jr., MD   ED visits in past 90 days: 0        Note composed:11:21 AM 06/14/2022

## 2022-06-16 DIAGNOSIS — E11.65 TYPE 2 DIABETES MELLITUS WITH HYPERGLYCEMIA, WITHOUT LONG-TERM CURRENT USE OF INSULIN: Primary | ICD-10-CM

## 2022-06-16 RX ORDER — INSULIN GLARGINE 100 [IU]/ML
10 INJECTION, SOLUTION SUBCUTANEOUS NIGHTLY
Qty: 1 EACH | Refills: 15 | Status: CANCELLED | OUTPATIENT
Start: 2022-06-16

## 2022-06-17 RX ORDER — INSULIN GLARGINE-YFGN 100 [IU]/ML
10 INJECTION, SOLUTION SUBCUTANEOUS NIGHTLY
Qty: 27 ML | Refills: 3 | Status: SHIPPED | OUTPATIENT
Start: 2022-06-17 | End: 2022-06-27

## 2022-06-17 NOTE — TELEPHONE ENCOUNTER
No new care gaps identified.  [facility] Embedded Care Gaps. Reference number: [id number]. 6/16/2022   7:59:24 PM CDT

## 2022-06-27 ENCOUNTER — OFFICE VISIT (OUTPATIENT)
Dept: INTERNAL MEDICINE | Facility: CLINIC | Age: 47
End: 2022-06-27
Payer: COMMERCIAL

## 2022-06-27 VITALS
HEART RATE: 56 BPM | DIASTOLIC BLOOD PRESSURE: 80 MMHG | BODY MASS INDEX: 40.5 KG/M2 | OXYGEN SATURATION: 98 % | SYSTOLIC BLOOD PRESSURE: 116 MMHG | WEIGHT: 267.19 LBS | HEIGHT: 68 IN

## 2022-06-27 DIAGNOSIS — I10 PRIMARY HYPERTENSION: ICD-10-CM

## 2022-06-27 DIAGNOSIS — E66.01 CLASS 3 SEVERE OBESITY WITH SERIOUS COMORBIDITY AND BODY MASS INDEX (BMI) OF 40.0 TO 44.9 IN ADULT, UNSPECIFIED OBESITY TYPE: ICD-10-CM

## 2022-06-27 DIAGNOSIS — E11.65 TYPE 2 DIABETES MELLITUS WITH HYPERGLYCEMIA, WITHOUT LONG-TERM CURRENT USE OF INSULIN: Primary | ICD-10-CM

## 2022-06-27 PROCEDURE — 3074F PR MOST RECENT SYSTOLIC BLOOD PRESSURE < 130 MM HG: ICD-10-PCS | Mod: CPTII,S$GLB,, | Performed by: INTERNAL MEDICINE

## 2022-06-27 PROCEDURE — 99214 PR OFFICE/OUTPT VISIT, EST, LEVL IV, 30-39 MIN: ICD-10-PCS | Mod: S$GLB,,, | Performed by: INTERNAL MEDICINE

## 2022-06-27 PROCEDURE — 99999 PR PBB SHADOW E&M-EST. PATIENT-LVL IV: ICD-10-PCS | Mod: PBBFAC,,, | Performed by: INTERNAL MEDICINE

## 2022-06-27 PROCEDURE — 3079F PR MOST RECENT DIASTOLIC BLOOD PRESSURE 80-89 MM HG: ICD-10-PCS | Mod: CPTII,S$GLB,, | Performed by: INTERNAL MEDICINE

## 2022-06-27 PROCEDURE — 3044F HG A1C LEVEL LT 7.0%: CPT | Mod: CPTII,S$GLB,, | Performed by: INTERNAL MEDICINE

## 2022-06-27 PROCEDURE — 1159F PR MEDICATION LIST DOCUMENTED IN MEDICAL RECORD: ICD-10-PCS | Mod: CPTII,S$GLB,, | Performed by: INTERNAL MEDICINE

## 2022-06-27 PROCEDURE — 99214 OFFICE O/P EST MOD 30 MIN: CPT | Mod: S$GLB,,, | Performed by: INTERNAL MEDICINE

## 2022-06-27 PROCEDURE — 3074F SYST BP LT 130 MM HG: CPT | Mod: CPTII,S$GLB,, | Performed by: INTERNAL MEDICINE

## 2022-06-27 PROCEDURE — 3008F PR BODY MASS INDEX (BMI) DOCUMENTED: ICD-10-PCS | Mod: CPTII,S$GLB,, | Performed by: INTERNAL MEDICINE

## 2022-06-27 PROCEDURE — 99999 PR PBB SHADOW E&M-EST. PATIENT-LVL IV: CPT | Mod: PBBFAC,,, | Performed by: INTERNAL MEDICINE

## 2022-06-27 PROCEDURE — 3008F BODY MASS INDEX DOCD: CPT | Mod: CPTII,S$GLB,, | Performed by: INTERNAL MEDICINE

## 2022-06-27 PROCEDURE — 3079F DIAST BP 80-89 MM HG: CPT | Mod: CPTII,S$GLB,, | Performed by: INTERNAL MEDICINE

## 2022-06-27 PROCEDURE — 1159F MED LIST DOCD IN RCRD: CPT | Mod: CPTII,S$GLB,, | Performed by: INTERNAL MEDICINE

## 2022-06-27 PROCEDURE — 3044F PR MOST RECENT HEMOGLOBIN A1C LEVEL <7.0%: ICD-10-PCS | Mod: CPTII,S$GLB,, | Performed by: INTERNAL MEDICINE

## 2022-06-27 RX ORDER — AMLODIPINE BESYLATE 10 MG/1
10 TABLET ORAL DAILY
Qty: 90 TABLET | Refills: 2 | Status: SHIPPED | OUTPATIENT
Start: 2022-06-27 | End: 2023-06-05 | Stop reason: SDUPTHER

## 2022-06-27 RX ORDER — SEMAGLUTIDE 1.34 MG/ML
1 INJECTION, SOLUTION SUBCUTANEOUS
Qty: 1 PEN | Refills: 11 | Status: SHIPPED | OUTPATIENT
Start: 2022-06-27 | End: 2023-05-23 | Stop reason: SDUPTHER

## 2022-06-27 NOTE — PROGRESS NOTES
"  History of Present Illness:  Mr. Deepak Tobias is a 47  y.o. male here for his annual exam.            He reports left knee pain. It began last year.  He saw ortho and it is getting better after shots.  .      Patient states worsening stiffness and pain in hands, elbows and shoulders. It has been occurring for a few years.     Hyperlipidemia:  He is taking pravastatin.  He has had hyperlipidemia for several years.  Recent labs (11/2021) showed good control.       Hypertension: Present for multiple years.  He is on Valsartan/HCTZ 320-25mg and amlodipine 10mg.  Blood pressure   today is 138/94, well controlled.       Obesity:  He is weighing 267 today.  BMI is 40.6. He is regaining weight he lost when he had COVID due to inactivity secondary to knee pain.       DM: Last hemoglobin A1c was 5.8  .  He is on metformin and Lantus 10 units a day.      Sleep apnea: He uses CPAP without any difficulty.  Using it well- not problem.       ROS : Gen - no fatigue or significant weight change  Eyes - no eye pain or visual changes  ENT - no hoarseness or sore throat  CV - No chest pain or SOB.  NO palpitations.  Pulm - no cough or wheezing  GI - no N/V/D   no dysuria or incontinence  MS - no joint pain or muscle pain  Skin - no rash, or c/o of skin lesions  Neuro - no HA, dizziness--- memory is doing well.   Heme - no abnormal bleeding or bruising  Endo - no polydipsia, or temperature changes  Psych - no anxiety or depression     OBJECTIVE:      Vital Signs:      /80   Pulse (!) 56   Ht 5' 8" (1.727 m)   Wt 121.2 kg (267 lb 3.2 oz)   SpO2 98%   BMI 40.63 kg/m²           Physical Exam  he is a morbidly obese 47-- year-old gentleman.  His mood is good.  Constitutional: He is oriented to person, place, and time and well-developed, well-nourished, and in no distress.   HENT:   Head: Normocephalic and atraumatic.   Eyes: EOM are normal.   Neck: Normal range of motion.   Cardiovascular: Normal rate, regular " rhythm, normal heart sounds and intact distal pulses.   Pulmonary/Chest: Effort normal and breath sounds normal. He has no wheezes. He has no rales.   Abdominal: Soft. Bowel sounds are normal. There is no abdominal tenderness.   Musculoskeletal:         General: No edema.      Comments:  Today he is not having any complaints of any joints.  He does have crepitus in both knees though.  Neurological: He is alert and oriented to person, place, and time.   Skin: Skin is warm and dry.   Psychiatric: Mood normal.         Protective Sensation (w/ 10 gram monofilament):  Right: Intact  Left: Intact     Visual Inspection:  Normal -  Bilateral         Pedal Pulses:   Right: Present  Left: Present     Posterior tibialis:   Right:Present  Left: Present           ASSESSMENT & PLAN:   Hypertension       -      not well  controlled, continue current regimen and add spirolactone and follow up in 1 month      Hyperlipidemia       -      Continue pravastatin-- check lipid panel next time      Type 2 diabetes mellitus with hyperglycemia, without long-term current use of insulin  -     we are going to stop his Lantus and increase his Ozempic.  We discussed potential side effects.  He is going to check his sugars to make sure he is not having any hyperglycemia.  We are going to work to help with some weight loss also.           obesity -- discussed diet and exercise as tolerated.  We will increase his ozempic- we also discussed that he needs to listen to his body and when he has full he can stop eating.  He should however make sure he is eating 3 meals a day but they can be smaller meals.

## 2022-07-09 NOTE — TELEPHONE ENCOUNTER
No new care gaps identified.  [facility] Embedded Care Gaps. Reference number: [id number]. 7/09/2022   10:05:04 AM SHANAE

## 2022-07-10 RX ORDER — METFORMIN HYDROCHLORIDE 500 MG/1
500 TABLET ORAL 2 TIMES DAILY WITH MEALS
Qty: 180 TABLET | Refills: 1 | Status: SHIPPED | OUTPATIENT
Start: 2022-07-10 | End: 2022-07-11

## 2022-07-10 NOTE — TELEPHONE ENCOUNTER
Refill Authorization Note     Refill Decision Note   Deepak Tobias  is requesting a refill authorization.  Brief Assessment and Rationale for Refill:        Medication Therapy Plan:       Medication Reconciliation Completed: No   Comments:     No Care Gaps recommended.     Note composed:3:59 PM 07/10/2022

## 2022-07-11 RX ORDER — METFORMIN HYDROCHLORIDE 500 MG/1
500 TABLET ORAL 2 TIMES DAILY WITH MEALS
Qty: 180 TABLET | Refills: 3 | Status: SHIPPED | OUTPATIENT
Start: 2022-07-11 | End: 2022-12-27

## 2022-07-12 ENCOUNTER — PATIENT MESSAGE (OUTPATIENT)
Dept: ADMINISTRATIVE | Facility: HOSPITAL | Age: 47
End: 2022-07-12
Payer: COMMERCIAL

## 2022-07-28 RX ORDER — PRAVASTATIN SODIUM 20 MG/1
TABLET ORAL
Qty: 90 TABLET | Refills: 1 | Status: SHIPPED | OUTPATIENT
Start: 2022-07-28 | End: 2022-08-28

## 2022-07-28 NOTE — TELEPHONE ENCOUNTER
No new care gaps identified.  [facility] Embedded Care Gaps. Reference number: [id number]. 7/28/2022   10:40:02 AM JOHNT

## 2022-07-29 NOTE — TELEPHONE ENCOUNTER
Refill Authorization Note     Refill Decision Note   Deepak Tobias  is requesting a refill authorization.  Brief Assessment and Rationale for Refill:  Approve     Medication Therapy Plan:       Medication Reconciliation Completed: No   Comments:     No Care Gaps recommended.     Note composed:9:51 PM 07/28/2022

## 2022-10-31 NOTE — TELEPHONE ENCOUNTER
Care Due:                  Date            Visit Type   Department     Provider  --------------------------------------------------------------------------------                                EP -                              PRIMARY      [facility] INTERNAL  Last Visit: 06-      CARE ([facility])   ZOFIA Alexander                              [facility]                              PRIMARY      [facility] INTERNAL  Next Visit: 12-      CARE ([facility])   ZOFIA Alexander                                                            Last  Test          Frequency    Reason                     Performed    Due Date  --------------------------------------------------------------------------------    CMP.........  12 months..  pravastatin..............  11- 11-    HBA1C.......  6 months...  metFORMIN, semaglutide...  05- 11-    Lipid Panel.  12 months..  pravastatin..............  11- 11-    [facility] Embedded Care Gaps. Reference number: [id number]. 10/31/2022   3:38:31 PM CDT

## 2022-11-01 RX ORDER — SPIRONOLACTONE 25 MG/1
25 TABLET ORAL DAILY
Qty: 90 TABLET | Refills: 1 | Status: SHIPPED | OUTPATIENT
Start: 2022-11-01 | End: 2023-09-24 | Stop reason: SDUPTHER

## 2022-11-01 NOTE — TELEPHONE ENCOUNTER
Refill Decision Note   Deepak Tobias  is requesting a refill authorization.  Brief Assessment and Rationale for Refill:  Approve     Medication Therapy Plan:       Medication Reconciliation Completed: No   Comments:     No Care Gaps recommended.     Note composed:2:36 PM 11/01/2022

## 2022-11-22 ENCOUNTER — PATIENT OUTREACH (OUTPATIENT)
Dept: ADMINISTRATIVE | Facility: HOSPITAL | Age: 47
End: 2022-11-22
Payer: COMMERCIAL

## 2022-11-22 DIAGNOSIS — E11.65 TYPE 2 DIABETES MELLITUS WITH HYPERGLYCEMIA, WITHOUT LONG-TERM CURRENT USE OF INSULIN: Primary | ICD-10-CM

## 2022-11-22 NOTE — PROGRESS NOTES
Health Maintenance Due   Topic Date Due    Colorectal Cancer Screening  Never done    Eye Exam  09/16/2021    Pneumococcal Vaccines (Age 0-64) (2 - PCV) 09/29/2021    COVID-19 Vaccine (4 - Booster for Pfizer series) 12/06/2021    Influenza Vaccine (1) 09/01/2022    Hemoglobin A1c  11/05/2022    Lipid Panel  11/16/2022    Diabetes Urine Screening  11/16/2022     Triggered LINKS. Updated Care Everywhere. Order placed for urine microalbumin; linked to upcoming scheduled lab appt on 12/20/2022. Chart review completed.

## 2022-11-25 RX ORDER — PRAVASTATIN SODIUM 20 MG/1
TABLET ORAL
Qty: 90 TABLET | Refills: 3 | Status: SHIPPED | OUTPATIENT
Start: 2022-11-25 | End: 2023-11-02 | Stop reason: SDUPTHER

## 2022-11-25 NOTE — TELEPHONE ENCOUNTER
No new care gaps identified.  [facility] Embedded Care Gaps. Reference number: [id number]. 11/25/2022   10:34:57 AM CST

## 2022-11-25 NOTE — TELEPHONE ENCOUNTER
Refill Routing Note   Medication(s) are not appropriate for processing by [facility] for the following reason(s):      - Required laboratory values are outdated    [facility] action(s):  Defer          Medication reconciliation completed: No     Appointments  past 12m or future 3m with PCP    Date Provider   Last Visit   6/27/2022 Edis Alexander Jr., MD   Next Visit   12/27/2022 Edis Alexander Jr., MD   ED visits in past 90 days: 0        Note composed:11:06 AM 11/25/2022

## 2022-12-20 ENCOUNTER — LAB VISIT (OUTPATIENT)
Dept: LAB | Facility: HOSPITAL | Age: 47
End: 2022-12-20
Attending: INTERNAL MEDICINE
Payer: COMMERCIAL

## 2022-12-20 DIAGNOSIS — E11.65 TYPE 2 DIABETES MELLITUS WITH HYPERGLYCEMIA, WITHOUT LONG-TERM CURRENT USE OF INSULIN: ICD-10-CM

## 2022-12-20 LAB
ALBUMIN SERPL BCP-MCNC: 4.1 G/DL (ref 3.5–5.2)
ALP SERPL-CCNC: 55 U/L (ref 55–135)
ALT SERPL W/O P-5'-P-CCNC: 28 U/L (ref 10–44)
ANION GAP SERPL CALC-SCNC: 12 MMOL/L (ref 8–16)
AST SERPL-CCNC: 20 U/L (ref 10–40)
BILIRUB SERPL-MCNC: 0.6 MG/DL (ref 0.1–1)
BUN SERPL-MCNC: 20 MG/DL (ref 6–20)
CALCIUM SERPL-MCNC: 9.7 MG/DL (ref 8.7–10.5)
CHLORIDE SERPL-SCNC: 105 MMOL/L (ref 95–110)
CHOLEST SERPL-MCNC: 161 MG/DL (ref 120–199)
CHOLEST/HDLC SERPL: 4 {RATIO} (ref 2–5)
CO2 SERPL-SCNC: 24 MMOL/L (ref 23–29)
CREAT SERPL-MCNC: 1.6 MG/DL (ref 0.5–1.4)
EST. GFR  (NO RACE VARIABLE): 53.1 ML/MIN/1.73 M^2
ESTIMATED AVG GLUCOSE: 88 MG/DL (ref 68–131)
GLUCOSE SERPL-MCNC: 106 MG/DL (ref 70–110)
HBA1C MFR BLD: 4.7 % (ref 4–5.6)
HDLC SERPL-MCNC: 40 MG/DL (ref 40–75)
HDLC SERPL: 24.8 % (ref 20–50)
LDLC SERPL CALC-MCNC: 97 MG/DL (ref 63–159)
NONHDLC SERPL-MCNC: 121 MG/DL
POTASSIUM SERPL-SCNC: 4.6 MMOL/L (ref 3.5–5.1)
PROT SERPL-MCNC: 7.7 G/DL (ref 6–8.4)
SODIUM SERPL-SCNC: 141 MMOL/L (ref 136–145)
TRIGL SERPL-MCNC: 120 MG/DL (ref 30–150)

## 2022-12-20 PROCEDURE — 80053 COMPREHEN METABOLIC PANEL: CPT | Performed by: INTERNAL MEDICINE

## 2022-12-20 PROCEDURE — 80061 LIPID PANEL: CPT | Performed by: INTERNAL MEDICINE

## 2022-12-20 PROCEDURE — 83036 HEMOGLOBIN GLYCOSYLATED A1C: CPT | Performed by: INTERNAL MEDICINE

## 2022-12-20 PROCEDURE — 36415 COLL VENOUS BLD VENIPUNCTURE: CPT | Mod: PN | Performed by: INTERNAL MEDICINE

## 2022-12-27 ENCOUNTER — OFFICE VISIT (OUTPATIENT)
Dept: INTERNAL MEDICINE | Facility: CLINIC | Age: 47
End: 2022-12-27
Payer: COMMERCIAL

## 2022-12-27 ENCOUNTER — LAB VISIT (OUTPATIENT)
Dept: LAB | Facility: HOSPITAL | Age: 47
End: 2022-12-27
Attending: INTERNAL MEDICINE
Payer: COMMERCIAL

## 2022-12-27 VITALS
BODY MASS INDEX: 36.42 KG/M2 | SYSTOLIC BLOOD PRESSURE: 118 MMHG | WEIGHT: 240.31 LBS | HEIGHT: 68 IN | OXYGEN SATURATION: 98 % | DIASTOLIC BLOOD PRESSURE: 80 MMHG | HEART RATE: 61 BPM

## 2022-12-27 DIAGNOSIS — I10 PRIMARY HYPERTENSION: ICD-10-CM

## 2022-12-27 DIAGNOSIS — Z12.11 COLON CANCER SCREENING: Primary | ICD-10-CM

## 2022-12-27 DIAGNOSIS — N17.9 AKI (ACUTE KIDNEY INJURY): ICD-10-CM

## 2022-12-27 DIAGNOSIS — E11.65 TYPE 2 DIABETES MELLITUS WITH HYPERGLYCEMIA, WITHOUT LONG-TERM CURRENT USE OF INSULIN: ICD-10-CM

## 2022-12-27 DIAGNOSIS — E66.01 CLASS 2 SEVERE OBESITY WITH SERIOUS COMORBIDITY AND BODY MASS INDEX (BMI) OF 36.0 TO 36.9 IN ADULT, UNSPECIFIED OBESITY TYPE: ICD-10-CM

## 2022-12-27 DIAGNOSIS — E78.5 HYPERLIPIDEMIA, UNSPECIFIED HYPERLIPIDEMIA TYPE: ICD-10-CM

## 2022-12-27 LAB
ANION GAP SERPL CALC-SCNC: 12 MMOL/L (ref 8–16)
BACTERIA #/AREA URNS AUTO: ABNORMAL /HPF
BILIRUB UR QL STRIP: NEGATIVE
BUN SERPL-MCNC: 19 MG/DL (ref 6–20)
CALCIUM SERPL-MCNC: 9.9 MG/DL (ref 8.7–10.5)
CHLORIDE SERPL-SCNC: 106 MMOL/L (ref 95–110)
CLARITY UR REFRACT.AUTO: CLEAR
CO2 SERPL-SCNC: 23 MMOL/L (ref 23–29)
COLOR UR AUTO: YELLOW
CREAT SERPL-MCNC: 1.4 MG/DL (ref 0.5–1.4)
EST. GFR  (NO RACE VARIABLE): >60 ML/MIN/1.73 M^2
GLUCOSE SERPL-MCNC: 92 MG/DL (ref 70–110)
GLUCOSE UR QL STRIP: NEGATIVE
HGB UR QL STRIP: NEGATIVE
HYALINE CASTS UR QL AUTO: 2 /LPF
KETONES UR QL STRIP: NEGATIVE
LEUKOCYTE ESTERASE UR QL STRIP: ABNORMAL
MICROSCOPIC COMMENT: ABNORMAL
NITRITE UR QL STRIP: POSITIVE
PH UR STRIP: 6 [PH] (ref 5–8)
POTASSIUM SERPL-SCNC: 4.6 MMOL/L (ref 3.5–5.1)
PROT UR QL STRIP: ABNORMAL
RBC #/AREA URNS AUTO: 3 /HPF (ref 0–4)
SODIUM SERPL-SCNC: 141 MMOL/L (ref 136–145)
SP GR UR STRIP: 1.02 (ref 1–1.03)
URN SPEC COLLECT METH UR: ABNORMAL
WBC #/AREA URNS AUTO: 64 /HPF (ref 0–5)

## 2022-12-27 PROCEDURE — 3044F PR MOST RECENT HEMOGLOBIN A1C LEVEL <7.0%: ICD-10-PCS | Mod: CPTII,S$GLB,, | Performed by: INTERNAL MEDICINE

## 2022-12-27 PROCEDURE — 3060F POS MICROALBUMINURIA REV: CPT | Mod: CPTII,S$GLB,, | Performed by: INTERNAL MEDICINE

## 2022-12-27 PROCEDURE — 3074F SYST BP LT 130 MM HG: CPT | Mod: CPTII,S$GLB,, | Performed by: INTERNAL MEDICINE

## 2022-12-27 PROCEDURE — 3079F DIAST BP 80-89 MM HG: CPT | Mod: CPTII,S$GLB,, | Performed by: INTERNAL MEDICINE

## 2022-12-27 PROCEDURE — 81001 URINALYSIS AUTO W/SCOPE: CPT | Performed by: INTERNAL MEDICINE

## 2022-12-27 PROCEDURE — 3044F HG A1C LEVEL LT 7.0%: CPT | Mod: CPTII,S$GLB,, | Performed by: INTERNAL MEDICINE

## 2022-12-27 PROCEDURE — 3060F PR POS MICROALBUMINURIA RESULT DOCUMENTED/REVIEW: ICD-10-PCS | Mod: CPTII,S$GLB,, | Performed by: INTERNAL MEDICINE

## 2022-12-27 PROCEDURE — 3066F NEPHROPATHY DOC TX: CPT | Mod: CPTII,S$GLB,, | Performed by: INTERNAL MEDICINE

## 2022-12-27 PROCEDURE — 36415 COLL VENOUS BLD VENIPUNCTURE: CPT | Performed by: INTERNAL MEDICINE

## 2022-12-27 PROCEDURE — 3074F PR MOST RECENT SYSTOLIC BLOOD PRESSURE < 130 MM HG: ICD-10-PCS | Mod: CPTII,S$GLB,, | Performed by: INTERNAL MEDICINE

## 2022-12-27 PROCEDURE — 99999 PR PBB SHADOW E&M-EST. PATIENT-LVL III: CPT | Mod: PBBFAC,,, | Performed by: INTERNAL MEDICINE

## 2022-12-27 PROCEDURE — 3008F BODY MASS INDEX DOCD: CPT | Mod: CPTII,S$GLB,, | Performed by: INTERNAL MEDICINE

## 2022-12-27 PROCEDURE — 99214 OFFICE O/P EST MOD 30 MIN: CPT | Mod: S$GLB,,, | Performed by: INTERNAL MEDICINE

## 2022-12-27 PROCEDURE — 3066F PR DOCUMENTATION OF TREATMENT FOR NEPHROPATHY: ICD-10-PCS | Mod: CPTII,S$GLB,, | Performed by: INTERNAL MEDICINE

## 2022-12-27 PROCEDURE — 80048 BASIC METABOLIC PNL TOTAL CA: CPT | Performed by: INTERNAL MEDICINE

## 2022-12-27 PROCEDURE — 3079F PR MOST RECENT DIASTOLIC BLOOD PRESSURE 80-89 MM HG: ICD-10-PCS | Mod: CPTII,S$GLB,, | Performed by: INTERNAL MEDICINE

## 2022-12-27 PROCEDURE — 99214 PR OFFICE/OUTPT VISIT, EST, LEVL IV, 30-39 MIN: ICD-10-PCS | Mod: S$GLB,,, | Performed by: INTERNAL MEDICINE

## 2022-12-27 PROCEDURE — 3008F PR BODY MASS INDEX (BMI) DOCUMENTED: ICD-10-PCS | Mod: CPTII,S$GLB,, | Performed by: INTERNAL MEDICINE

## 2022-12-27 PROCEDURE — 99999 PR PBB SHADOW E&M-EST. PATIENT-LVL III: ICD-10-PCS | Mod: PBBFAC,,, | Performed by: INTERNAL MEDICINE

## 2022-12-27 NOTE — PROGRESS NOTES
"  Mr. Deepak Tobias is a 47  y.o. male here for his annual exam. He has been running and has lost27 # since I last saw him.  He came down from a 46 inch jeans to 36 inch jeans             He reports left knee pain  last year.  He saw ortho and it is getting better after shots og the gel.       Patient states worsening stiffness and pain in hands, elbows and shoulders. It has been occurring for a few years.     Hyperlipidemia:  He is taking pravastatin.  He has had hyperlipidemia for several years.  Recent labs (12/2022) showed good control.       Hypertension: Present for multiple years.  He is on Valsartan/HCTZ 320-25mg and amlodipine 10mg.  Blood pressure   today is 118/80, well controlled.       Obesity:  He is weighing 240 today.  BMI is 36.5.   He is regaining weight he lost when he had COVID due to inactivity secondary to knee pain.       DM: Last hemoglobin A1c was 54.7  .  He is on metformin and ozemipic 1mg-- off Lantus.        Recent creatinine was 1.6 -- charly range 1.2-1.3  we did add spironolactone last visit to his bp meds--      Sleep apnea: He uses CPAP without any difficulty.  Using it well- not problem.       ROS : Gen - no fatigue-- weight loss as above .  Eyes - no eye pain or visual changes  ENT - no hoarseness or sore throat  CV - No chest pain or SOB.  NO palpitations.  Pulm - no cough or wheezing  GI - no N/V/D   no dysuria or incontinence  MS - no joint pain or muscle pain  Skin - no rash, or c/o of skin lesions  Neuro - no HA, dizziness--- memory is doing well.   Heme - no abnormal bleeding or bruising  Endo - no polydipsia, or temperature changes  Psych - no anxiety or depression     OBJECTIVE:      Vital Signs:          /80 (BP Location: Right arm, Patient Position: Sitting, BP Method: Medium (Manual))   Pulse 61   Ht 5' 8" (1.727 m)   Wt 109 kg (240 lb 4.8 oz)   SpO2 98%   BMI 36.54 kg/m²              Physical Exam  he is a morbidly obese 47-- year-old gentleman.  His " mood is good.  Constitutional: He is oriented to person, place, and time and well-developed, well-nourished, and in no distress.   HENT:   Head: Normocephalic and atraumatic.   Eyes: EOM are normal.   Neck: Normal range of motion.   Cardiovascular: Normal rate, regular rhythm, normal heart sounds and intact distal pulses.   Pulmonary/Chest: Effort normal and breath sounds normal. He has no wheezes. He has no rales.   Abdominal: Soft. Bowel sounds are normal. There is no abdominal tenderness.   Musculoskeletal:         General: No edema.      Comments:  Today he is not having any complaints of any joints.  He does have crepitus in both knees though.  Neurological: He is alert and oriented to person, place, and time.   Skin: Skin is warm and dry.   Psychiatric: Mood normal.               ASSESSMENT & PLAN:   Hypertension-- doing well.         -           Hyperlipidemia       -      Continue pravastatin-- check lipid panel next time      Type 2 diabetes mellitus with hyperglycemia, without long-term current use of insulin  -     off lantus  and   on Ozempic.   doing better  ok to d/c metformin           obesity -- discussed diet and exercise as tolerated.        NOEMY-- could be due to the aldactone-- will recheck          Check ua and repeat BMP

## 2022-12-30 ENCOUNTER — TELEPHONE (OUTPATIENT)
Dept: INTERNAL MEDICINE | Facility: CLINIC | Age: 47
End: 2022-12-30
Payer: COMMERCIAL

## 2022-12-30 RX ORDER — CIPROFLOXACIN 500 MG/1
500 TABLET ORAL 2 TIMES DAILY
Qty: 20 TABLET | Refills: 0 | Status: SHIPPED | OUTPATIENT
Start: 2022-12-30 | End: 2023-01-09

## 2022-12-30 NOTE — TELEPHONE ENCOUNTER
Please call-- his renal function looks better, but he has a uti.  I sent some antibiotics to his [place] [facility] -- take for 10 days

## 2022-12-30 NOTE — TELEPHONE ENCOUNTER
Informed pt about uti and renal function, pt verbalized understanding and will get rx from his pharmacy.     Mao ROCA

## 2023-01-11 ENCOUNTER — PATIENT OUTREACH (OUTPATIENT)
Dept: ADMINISTRATIVE | Facility: HOSPITAL | Age: 48
End: 2023-01-11
Payer: COMMERCIAL

## 2023-01-11 NOTE — PROGRESS NOTES
Health Maintenance Due   Topic Date Due    Colorectal Cancer Screening  Never done    Eye Exam  09/16/2021    Pneumococcal Vaccines (Age 0-64) (2 - PCV) 09/29/2021     Triggered LINKS and reconciled immunizations. Updated Care Everywhere. Pt has eye exam appt scheduled w/optometry on 1/25/2023. Chart review completed as part of [employer] report.

## 2023-01-20 LAB — NONINV COLON CA DNA+OCC BLD SCRN STL QL: NEGATIVE

## 2023-01-25 ENCOUNTER — OFFICE VISIT (OUTPATIENT)
Dept: OPTOMETRY | Facility: CLINIC | Age: 48
End: 2023-01-25
Payer: COMMERCIAL

## 2023-01-25 DIAGNOSIS — H04.123 DRY EYE SYNDROME OF BOTH EYES: Primary | ICD-10-CM

## 2023-01-25 DIAGNOSIS — E11.9 TYPE 2 DIABETES MELLITUS WITHOUT RETINOPATHY: ICD-10-CM

## 2023-01-25 DIAGNOSIS — H52.203 MYOPIA WITH ASTIGMATISM AND PRESBYOPIA, BILATERAL: ICD-10-CM

## 2023-01-25 DIAGNOSIS — H52.13 MYOPIA WITH ASTIGMATISM AND PRESBYOPIA, BILATERAL: ICD-10-CM

## 2023-01-25 DIAGNOSIS — H52.4 MYOPIA WITH ASTIGMATISM AND PRESBYOPIA, BILATERAL: ICD-10-CM

## 2023-01-25 PROCEDURE — 1160F PR REVIEW ALL MEDS BY PRESCRIBER/CLIN PHARMACIST DOCUMENTED: ICD-10-PCS | Mod: CPTII,S$GLB,, | Performed by: OPTOMETRIST

## 2023-01-25 PROCEDURE — 1159F PR MEDICATION LIST DOCUMENTED IN MEDICAL RECORD: ICD-10-PCS | Mod: CPTII,S$GLB,, | Performed by: OPTOMETRIST

## 2023-01-25 PROCEDURE — 92014 PR EYE EXAM, EST PATIENT,COMPREHESV: ICD-10-PCS | Mod: S$GLB,,, | Performed by: OPTOMETRIST

## 2023-01-25 PROCEDURE — 2023F PR DILATED RETINAL EXAM W/O EVID OF RETINOPATHY: ICD-10-PCS | Mod: CPTII,S$GLB,, | Performed by: OPTOMETRIST

## 2023-01-25 PROCEDURE — 92015 DETERMINE REFRACTIVE STATE: CPT | Mod: S$GLB,,, | Performed by: OPTOMETRIST

## 2023-01-25 PROCEDURE — 2023F DILAT RTA XM W/O RTNOPTHY: CPT | Mod: CPTII,S$GLB,, | Performed by: OPTOMETRIST

## 2023-01-25 PROCEDURE — 1159F MED LIST DOCD IN RCRD: CPT | Mod: CPTII,S$GLB,, | Performed by: OPTOMETRIST

## 2023-01-25 PROCEDURE — 1160F RVW MEDS BY RX/DR IN RCRD: CPT | Mod: CPTII,S$GLB,, | Performed by: OPTOMETRIST

## 2023-01-25 PROCEDURE — 99999 PR PBB SHADOW E&M-EST. PATIENT-LVL III: CPT | Mod: PBBFAC,,, | Performed by: OPTOMETRIST

## 2023-01-25 PROCEDURE — 92015 PR REFRACTION: ICD-10-PCS | Mod: S$GLB,,, | Performed by: OPTOMETRIST

## 2023-01-25 PROCEDURE — 92014 COMPRE OPH EXAM EST PT 1/>: CPT | Mod: S$GLB,,, | Performed by: OPTOMETRIST

## 2023-01-25 PROCEDURE — 99999 PR PBB SHADOW E&M-EST. PATIENT-LVL III: ICD-10-PCS | Mod: PBBFAC,,, | Performed by: OPTOMETRIST

## 2023-01-25 NOTE — PROGRESS NOTES
NATALY    ASHISH: 09/20  Chief complaint (CC): Patient is here for annual eye exam today.  Patient   has prescription glasses but he feels like they don't help now and doesn't   wear them.  Patient has to hold things farther away to read. Distance   seems okay.  Eyes feel irritated, water, photophobia and have a FBS,   especially in the morning.  Glasses? -  Contacts? -  H/o eye surgery, injections or laser: -  H/o eye injury: -  Known eye conditions? See above  Family h/o eye conditions? Mother lost her eye due to sarcoidosis  Eye gtts? -      (-) Flashes (-)  Floaters (-) Mucous   (+)  Tearing (-) Itching (-) Burning   (-) Headaches (+) Eye Pain/discomfort (+) Irritation   (-)  Redness (-) Double vision (-) Blurry vision    Diabetic? +BS 94 this morning  A1c? Hemoglobin A1C       Date                     Value               Ref Range             Status                12/20/2022               4.7                 4.0 - 5.6 %           Final                 05/05/2022               5.8 (H)             4.0 - 5.6 %           Final                 11/16/2021               5.5                 4.0 - 5.6 %           Final                    Last edited by Alessia Haywood on 1/25/2023  9:49 AM.            Assessment /Plan     For exam results, see Encounter Report.      Dry eye syndrome of both eyes  Recommend Systane Ultra or Refresh Optive BID-TID OU to aid with symptoms of dry eyes.    Myopia with astigmatism and presbyopia, bilateral  SRx released to patient. Patient educated on lens options. Normal ocular health. RTC 1 year for routine exam.     Type 2 diabetes without retinopathy, bilateral  BS control. No signs of diabetic retinopathy. Monitor with annual exam.

## 2023-05-02 ENCOUNTER — LAB VISIT (OUTPATIENT)
Dept: LAB | Facility: HOSPITAL | Age: 48
End: 2023-05-02
Attending: INTERNAL MEDICINE
Payer: COMMERCIAL

## 2023-05-02 DIAGNOSIS — E11.65 TYPE 2 DIABETES MELLITUS WITH HYPERGLYCEMIA, WITHOUT LONG-TERM CURRENT USE OF INSULIN: ICD-10-CM

## 2023-05-02 LAB
ALBUMIN SERPL BCP-MCNC: 4.1 G/DL (ref 3.5–5.2)
ALP SERPL-CCNC: 43 U/L (ref 55–135)
ALT SERPL W/O P-5'-P-CCNC: 27 U/L (ref 10–44)
ANION GAP SERPL CALC-SCNC: 6 MMOL/L (ref 8–16)
AST SERPL-CCNC: 22 U/L (ref 10–40)
BILIRUB SERPL-MCNC: 0.7 MG/DL (ref 0.1–1)
BUN SERPL-MCNC: 23 MG/DL (ref 6–20)
CALCIUM SERPL-MCNC: 9.8 MG/DL (ref 8.7–10.5)
CHLORIDE SERPL-SCNC: 105 MMOL/L (ref 95–110)
CO2 SERPL-SCNC: 28 MMOL/L (ref 23–29)
CREAT SERPL-MCNC: 1.6 MG/DL (ref 0.5–1.4)
EST. GFR  (NO RACE VARIABLE): 53.1 ML/MIN/1.73 M^2
ESTIMATED AVG GLUCOSE: 88 MG/DL (ref 68–131)
GLUCOSE SERPL-MCNC: 89 MG/DL (ref 70–110)
HBA1C MFR BLD: 4.7 % (ref 4–5.6)
POTASSIUM SERPL-SCNC: 4 MMOL/L (ref 3.5–5.1)
PROT SERPL-MCNC: 7.6 G/DL (ref 6–8.4)
SODIUM SERPL-SCNC: 139 MMOL/L (ref 136–145)

## 2023-05-02 PROCEDURE — 36415 COLL VENOUS BLD VENIPUNCTURE: CPT | Performed by: INTERNAL MEDICINE

## 2023-05-02 PROCEDURE — 80053 COMPREHEN METABOLIC PANEL: CPT | Performed by: INTERNAL MEDICINE

## 2023-05-02 PROCEDURE — 83036 HEMOGLOBIN GLYCOSYLATED A1C: CPT | Performed by: INTERNAL MEDICINE

## 2023-05-09 ENCOUNTER — OFFICE VISIT (OUTPATIENT)
Dept: INTERNAL MEDICINE | Facility: CLINIC | Age: 48
End: 2023-05-09
Payer: COMMERCIAL

## 2023-05-09 ENCOUNTER — LAB VISIT (OUTPATIENT)
Dept: LAB | Facility: HOSPITAL | Age: 48
End: 2023-05-09
Attending: INTERNAL MEDICINE
Payer: COMMERCIAL

## 2023-05-09 VITALS
BODY MASS INDEX: 34.65 KG/M2 | WEIGHT: 228.63 LBS | HEIGHT: 68 IN | OXYGEN SATURATION: 99 % | SYSTOLIC BLOOD PRESSURE: 129 MMHG | HEART RATE: 54 BPM | DIASTOLIC BLOOD PRESSURE: 79 MMHG

## 2023-05-09 DIAGNOSIS — N20.0 RENAL STONES: ICD-10-CM

## 2023-05-09 DIAGNOSIS — E66.01 CLASS 2 SEVERE OBESITY WITH SERIOUS COMORBIDITY AND BODY MASS INDEX (BMI) OF 36.0 TO 36.9 IN ADULT, UNSPECIFIED OBESITY TYPE: ICD-10-CM

## 2023-05-09 DIAGNOSIS — E11.65 TYPE 2 DIABETES MELLITUS WITH HYPERGLYCEMIA, WITHOUT LONG-TERM CURRENT USE OF INSULIN: ICD-10-CM

## 2023-05-09 DIAGNOSIS — N17.9 AKI (ACUTE KIDNEY INJURY): ICD-10-CM

## 2023-05-09 DIAGNOSIS — E78.5 HYPERLIPIDEMIA, UNSPECIFIED HYPERLIPIDEMIA TYPE: Primary | ICD-10-CM

## 2023-05-09 DIAGNOSIS — I10 PRIMARY HYPERTENSION: ICD-10-CM

## 2023-05-09 DIAGNOSIS — R39.11 URINARY HESITANCY: ICD-10-CM

## 2023-05-09 LAB
ANION GAP SERPL CALC-SCNC: 7 MMOL/L (ref 8–16)
BILIRUB UR QL STRIP: NEGATIVE
BUN SERPL-MCNC: 29 MG/DL (ref 6–20)
CALCIUM SERPL-MCNC: 10.5 MG/DL (ref 8.7–10.5)
CHLORIDE SERPL-SCNC: 106 MMOL/L (ref 95–110)
CLARITY UR REFRACT.AUTO: CLEAR
CO2 SERPL-SCNC: 28 MMOL/L (ref 23–29)
COLOR UR AUTO: YELLOW
CREAT SERPL-MCNC: 1.5 MG/DL (ref 0.5–1.4)
EST. GFR  (NO RACE VARIABLE): 57.4 ML/MIN/1.73 M^2
ESTIMATED AVG GLUCOSE: 91 MG/DL (ref 68–131)
GLUCOSE SERPL-MCNC: 98 MG/DL (ref 70–110)
GLUCOSE UR QL STRIP: NEGATIVE
HBA1C MFR BLD: 4.8 % (ref 4–5.6)
HGB UR QL STRIP: NEGATIVE
KETONES UR QL STRIP: NEGATIVE
LEUKOCYTE ESTERASE UR QL STRIP: NEGATIVE
NITRITE UR QL STRIP: NEGATIVE
PH UR STRIP: 6 [PH] (ref 5–8)
POTASSIUM SERPL-SCNC: 4.7 MMOL/L (ref 3.5–5.1)
PROT UR QL STRIP: ABNORMAL
SODIUM SERPL-SCNC: 141 MMOL/L (ref 136–145)
SP GR UR STRIP: 1.02 (ref 1–1.03)
URN SPEC COLLECT METH UR: ABNORMAL

## 2023-05-09 PROCEDURE — 99999 PR PBB SHADOW E&M-EST. PATIENT-LVL IV: ICD-10-PCS | Mod: PBBFAC,,, | Performed by: INTERNAL MEDICINE

## 2023-05-09 PROCEDURE — 80048 BASIC METABOLIC PNL TOTAL CA: CPT | Performed by: INTERNAL MEDICINE

## 2023-05-09 PROCEDURE — 99999 PR PBB SHADOW E&M-EST. PATIENT-LVL IV: CPT | Mod: PBBFAC,,, | Performed by: INTERNAL MEDICINE

## 2023-05-09 PROCEDURE — 81003 URINALYSIS AUTO W/O SCOPE: CPT | Performed by: INTERNAL MEDICINE

## 2023-05-09 PROCEDURE — 99214 OFFICE O/P EST MOD 30 MIN: CPT | Mod: S$GLB,,, | Performed by: INTERNAL MEDICINE

## 2023-05-09 PROCEDURE — 3078F PR MOST RECENT DIASTOLIC BLOOD PRESSURE < 80 MM HG: ICD-10-PCS | Mod: CPTII,S$GLB,, | Performed by: INTERNAL MEDICINE

## 2023-05-09 PROCEDURE — 99214 PR OFFICE/OUTPT VISIT, EST, LEVL IV, 30-39 MIN: ICD-10-PCS | Mod: S$GLB,,, | Performed by: INTERNAL MEDICINE

## 2023-05-09 PROCEDURE — 3074F PR MOST RECENT SYSTOLIC BLOOD PRESSURE < 130 MM HG: ICD-10-PCS | Mod: CPTII,S$GLB,, | Performed by: INTERNAL MEDICINE

## 2023-05-09 PROCEDURE — 1159F PR MEDICATION LIST DOCUMENTED IN MEDICAL RECORD: ICD-10-PCS | Mod: CPTII,S$GLB,, | Performed by: INTERNAL MEDICINE

## 2023-05-09 PROCEDURE — 36415 COLL VENOUS BLD VENIPUNCTURE: CPT | Performed by: INTERNAL MEDICINE

## 2023-05-09 PROCEDURE — 3044F PR MOST RECENT HEMOGLOBIN A1C LEVEL <7.0%: ICD-10-PCS | Mod: CPTII,S$GLB,, | Performed by: INTERNAL MEDICINE

## 2023-05-09 PROCEDURE — 3008F PR BODY MASS INDEX (BMI) DOCUMENTED: ICD-10-PCS | Mod: CPTII,S$GLB,, | Performed by: INTERNAL MEDICINE

## 2023-05-09 PROCEDURE — 1159F MED LIST DOCD IN RCRD: CPT | Mod: CPTII,S$GLB,, | Performed by: INTERNAL MEDICINE

## 2023-05-09 PROCEDURE — 3008F BODY MASS INDEX DOCD: CPT | Mod: CPTII,S$GLB,, | Performed by: INTERNAL MEDICINE

## 2023-05-09 PROCEDURE — 3044F HG A1C LEVEL LT 7.0%: CPT | Mod: CPTII,S$GLB,, | Performed by: INTERNAL MEDICINE

## 2023-05-09 PROCEDURE — 3074F SYST BP LT 130 MM HG: CPT | Mod: CPTII,S$GLB,, | Performed by: INTERNAL MEDICINE

## 2023-05-09 PROCEDURE — 83036 HEMOGLOBIN GLYCOSYLATED A1C: CPT | Performed by: INTERNAL MEDICINE

## 2023-05-09 PROCEDURE — 3078F DIAST BP <80 MM HG: CPT | Mod: CPTII,S$GLB,, | Performed by: INTERNAL MEDICINE

## 2023-05-09 NOTE — PROGRESS NOTES
I certify that I was present in the room directing the student during this visit  and guiding them using my skilled judgment. As the co-signing Physician  I have reviewed the students documentation and am responsible for the treatment, assessment, and plan.       He has lost 12 # since last visit-- off insulin , and on ozempic.  His creatine is 1.6-- up form 1.4- but it was 1.6 when he had UTI.  Will recheck UA.  He has history of right sided hydronephrosis related to stones seen on ct in 2020- will get renal us.  Could be the change from spironolactone      HGB A1c was 4.7.      Follow up in 3 months

## 2023-05-09 NOTE — PROGRESS NOTES
"Subjective    Mr. Deepak Tobias is a 47  y.o. male here for his annual exam. He has been running and has lost 12# since Dr. Alexander last saw him.      He reports left knee pain last year. Has been exercising a lot and keeps the brace on it.   He saw ortho and  got 2 sets of injections, last set last year.     Patient states worsening stiffness and pain in hands, elbows and shoulders. It has been occurring for a few years, but has now gone away.    Patient had a UTI in December. Was prescribed cipro and had some relief for around a week and a half.  He is experiencing slow flow urination again.  Denies trouble starting or stopping urine. Denies nocturia. Denies polyuria or urgency. Denies hematuria and dysuria.      Hyperlipidemia:  He is taking pravastatin.  He has had hyperlipidemia for several years.  Most recent labs (12/2022) showed good control.       Hypertension: Present for multiple years.  He is on Valsartan/HCTZ 320-25mg and amlodipine 10mg. Spironolactone 25mg.  Blood pressure today is 129/79, well controlled.       Obesity:  He is weighing 240 today.  BMI is 34.76.   He is regaining weight he lost when he had COVID due to inactivity secondary to knee pain.   - Patient states his weight has been around 222, but has had a lot of salt over the weekend.   - Exercises for 1 hour a week, weights and cardio. More than 5k a day.   - Diet has been "horrible". Has been eating huge stacks of pancakes. He feels like he is losing weight too fast and his skin has not been keeping up with the weight loss.   - Ozempic is no longer controlling cravings  - Patient has been experiencing slower bowel movements on ozempic, has a bowel movement every 2-3 days instead of daily.  - does not drink alcohol    DM: Last hemoglobin A1c was 4.7  .  He is on ozemipic 1mg-- off Lantus.  Metformin discontinued at last visit.       Recent creatinine was 1.6 (5/23) -- normal range 1.2-1.3  we did add spironolactone last visit to his bp " meds-     Sleep apnea: He uses CPAP without any difficulty.  Using it well- not problem.  Sleep has been good.     Palpitations: for about 3 months starting around  Thanksgiving. No chest pain or SOB. No discomfort.     Mood- has not been working, has been staying at home with kids, increased stress, denies anxiety, feels more irritable, decreased patience, noticed an increase in the last couple months   - denies hx of anxiety or depression   - has had panic attacks around 5 and 9 years ago    ROS : Gen - no fatigue-- weight loss as above .  Eyes - no eye pain or visual changes  ENT - no hoarseness or sore throat  CV - No chest pain or SOB.   Positive for  palpitations.  Pulm - no cough or wheezing  GI - no N/V/D   no dysuria or incontinence, positive for change in urination  MS - no joint pain or muscle pain  Skin - no rash, or c/o of skin lesions  Neuro - no HA, dizziness--- memory is doing well.   Heme - no abnormal bleeding or bruising  Endo - no polydipsia, or temperature changes  Psych - no anxiety or depression, feels irritable and decreased patience     Patient Active Problem List   Diagnosis    Hyperlipidemia    Hypertension    Obesity    Sleep apnea    Rash    Type 2 diabetes mellitus with hyperglycemia, without long-term current use of insulin    Gingiva disorder      Current Outpatient Medications on File Prior to Visit   Medication Sig Dispense Refill    amLODIPine (NORVASC) 10 MG tablet Take 1 tablet (10 mg total) by mouth once daily. 90 tablet 2    blood-glucose meter (ONETOUCH VERIO SYSTEM) Misc 1 Units by Misc.(Non-Drug; Combo Route) route once daily. 1 each 0    clindamycin-tretinoin (ZIANA) gel       clobetasoL (TEMOVATE) 0.05 % cream Apply to affected area on right hand twice daily during flares. Use for two weeks at a time. Do no apply to face, underarms, or groin. 30 g 2    lancets Misc 1 lancet by Misc.(Non-Drug; Combo Route) route 3 (three) times daily with meals. 300 each 11    lancing  device Misc 1 Device by Misc.(Non-Drug; Combo Route) route 2 (two) times daily with meals. 1 each 0    ONETOUCH DELICA PLUS LANCET 33 gauge Misc TEST 3 TIMES A DAY WITH MEALS 300 each 3    ONETOUCH VERIO TEST STRIPS Strp TEST 3 TIMES A DAY WITH MEALS 300 each 3    pravastatin (PRAVACHOL) 20 MG tablet TAKE ONE TABLET BY MOUTH DAILY 90 tablet 3    semaglutide (OZEMPIC) 1 mg/dose (4 mg/3 mL) Inject 1 mg into the skin every 7 days. 1 pen 11    spironolactone (ALDACTONE) 25 MG tablet TAKE 1 TABLET (25 MG TOTAL) BY MOUTH ONCE DAILY. 90 tablet 1    valsartan-hydrochlorothiazide (DIOVAN-HCT) 320-25 mg per tablet TAKE 1 TABLET BY MOUTH ONCE DAILY. 90 tablet 3     No current facility-administered medications on file prior to visit.      OBJECTIVE:      Vital Signs:     Vitals:    05/09/23 0849   BP: 129/79   Pulse: (!) 54   Body mass index is 34.76 kg/m².          Physical Exam  he is a morbidly obese 47-- year-old gentleman.  His mood is good.    Constitutional: He is oriented to person, place, and time and well-developed, well-nourished, and in no distress.   HENT:   Head: Normocephalic and atraumatic.   Eyes: EOM are normal.   Neck: Normal range of motion.   Cardiovascular: Normal rate, regular rhythm, normal heart sounds and intact distal pulses.   Pulmonary/Chest: Effort normal and breath sounds normal. He has no wheezes. He has no rales.   Abdominal: Soft. Bowel sounds are normal. There is no abdominal tenderness.   Musculoskeletal:         General: No edema.      Comments:  Today he is not having any complaints of any joints.  He does have crepitus in both knees though.  Neurological: He is alert and oriented to person, place, and time.   Skin: Skin is warm and dry.   Psychiatric: Mood normal.               ASSESSMENT & PLAN:   Hypertension-- doing well.   - BP today-   129/79  - Continue Diovan 320-25mg and amlopidipine 10mg   -   Spironolactone 25mg-      Hyperlipidemia       -      Continue pravastatin-- check lipid  panel next time      Type 2 diabetes mellitus with hyperglycemia, without long-term current use of insulin   - Most recent A1c 5/23- 4.7   -     off lantus and metformin and   on Ozempic. Well controlled      Obesity--   - discussed diet and exercise as tolerated.    - Lost 12pounds since last visit   -  Decrease   Ozempic 4mg/3mL in half due to patient wishing to lose weight slower     NOEMY--   - Creatinine (5/23) is 1.6      Urination change  - slowed urination and hx od hydronephrosis due to kidney stones in 2020  -UA  - renal US     RTC 3 months    Kellen Penaloza, MS4

## 2023-05-23 DIAGNOSIS — E11.65 TYPE 2 DIABETES MELLITUS WITH HYPERGLYCEMIA, WITHOUT LONG-TERM CURRENT USE OF INSULIN: ICD-10-CM

## 2023-05-23 RX ORDER — SEMAGLUTIDE 1.34 MG/ML
1 INJECTION, SOLUTION SUBCUTANEOUS
Qty: 9 EACH | Refills: 1 | Status: SHIPPED | OUTPATIENT
Start: 2023-05-23 | End: 2023-11-02 | Stop reason: SDUPTHER

## 2023-05-23 NOTE — TELEPHONE ENCOUNTER
Refill Decision Note   Deepak Micheline  is requesting a refill authorization.  Brief Assessment and Rationale for Refill:  Approve     Medication Therapy Plan:         Comments:     No Care Gaps recommended.     Note composed:1:54 PM 05/23/2023

## 2023-05-23 NOTE — TELEPHONE ENCOUNTER
No care due was identified.  [facility] Embedded Care Due Messages. Reference number: [id number].   5/23/2023 10:07:51 AM CDT

## 2023-05-26 ENCOUNTER — HOSPITAL ENCOUNTER (OUTPATIENT)
Dept: RADIOLOGY | Facility: HOSPITAL | Age: 48
Discharge: HOME | End: 2023-05-26
Attending: INTERNAL MEDICINE
Payer: COMMERCIAL

## 2023-05-26 DIAGNOSIS — N17.9 AKI (ACUTE KIDNEY INJURY): ICD-10-CM

## 2023-05-26 PROCEDURE — 76770 US RETROPERITONEAL COMPLETE: ICD-10-PCS | Mod: 26,,, | Performed by: RADIOLOGY

## 2023-05-26 PROCEDURE — 76770 US EXAM ABDO BACK WALL COMP: CPT | Mod: TC

## 2023-05-26 PROCEDURE — 76770 US EXAM ABDO BACK WALL COMP: CPT | Mod: 26,,, | Performed by: RADIOLOGY

## 2023-06-05 DIAGNOSIS — E11.65 TYPE 2 DIABETES MELLITUS WITH HYPERGLYCEMIA, WITHOUT LONG-TERM CURRENT USE OF INSULIN: ICD-10-CM

## 2023-06-05 RX ORDER — AMLODIPINE BESYLATE 10 MG/1
10 TABLET ORAL DAILY
Qty: 90 TABLET | Refills: 3 | Status: SHIPPED | OUTPATIENT
Start: 2023-06-05

## 2023-06-05 NOTE — TELEPHONE ENCOUNTER
Refill Routing Note   Medication(s) are not appropriate for processing by [facility] for the following reason(s):      Required labs abnormal  No active prescription written by PCP    [facility] action(s):  Defer  Approve None identified     Medication Therapy Plan: Valsartan-HCTZ- on the med list 23.      Appointments  past 12m or future 3m with PCP    Date Provider   Last Visit   2023 Edis Alexander Jr., MD   Next Visit   Visit date not found Edis Alexander Jr., MD   ED visits in past 90 days: 0        Note composed:5:33 PM 2023

## 2023-06-05 NOTE — TELEPHONE ENCOUNTER
No care due was identified.  [facility] Embedded Care Due Messages. Reference number: [id number].   6/05/2023 2:16:39 PM CDT

## 2023-06-06 RX ORDER — VALSARTAN AND HYDROCHLOROTHIAZIDE 320; 25 MG/1; MG/1
1 TABLET, FILM COATED ORAL DAILY
Qty: 90 TABLET | Refills: 3 | Status: SHIPPED | OUTPATIENT
Start: 2023-06-06 | End: 2024-01-02 | Stop reason: SDUPTHER

## 2023-08-25 ENCOUNTER — TELEPHONE (OUTPATIENT)
Dept: SPORTS MEDICINE | Facility: CLINIC | Age: 48
End: 2023-08-25

## 2023-08-25 NOTE — TELEPHONE ENCOUNTER
Called and left voicemail for patient to contact the office in regards to appointment with Dr. Petersen on 8/30/23

## 2023-08-30 ENCOUNTER — TELEPHONE (OUTPATIENT)
Dept: SPORTS MEDICINE | Facility: CLINIC | Age: 48
End: 2023-08-30

## 2023-08-30 DIAGNOSIS — M25.312 INSTABILITY OF LEFT SHOULDER JOINT: Primary | ICD-10-CM

## 2023-08-30 NOTE — TELEPHONE ENCOUNTER
Second attempt -  called pt to determine laterality of shoulder issue and to advise that we will need XR of shoulder prior to appt. Advised that at this time, no XR appts available at [facility]. Requested return phone call PIERRE Cline MS, OTC  Clinical Assistant to Dr. Yessi Petersen

## 2023-09-12 ENCOUNTER — OFFICE VISIT (OUTPATIENT)
Dept: SPORTS MEDICINE | Facility: CLINIC | Age: 48
End: 2023-09-12
Payer: COMMERCIAL

## 2023-09-12 ENCOUNTER — HOSPITAL ENCOUNTER (OUTPATIENT)
Dept: RADIOLOGY | Facility: HOSPITAL | Age: 48
Discharge: HOME | End: 2023-09-12
Attending: STUDENT IN AN ORGANIZED HEALTH CARE EDUCATION/TRAINING PROGRAM
Payer: COMMERCIAL

## 2023-09-12 VITALS
WEIGHT: 236.13 LBS | BODY MASS INDEX: 35.9 KG/M2 | SYSTOLIC BLOOD PRESSURE: 127 MMHG | HEART RATE: 62 BPM | DIASTOLIC BLOOD PRESSURE: 81 MMHG

## 2023-09-12 DIAGNOSIS — M25.312 INSTABILITY OF LEFT SHOULDER JOINT: ICD-10-CM

## 2023-09-12 DIAGNOSIS — M25.511 ACUTE PAIN OF RIGHT SHOULDER: Primary | ICD-10-CM

## 2023-09-12 DIAGNOSIS — S49.92XA INJURY OF LEFT SHOULDER, INITIAL ENCOUNTER: ICD-10-CM

## 2023-09-12 PROCEDURE — 1159F MED LIST DOCD IN RCRD: CPT | Mod: CPTII,S$GLB,, | Performed by: STUDENT IN AN ORGANIZED HEALTH CARE EDUCATION/TRAINING PROGRAM

## 2023-09-12 PROCEDURE — 1160F RVW MEDS BY RX/DR IN RCRD: CPT | Mod: CPTII,S$GLB,, | Performed by: STUDENT IN AN ORGANIZED HEALTH CARE EDUCATION/TRAINING PROGRAM

## 2023-09-12 PROCEDURE — 99214 PR OFFICE/OUTPT VISIT, EST, LEVL IV, 30-39 MIN: ICD-10-PCS | Mod: S$GLB,,, | Performed by: STUDENT IN AN ORGANIZED HEALTH CARE EDUCATION/TRAINING PROGRAM

## 2023-09-12 PROCEDURE — 3008F PR BODY MASS INDEX (BMI) DOCUMENTED: ICD-10-PCS | Mod: CPTII,S$GLB,, | Performed by: STUDENT IN AN ORGANIZED HEALTH CARE EDUCATION/TRAINING PROGRAM

## 2023-09-12 PROCEDURE — 3074F SYST BP LT 130 MM HG: CPT | Mod: CPTII,S$GLB,, | Performed by: STUDENT IN AN ORGANIZED HEALTH CARE EDUCATION/TRAINING PROGRAM

## 2023-09-12 PROCEDURE — 3074F PR MOST RECENT SYSTOLIC BLOOD PRESSURE < 130 MM HG: ICD-10-PCS | Mod: CPTII,S$GLB,, | Performed by: STUDENT IN AN ORGANIZED HEALTH CARE EDUCATION/TRAINING PROGRAM

## 2023-09-12 PROCEDURE — 3044F HG A1C LEVEL LT 7.0%: CPT | Mod: CPTII,S$GLB,, | Performed by: STUDENT IN AN ORGANIZED HEALTH CARE EDUCATION/TRAINING PROGRAM

## 2023-09-12 PROCEDURE — 3079F DIAST BP 80-89 MM HG: CPT | Mod: CPTII,S$GLB,, | Performed by: STUDENT IN AN ORGANIZED HEALTH CARE EDUCATION/TRAINING PROGRAM

## 2023-09-12 PROCEDURE — 73030 X-RAY EXAM OF SHOULDER: CPT | Mod: 26,LT,, | Performed by: RADIOLOGY

## 2023-09-12 PROCEDURE — 73030 XR SHOULDER COMPLETE 2 OR MORE VIEWS LEFT: ICD-10-PCS | Mod: 26,LT,, | Performed by: RADIOLOGY

## 2023-09-12 PROCEDURE — 1160F PR REVIEW ALL MEDS BY PRESCRIBER/CLIN PHARMACIST DOCUMENTED: ICD-10-PCS | Mod: CPTII,S$GLB,, | Performed by: STUDENT IN AN ORGANIZED HEALTH CARE EDUCATION/TRAINING PROGRAM

## 2023-09-12 PROCEDURE — 3008F BODY MASS INDEX DOCD: CPT | Mod: CPTII,S$GLB,, | Performed by: STUDENT IN AN ORGANIZED HEALTH CARE EDUCATION/TRAINING PROGRAM

## 2023-09-12 PROCEDURE — 3044F PR MOST RECENT HEMOGLOBIN A1C LEVEL <7.0%: ICD-10-PCS | Mod: CPTII,S$GLB,, | Performed by: STUDENT IN AN ORGANIZED HEALTH CARE EDUCATION/TRAINING PROGRAM

## 2023-09-12 PROCEDURE — 73030 X-RAY EXAM OF SHOULDER: CPT | Mod: TC,PN,LT

## 2023-09-12 PROCEDURE — 99214 OFFICE O/P EST MOD 30 MIN: CPT | Mod: S$GLB,,, | Performed by: STUDENT IN AN ORGANIZED HEALTH CARE EDUCATION/TRAINING PROGRAM

## 2023-09-12 PROCEDURE — 99999 PR PBB SHADOW E&M-EST. PATIENT-LVL IV: ICD-10-PCS | Mod: PBBFAC,,, | Performed by: STUDENT IN AN ORGANIZED HEALTH CARE EDUCATION/TRAINING PROGRAM

## 2023-09-12 PROCEDURE — 1159F PR MEDICATION LIST DOCUMENTED IN MEDICAL RECORD: ICD-10-PCS | Mod: CPTII,S$GLB,, | Performed by: STUDENT IN AN ORGANIZED HEALTH CARE EDUCATION/TRAINING PROGRAM

## 2023-09-12 PROCEDURE — 3079F PR MOST RECENT DIASTOLIC BLOOD PRESSURE 80-89 MM HG: ICD-10-PCS | Mod: CPTII,S$GLB,, | Performed by: STUDENT IN AN ORGANIZED HEALTH CARE EDUCATION/TRAINING PROGRAM

## 2023-09-12 PROCEDURE — 1125F PR PAIN SEVERITY QUANTIFIED, PAIN PRESENT: ICD-10-PCS | Mod: CPTII,S$GLB,, | Performed by: STUDENT IN AN ORGANIZED HEALTH CARE EDUCATION/TRAINING PROGRAM

## 2023-09-12 PROCEDURE — 99999 PR PBB SHADOW E&M-EST. PATIENT-LVL IV: CPT | Mod: PBBFAC,,, | Performed by: STUDENT IN AN ORGANIZED HEALTH CARE EDUCATION/TRAINING PROGRAM

## 2023-09-12 PROCEDURE — 1125F AMNT PAIN NOTED PAIN PRSNT: CPT | Mod: CPTII,S$GLB,, | Performed by: STUDENT IN AN ORGANIZED HEALTH CARE EDUCATION/TRAINING PROGRAM

## 2023-09-12 NOTE — PROGRESS NOTES
CC: left shoulder pain    48 y.o. Male presents today for evaluation of his left shoulder pain. Pt states that early summer he was reaching above head to stop himself from falling. Pt states shoulder feels slightly unstable. Pt states that when he is driving he gets a sharp pain when turning left. Pt states he has a dull achy pain normally. Pt states his pain is a 5/10 today. Pt localizes pain to anterior superior shoulder.  Patient feels that he is slowly starting to improve.    Hand dominance: right     SYMPTOMS:   Pain Score:5/10  Pain location: anterior /superior   Time of onset: early summer   Trauma, injury: yes     Nocturnal pain: only when he sleeps on that side   Weakness: yes  Clicking, catching: yes   Neck problems: none    INTERVENTIONS:   Medications tried: ibuprofen - intermitted with relief.   Physical therapy: none  Injections: none    RELEVANT HISTORY:   Imaging to date: 9/12/23  Previous significant shoulder/arm injuries: none  Previous shoulder surgeries: none     Occupation: air condition     REVIEW OF SYSTEMS:   Constitution: Patient denies fever or chills.  Eyes: Patient denies eye pain or vision changes.  HEENT: Patient denies ear pain, sore throat, or nasal discharge.  CVS: Patient denies chest pain.  Lungs: Patient denies shortness of breath or cough.  Abdomen: Patient denies any stomach pain, nausea, vomiting, or diarrhea  Skin: Patient denies skin rash or itching.    Musculoskeletal: Patient denies recent injuries or trauma.  Neuro: Patient denies any numbness or tingling in upper or lower extremities.  Psych: Patient denies any current anxiety or nervousness.    PAST MEDICAL HISTORY:   Past Medical History:   Diagnosis Date    Diabetes mellitus     Hyperlipidemia     Hypertension     Obesity     Sleep apnea        PAST SURGICAL HISTORY:  Past Surgical History:   Procedure Laterality Date    achilies tendon         FAMILY HISTORY:  Family History   Problem Relation Age of Onset     Hypertension Father     Cancer Father     Blindness Mother     Diabetes Maternal Uncle     Acne Neg Hx     Melanoma Neg Hx     Amblyopia Neg Hx     Cataracts Neg Hx     Glaucoma Neg Hx     Macular degeneration Neg Hx     Retinal detachment Neg Hx     Strabismus Neg Hx     Stroke Neg Hx     Thyroid disease Neg Hx        SOCIAL HISTORY:  Social History     Socioeconomic History    Marital status:    Occupational History     Employer: [employer]   Tobacco Use    Smoking status: Never    Smokeless tobacco: Never   Substance and Sexual Activity    Alcohol use: No    Drug use: No    Sexual activity: Yes     Partners: Female     Social Determinants of Health     Financial Resource Strain: High Risk (8/28/2023)    Overall Financial Resource Strain (CARDIA)     Difficulty of Paying Living Expenses: Hard   Food Insecurity: No Food Insecurity (8/28/2023)    Hunger Vital Sign     Worried About Running Out of Food in the Last Year: Never true     Ran Out of Food in the Last Year: Never true   Transportation Needs: No Transportation Needs (8/28/2023)    PRAPARE - Transportation     Lack of Transportation (Medical): No     Lack of Transportation (Non-Medical): No   Physical Activity: Sufficiently Active (8/28/2023)    Exercise Vital Sign     Days of Exercise per Week: 5 days     Minutes of Exercise per Session: 60 min   Stress: No Stress Concern Present (8/28/2023)    [employer] [place] - Occupational Stress Questionnaire     Feeling of Stress : Not at all   Social Connections: Unknown (8/28/2023)    Social Connection and Isolation Panel [NHANES]     Frequency of Communication with Friends and Family: More than three times a week     Frequency of Social Gatherings with Friends and Family: Twice a week     Active Member of Clubs or Organizations: No     Attends Club or Organization Meetings: Never     Marital Status:    Housing Stability: High Risk (8/28/2023)    Housing Stability Vital Sign      Unable to Pay for Housing in the Last Year: Yes     Unstable Housing in the Last Year: No       MEDICATIONS:     Current Outpatient Medications:     amLODIPine (NORVASC) 10 MG tablet, Take 1 tablet (10 mg total) by mouth once daily., Disp: 90 tablet, Rfl: 3    clindamycin-tretinoin (ZIANA) gel, , Disp: , Rfl:     clobetasoL (TEMOVATE) 0.05 % cream, Apply to affected area on right hand twice daily during flares. Use for two weeks at a time. Do no apply to face, underarms, or groin., Disp: 30 g, Rfl: 2    lancets Misc, 1 lancet by Misc.(Non-Drug; Combo Route) route 3 (three) times daily with meals., Disp: 300 each, Rfl: 11    ONETOUCH DELICA PLUS LANCET 33 gauge Misc, TEST 3 TIMES A DAY WITH MEALS, Disp: 300 each, Rfl: 3    ONETOUCH VERIO TEST STRIPS Strp, TEST 3 TIMES A DAY WITH MEALS, Disp: 300 each, Rfl: 3    pravastatin (PRAVACHOL) 20 MG tablet, TAKE ONE TABLET BY MOUTH DAILY, Disp: 90 tablet, Rfl: 3    semaglutide (OZEMPIC) 1 mg/dose (4 mg/3 mL), Inject 1 mg into the skin every 7 days., Disp: 9 each, Rfl: 1    valsartan-hydrochlorothiazide (DIOVAN-HCT) 320-25 mg per tablet, Take 1 tablet by mouth once daily., Disp: 90 tablet, Rfl: 3    blood-glucose meter (ONETOUCH VERIO SYSTEM) Misc, 1 Units by Misc.(Non-Drug; Combo Route) route once daily., Disp: 1 each, Rfl: 0    lancing device Misc, 1 Device by Misc.(Non-Drug; Combo Route) route 2 (two) times daily with meals., Disp: 1 each, Rfl: 0    spironolactone (ALDACTONE) 25 MG tablet, TAKE 1 TABLET (25 MG TOTAL) BY MOUTH ONCE DAILY., Disp: 90 tablet, Rfl: 1    ALLERGIES:   Review of patient's allergies indicates:   Allergen Reactions    Keflex [cephalexin] Rash        PHYSICAL EXAMINATION:  /81   Pulse 62   Wt 107.1 kg (236 lb 1.8 oz)   BMI 35.90 kg/m²   Vitals signs and nursing note have been reviewed.    General: In no acute distress, well developed, well nourished, no diaphoresis  Eyes: EOM full and smooth, no eye redness or discharge  HEENT: normocephalic  and atraumatic, neck supple, trachea midline, no nasal discharge  Cardiovascular: no LE edema  Lungs: respirations non-labored, no conversational dyspnea   Neuro: AAOx3, CN2-12 grossly intact  Skin: No rashes, warm and dry  Psychiatric: cooperative, pleasant, mood and affect appropriate for age    Left Shoulder:  INSPECTION / PALPATION:  -Deformity   -Ecchymosis   -Atrophy   -Sulcus sign   -No TTP over anatomy including clavicle, scapular spine, ACJ, acromion, supraspinatus, infraspinatus, bicipital groove     ROM (* = with pain):    Flex to 180° vs 180° contra   Abduct to 170° vs 170° contra   Int rot to T7 vs T7 contra   Ext rot to 60° vs 60° contra  -Scapular dyskinesis     STRENGTH:    Scaption 5/5   Flexion 5/5   Ext rot 5/5   Int rot 5/5   Sup/Pro 5/5    OTHER:   +Painful arc   -Drop arm   -Int lag  -Ext lag  +Neer's   +Hampton-Denny   +Lares active compression   -Empty Can  +Full Can  -Speed's   -Yergason's  +Mild Apprehension    NECK:   Grossly FROM & painless in neck flex, ext, B/L torsion, B/L lat flexion   -Spurling B/L    Hands NVI B/L      IMAGIN. Shoulder X-ray ordered due to left shoulder pain. 3 views taken today.   2. X-ray images were reviewed personally by me and then directly with patient.  3. FINDINGS:  Normal bony alignment, joint space is maintained, no signs of acute fracture or dislocation, soft tissues unremarkable.    4. IMPRESSION:  No acute osseous abnormalities appreciated.    ASSESSMENT:      ICD-10-CM ICD-9-CM   1. Acute pain of right shoulder  M25.511 719.41   2. Instability of left shoulder joint  M25.312 718.81   3. Injury of left shoulder, initial encounter  S49.92XA 959.2         PLAN:  Based on patient history, physical exam findings, and imaging believe patient might have just strained his shoulder, differential diagnosis includes rotator cuff strain and possible labral pathology.  We will start with formal physical therapy.  Pending improvement at 6 week daniel, may  move for with injection therapy and or MRI arthrogram.    Future planning includes - formal physical therapy    All questions were answered to the best of my ability and all concerns were addressed at this time.    Follow up in 6 week(s) for above, or sooner if needed.      This note is dictated using the M*Modal Fluency Direct word recognition program. There are word recognition mistakes that are occasionally missed on review.

## 2023-09-25 RX ORDER — SPIRONOLACTONE 25 MG/1
25 TABLET ORAL DAILY
Qty: 90 TABLET | Refills: 1 | Status: SHIPPED | OUTPATIENT
Start: 2023-09-25

## 2023-09-25 NOTE — TELEPHONE ENCOUNTER
Care Due:                  Date            Visit Type   Department     Provider  --------------------------------------------------------------------------------                                EP -                              PRIMARY      [facility]  Last Visit: 05-      CARE ([facility])   MEDICINE       Edis Alexander  Next Visit: None Scheduled  None         None Found                                                            Last  Test          Frequency    Reason                     Performed    Due Date  --------------------------------------------------------------------------------    HBA1C.......  6 months...  semaglutide..............  05- 11-    Lipid Panel.  12 months..  pravastatin..............  12- 12-    [facility] Embedded Care Due Messages. Reference number: [id number].   9/24/2023 8:59:57 PM CDT

## 2023-11-02 DIAGNOSIS — E11.65 TYPE 2 DIABETES MELLITUS WITH HYPERGLYCEMIA, WITHOUT LONG-TERM CURRENT USE OF INSULIN: ICD-10-CM

## 2023-11-03 ENCOUNTER — PATIENT MESSAGE (OUTPATIENT)
Dept: INTERNAL MEDICINE | Facility: CLINIC | Age: 48
End: 2023-11-03
Payer: COMMERCIAL

## 2023-11-03 DIAGNOSIS — E11.65 TYPE 2 DIABETES MELLITUS WITH HYPERGLYCEMIA, WITHOUT LONG-TERM CURRENT USE OF INSULIN: Primary | ICD-10-CM

## 2023-11-03 RX ORDER — SEMAGLUTIDE 1.34 MG/ML
1 INJECTION, SOLUTION SUBCUTANEOUS
Qty: 3 EACH | Refills: 0 | Status: SHIPPED | OUTPATIENT
Start: 2023-11-03 | End: 2024-02-22 | Stop reason: SDUPTHER

## 2023-11-03 NOTE — TELEPHONE ENCOUNTER
Refill Decision Note   Deepak Micheline  is requesting a refill authorization.  Brief Assessment and Rationale for Refill:  Approve     Medication Therapy Plan:         Comments:     Note composed:8:08 AM 11/03/2023

## 2023-11-08 ENCOUNTER — LAB VISIT (OUTPATIENT)
Dept: LAB | Facility: HOSPITAL | Age: 48
End: 2023-11-08
Attending: INTERNAL MEDICINE
Payer: COMMERCIAL

## 2023-11-08 DIAGNOSIS — E11.65 TYPE 2 DIABETES MELLITUS WITH HYPERGLYCEMIA, WITHOUT LONG-TERM CURRENT USE OF INSULIN: ICD-10-CM

## 2023-11-08 LAB
ALBUMIN SERPL BCP-MCNC: 4.1 G/DL (ref 3.5–5.2)
ALP SERPL-CCNC: 50 U/L (ref 55–135)
ALT SERPL W/O P-5'-P-CCNC: 46 U/L (ref 10–44)
ANION GAP SERPL CALC-SCNC: 10 MMOL/L (ref 8–16)
AST SERPL-CCNC: 38 U/L (ref 10–40)
BILIRUB SERPL-MCNC: 0.5 MG/DL (ref 0.1–1)
BUN SERPL-MCNC: 31 MG/DL (ref 6–20)
CALCIUM SERPL-MCNC: 9.9 MG/DL (ref 8.7–10.5)
CHLORIDE SERPL-SCNC: 107 MMOL/L (ref 95–110)
CHOLEST SERPL-MCNC: 186 MG/DL (ref 120–199)
CHOLEST/HDLC SERPL: 4.8 {RATIO} (ref 2–5)
CO2 SERPL-SCNC: 24 MMOL/L (ref 23–29)
CREAT SERPL-MCNC: 1.8 MG/DL (ref 0.5–1.4)
EST. GFR  (NO RACE VARIABLE): 45.9 ML/MIN/1.73 M^2
ESTIMATED AVG GLUCOSE: 88 MG/DL (ref 68–131)
GLUCOSE SERPL-MCNC: 102 MG/DL (ref 70–110)
HBA1C MFR BLD: 4.7 % (ref 4–5.6)
HDLC SERPL-MCNC: 39 MG/DL (ref 40–75)
HDLC SERPL: 21 % (ref 20–50)
LDLC SERPL CALC-MCNC: 106.2 MG/DL (ref 63–159)
NONHDLC SERPL-MCNC: 147 MG/DL
POTASSIUM SERPL-SCNC: 4.4 MMOL/L (ref 3.5–5.1)
PROT SERPL-MCNC: 8.1 G/DL (ref 6–8.4)
SODIUM SERPL-SCNC: 141 MMOL/L (ref 136–145)
TRIGL SERPL-MCNC: 204 MG/DL (ref 30–150)

## 2023-11-08 PROCEDURE — 80053 COMPREHEN METABOLIC PANEL: CPT | Performed by: INTERNAL MEDICINE

## 2023-11-08 PROCEDURE — 80061 LIPID PANEL: CPT | Performed by: INTERNAL MEDICINE

## 2023-11-08 PROCEDURE — 83036 HEMOGLOBIN GLYCOSYLATED A1C: CPT | Performed by: INTERNAL MEDICINE

## 2023-11-08 PROCEDURE — 36415 COLL VENOUS BLD VENIPUNCTURE: CPT | Mod: PN | Performed by: INTERNAL MEDICINE

## 2023-11-13 ENCOUNTER — OFFICE VISIT (OUTPATIENT)
Dept: INTERNAL MEDICINE | Facility: CLINIC | Age: 48
End: 2023-11-13
Payer: COMMERCIAL

## 2023-11-13 ENCOUNTER — HOSPITAL ENCOUNTER (OUTPATIENT)
Dept: RADIOLOGY | Facility: HOSPITAL | Age: 48
Discharge: HOME | End: 2023-11-13
Attending: INTERNAL MEDICINE
Payer: COMMERCIAL

## 2023-11-13 VITALS
DIASTOLIC BLOOD PRESSURE: 82 MMHG | BODY MASS INDEX: 36.89 KG/M2 | OXYGEN SATURATION: 99 % | WEIGHT: 243.38 LBS | HEART RATE: 67 BPM | HEIGHT: 68 IN | SYSTOLIC BLOOD PRESSURE: 128 MMHG

## 2023-11-13 DIAGNOSIS — E78.5 HYPERLIPIDEMIA, UNSPECIFIED HYPERLIPIDEMIA TYPE: Primary | ICD-10-CM

## 2023-11-13 DIAGNOSIS — E66.01 CLASS 2 SEVERE OBESITY WITH SERIOUS COMORBIDITY AND BODY MASS INDEX (BMI) OF 37.0 TO 37.9 IN ADULT, UNSPECIFIED OBESITY TYPE: ICD-10-CM

## 2023-11-13 DIAGNOSIS — M79.672 LEFT FOOT PAIN: ICD-10-CM

## 2023-11-13 DIAGNOSIS — I10 PRIMARY HYPERTENSION: ICD-10-CM

## 2023-11-13 DIAGNOSIS — E11.65 TYPE 2 DIABETES MELLITUS WITH HYPERGLYCEMIA, WITHOUT LONG-TERM CURRENT USE OF INSULIN: ICD-10-CM

## 2023-11-13 DIAGNOSIS — N18.31 STAGE 3A CHRONIC KIDNEY DISEASE: ICD-10-CM

## 2023-11-13 DIAGNOSIS — G47.30 SLEEP APNEA, UNSPECIFIED TYPE: ICD-10-CM

## 2023-11-13 PROCEDURE — 73630 X-RAY EXAM OF FOOT: CPT | Mod: 26,LT,, | Performed by: RADIOLOGY

## 2023-11-13 PROCEDURE — 99999 PR PBB SHADOW E&M-EST. PATIENT-LVL IV: ICD-10-PCS | Mod: PBBFAC,,, | Performed by: INTERNAL MEDICINE

## 2023-11-13 PROCEDURE — 3044F HG A1C LEVEL LT 7.0%: CPT | Mod: CPTII,S$GLB,, | Performed by: INTERNAL MEDICINE

## 2023-11-13 PROCEDURE — 3079F PR MOST RECENT DIASTOLIC BLOOD PRESSURE 80-89 MM HG: ICD-10-PCS | Mod: CPTII,S$GLB,, | Performed by: INTERNAL MEDICINE

## 2023-11-13 PROCEDURE — 73630 XR FOOT COMPLETE 3 VIEW LEFT: ICD-10-PCS | Mod: 26,LT,, | Performed by: RADIOLOGY

## 2023-11-13 PROCEDURE — 3008F PR BODY MASS INDEX (BMI) DOCUMENTED: ICD-10-PCS | Mod: CPTII,S$GLB,, | Performed by: INTERNAL MEDICINE

## 2023-11-13 PROCEDURE — 3079F DIAST BP 80-89 MM HG: CPT | Mod: CPTII,S$GLB,, | Performed by: INTERNAL MEDICINE

## 2023-11-13 PROCEDURE — 99214 OFFICE O/P EST MOD 30 MIN: CPT | Mod: S$GLB,,, | Performed by: INTERNAL MEDICINE

## 2023-11-13 PROCEDURE — 73630 X-RAY EXAM OF FOOT: CPT | Mod: TC,LT

## 2023-11-13 PROCEDURE — 3074F SYST BP LT 130 MM HG: CPT | Mod: CPTII,S$GLB,, | Performed by: INTERNAL MEDICINE

## 2023-11-13 PROCEDURE — 3008F BODY MASS INDEX DOCD: CPT | Mod: CPTII,S$GLB,, | Performed by: INTERNAL MEDICINE

## 2023-11-13 PROCEDURE — 1159F MED LIST DOCD IN RCRD: CPT | Mod: CPTII,S$GLB,, | Performed by: INTERNAL MEDICINE

## 2023-11-13 PROCEDURE — 99214 PR OFFICE/OUTPT VISIT, EST, LEVL IV, 30-39 MIN: ICD-10-PCS | Mod: S$GLB,,, | Performed by: INTERNAL MEDICINE

## 2023-11-13 PROCEDURE — 3074F PR MOST RECENT SYSTOLIC BLOOD PRESSURE < 130 MM HG: ICD-10-PCS | Mod: CPTII,S$GLB,, | Performed by: INTERNAL MEDICINE

## 2023-11-13 PROCEDURE — 3044F PR MOST RECENT HEMOGLOBIN A1C LEVEL <7.0%: ICD-10-PCS | Mod: CPTII,S$GLB,, | Performed by: INTERNAL MEDICINE

## 2023-11-13 PROCEDURE — 1159F PR MEDICATION LIST DOCUMENTED IN MEDICAL RECORD: ICD-10-PCS | Mod: CPTII,S$GLB,, | Performed by: INTERNAL MEDICINE

## 2023-11-13 PROCEDURE — 99999 PR PBB SHADOW E&M-EST. PATIENT-LVL IV: CPT | Mod: PBBFAC,,, | Performed by: INTERNAL MEDICINE

## 2023-11-13 NOTE — PROGRESS NOTES
Mr. Deepak Tobias is a 48  y.o. male here for his annual exam. He has been running and has lost 12# since Dr. Alexander last saw him.      He reports left knee pain last year. Has been exercising a lot and keeps the brace on it.   He saw ortho and  got 2 sets of injections, last set last year. Still wears brace when he jogs.  He has pulled his hamstring left twice--running (slipped) .  Thinks he has cubboid fracture in his left foot-- low of pain.       Patient states worsening stiffness and pain in hands, elbows and shoulders. It has been occurring for a few years, but has now gone away.           Hyperlipidemia:  He is taking pravastatin.  He has had hyperlipidemia for several years.  Most recent labs (11/2023) showed good control.  TG was 204     Hypertension: Present for multiple years.  He is on Valsartan/HCTZ 320-25mg and amlodipine 10mg. Spironolactone 25mg.  Blood pressure today is 128/82, well controlled.       Obesity:  He is weighing 243 today.  BMI is 34.76.   He is regaining weight he lost when he had COVID due to inactivity secondary to knee pain/ foot pain.   - Patient states his weight has been around 222, but has had a lot of salt over the weekend.    - Ozempic is no longer controlling cravings  - Patient has been experiencing slower bowel movements on ozempic, has a bowel movement every 2-3 days instead of daily.  - does not drink alcohol     DM: Last hemoglobin A1c was 4.7  .  He is on ozemipic 1mg-- off Lantus.  Metformin discontinued at last visit.          Recent creatinine was 1.8 (11/23) -- normal range 1.4-1.6  we did add spironolactone last visit to his bp meds-  He has been using  mg bid since Sept.  -- He stopped yesterday.       Sleep apnea: He uses CPAP without any difficulty.  Using it well- not problem.  Sleep has been good.            Mood- has not been working, has been staying at home with kids, increased stress, denies anxiety, feels more irritable, decreased patience,  "noticed an increase in the last couple months      - denies hx of anxiety or depression      - has had panic attacks around 5 and 9 years ago     ROS : Gen - no fatigue-- weight loss as above .  Eyes - no eye pain or visual changes  ENT - no hoarseness or sore throat  CV - No chest pain or SOB.   Positive for  palpitations.  Pulm - no cough or wheezing  GI - no N/V/D   no dysuria or incontinence, positive for change in urination  MS - no joint pain or muscle pain  Skin - no rash, or c/o of skin lesions  Neuro - no HA, dizziness--- memory is doing well.   Heme - no abnormal bleeding or bruising  Endo - no polydipsia, or temperature changes  Psych - no anxiety or depression, feels irritable and decreased patience         Patient Active Problem List   Diagnosis    Hyperlipidemia    Hypertension    Obesity    Sleep apnea    Rash    Type 2 diabetes mellitus with hyperglycemia, without long-term current use of insulin    Gingiva disorder            OBJECTIVE:       /82 (BP Location: Right arm, Patient Position: Sitting, BP Method: Large (Manual))   Pulse 67   Ht 5' 8" (1.727 m)   Wt 110.4 kg (243 lb 6.2 oz)   SpO2 99%   BMI 37.01 kg/m²         Physical Exam  he is a morbidly obese 47-- year-old gentleman.  His mood is good.    Constitutional: He is oriented to person, place, and time and well-developed, well-nourished, and in no distress.   HENT:   Head: Normocephalic and atraumatic.   Eyes: EOM are normal.   Neck: Normal range of motion.   Cardiovascular: Normal rate, regular rhythm, normal heart sounds and intact distal pulses.   Pulmonary/Chest: Effort normal and breath sounds normal. He has no wheezes. He has no rales.   Abdominal: Soft. Bowel sounds are normal. There is no abdominal tenderness.   Musculoskeletal:         General: No edema.      Comments:  Today he is not having any complaints of any joints.  He does have crepitus in both knees though.  Neurological: He is alert and oriented to person, " place, and time.   Skin: Skin is warm and dry.   Psychiatric: Mood normal.               ASSESSMENT & PLAN:   Hypertension-- doing well.   -           BP today-   128/82  -           Continue Diovan 320-25mg and amlopidipine 10mg   -            Spironolactone 25mg-      Hyperlipidemia       -      Continue pravastatin-- check lipid panel next time      Type 2 diabetes mellitus with hyperglycemia, without long-term current use of insulin              -           Most recent A1c 5/23- 4.7        -     off lantus and metformin and   on Ozempic. Well controlled      Obesity--   -           discussed diet and exercise as tolerated.    -           Decrease   Ozempic 4mg/3mL in half due to patient wishing to lose weight slower     NOEMY-- ckd  -           Creatinine (11/23) is 1.8   Stay off IBP--       Saw recent renal us.  normal     RTC 6 months        Pod- xray-- foot

## 2023-11-16 ENCOUNTER — OFFICE VISIT (OUTPATIENT)
Dept: PODIATRY | Facility: CLINIC | Age: 48
End: 2023-11-16
Payer: COMMERCIAL

## 2023-11-16 VITALS
BODY MASS INDEX: 36.89 KG/M2 | WEIGHT: 243.38 LBS | HEIGHT: 68 IN | DIASTOLIC BLOOD PRESSURE: 88 MMHG | HEART RATE: 72 BPM | SYSTOLIC BLOOD PRESSURE: 142 MMHG

## 2023-11-16 DIAGNOSIS — M79.672 LEFT FOOT PAIN: ICD-10-CM

## 2023-11-16 PROCEDURE — 3044F PR MOST RECENT HEMOGLOBIN A1C LEVEL <7.0%: ICD-10-PCS | Mod: CPTII,S$GLB,, | Performed by: PODIATRIST

## 2023-11-16 PROCEDURE — 3077F SYST BP >= 140 MM HG: CPT | Mod: CPTII,S$GLB,, | Performed by: PODIATRIST

## 2023-11-16 PROCEDURE — 99203 OFFICE O/P NEW LOW 30 MIN: CPT | Mod: S$GLB,,, | Performed by: PODIATRIST

## 2023-11-16 PROCEDURE — 3044F HG A1C LEVEL LT 7.0%: CPT | Mod: CPTII,S$GLB,, | Performed by: PODIATRIST

## 2023-11-16 PROCEDURE — 99203 PR OFFICE/OUTPT VISIT, NEW, LEVL III, 30-44 MIN: ICD-10-PCS | Mod: S$GLB,,, | Performed by: PODIATRIST

## 2023-11-16 PROCEDURE — 1159F PR MEDICATION LIST DOCUMENTED IN MEDICAL RECORD: ICD-10-PCS | Mod: CPTII,S$GLB,, | Performed by: PODIATRIST

## 2023-11-16 PROCEDURE — 99999 PR PBB SHADOW E&M-EST. PATIENT-LVL IV: ICD-10-PCS | Mod: PBBFAC,,, | Performed by: PODIATRIST

## 2023-11-16 PROCEDURE — 3079F PR MOST RECENT DIASTOLIC BLOOD PRESSURE 80-89 MM HG: ICD-10-PCS | Mod: CPTII,S$GLB,, | Performed by: PODIATRIST

## 2023-11-16 PROCEDURE — 1159F MED LIST DOCD IN RCRD: CPT | Mod: CPTII,S$GLB,, | Performed by: PODIATRIST

## 2023-11-16 PROCEDURE — 99999 PR PBB SHADOW E&M-EST. PATIENT-LVL IV: CPT | Mod: PBBFAC,,, | Performed by: PODIATRIST

## 2023-11-16 PROCEDURE — 3008F BODY MASS INDEX DOCD: CPT | Mod: CPTII,S$GLB,, | Performed by: PODIATRIST

## 2023-11-16 PROCEDURE — 3077F PR MOST RECENT SYSTOLIC BLOOD PRESSURE >= 140 MM HG: ICD-10-PCS | Mod: CPTII,S$GLB,, | Performed by: PODIATRIST

## 2023-11-16 PROCEDURE — 3079F DIAST BP 80-89 MM HG: CPT | Mod: CPTII,S$GLB,, | Performed by: PODIATRIST

## 2023-11-16 PROCEDURE — 3008F PR BODY MASS INDEX (BMI) DOCUMENTED: ICD-10-PCS | Mod: CPTII,S$GLB,, | Performed by: PODIATRIST

## 2023-11-16 RX ORDER — METHYLPREDNISOLONE 4 MG/1
TABLET ORAL
Qty: 1 EACH | Refills: 0 | Status: SHIPPED | OUTPATIENT
Start: 2023-11-16 | End: 2023-12-07

## 2023-11-16 NOTE — PROGRESS NOTES
Subjective:      Patient ID: Deepak Tobias is a 48 y.o. male.    Chief Complaint: Foot Pain (L foot pain ) and Diabetes Mellitus    Deepak is a 48 y.o. male who presents to the podiatry clinic  with complaint of  left foot pain. Onset of the symptoms was several months ago. Precipitating event: none known. Current symptoms include: ability to bear weight, but with some pain. Aggravating factors: any weight bearing. Symptoms have waxed and waned but are better overall. Patient has had no prior foot problems. Evaluation to date: none. Treatment to date: none. Patients rates pain 4/10 on pain scale.    Review of Systems   Constitutional: Negative for chills, fever and malaise/fatigue.   HENT:  Negative for hearing loss.    Cardiovascular:  Negative for claudication.   Respiratory:  Negative for shortness of breath.    Skin:  Negative for flushing and rash.   Musculoskeletal:  Negative for joint pain and myalgias.   Neurological:  Negative for loss of balance, numbness, paresthesias and sensory change.   Psychiatric/Behavioral:  Negative for altered mental status.            Objective:      Physical Exam  Vitals reviewed.   Cardiovascular:      Pulses:           Dorsalis pedis pulses are 2+ on the right side and 2+ on the left side.        Posterior tibial pulses are 2+ on the right side and 2+ on the left side.      Comments: No edema noted b/L  Musculoskeletal:         General: Tenderness present.      Comments:   POP to left 5th met base and with eversion of the foot     Feet:      Right foot:      Protective Sensation: 5 sites tested.  5 sites sensed.      Left foot:      Protective Sensation: 5 sites tested.  5 sites sensed.   Skin:     Capillary Refill: Capillary refill takes 2 to 3 seconds.      Comments: Normal skin tugor noted.   No open lesion noted b/L  Skin temp is warm to warm from proximal to distal b/L.  Webspaces clean, dry, and intact     Neurological:      Mental Status: He is alert and oriented to  person, place, and time.      Comments: Gross sensation intact b/L               Assessment:       Encounter Diagnosis   Name Primary?    Left foot pain          Plan:       Deepak was seen today for foot pain and diabetes mellitus.    Diagnoses and all orders for this visit:    Left foot pain  -     Ambulatory referral/consult to Podiatry      I counseled the patient on his conditions, their implications and medical management.    Pt advised that pain is likely due to tendinitis in his foot  Rx medrol dose pack    Pt instructed to purchase OTC Voltaren gel 1%, use on affected area. Pt advised discontinue usage if any adverse effects should occur.   Shoe modification advised for the pt. Pt was advised to obtain shoes will accommodate foot deformities.   Call or return to clinic prn if these symptoms worsen or fail to improve as anticipated.      .

## 2023-11-28 ENCOUNTER — OFFICE VISIT (OUTPATIENT)
Dept: NEPHROLOGY | Facility: CLINIC | Age: 48
End: 2023-11-28
Payer: COMMERCIAL

## 2023-11-28 VITALS — BODY MASS INDEX: 36.37 KG/M2 | WEIGHT: 240 LBS | HEIGHT: 68 IN

## 2023-11-28 DIAGNOSIS — E11.65 TYPE 2 DIABETES MELLITUS WITH HYPERGLYCEMIA, WITHOUT LONG-TERM CURRENT USE OF INSULIN: ICD-10-CM

## 2023-11-28 DIAGNOSIS — N17.9 AKI (ACUTE KIDNEY INJURY): Primary | ICD-10-CM

## 2023-11-28 DIAGNOSIS — E66.01 CLASS 2 SEVERE OBESITY WITH SERIOUS COMORBIDITY AND BODY MASS INDEX (BMI) OF 37.0 TO 37.9 IN ADULT, UNSPECIFIED OBESITY TYPE: ICD-10-CM

## 2023-11-28 DIAGNOSIS — I10 PRIMARY HYPERTENSION: ICD-10-CM

## 2023-11-28 DIAGNOSIS — N18.31 STAGE 3A CHRONIC KIDNEY DISEASE: ICD-10-CM

## 2023-11-28 DIAGNOSIS — R80.1 PERSISTENT PROTEINURIA: ICD-10-CM

## 2023-11-28 PROCEDURE — 3066F NEPHROPATHY DOC TX: CPT | Mod: CPTII,95,, | Performed by: INTERNAL MEDICINE

## 2023-11-28 PROCEDURE — 99205 OFFICE O/P NEW HI 60 MIN: CPT | Mod: 95,,, | Performed by: INTERNAL MEDICINE

## 2023-11-28 PROCEDURE — 3066F PR DOCUMENTATION OF TREATMENT FOR NEPHROPATHY: ICD-10-PCS | Mod: CPTII,95,, | Performed by: INTERNAL MEDICINE

## 2023-11-28 PROCEDURE — 1159F PR MEDICATION LIST DOCUMENTED IN MEDICAL RECORD: ICD-10-PCS | Mod: CPTII,95,, | Performed by: INTERNAL MEDICINE

## 2023-11-28 PROCEDURE — 3008F PR BODY MASS INDEX (BMI) DOCUMENTED: ICD-10-PCS | Mod: CPTII,95,, | Performed by: INTERNAL MEDICINE

## 2023-11-28 PROCEDURE — 99205 PR OFFICE/OUTPT VISIT, NEW, LEVL V, 60-74 MIN: ICD-10-PCS | Mod: 95,,, | Performed by: INTERNAL MEDICINE

## 2023-11-28 PROCEDURE — 3044F PR MOST RECENT HEMOGLOBIN A1C LEVEL <7.0%: ICD-10-PCS | Mod: CPTII,95,, | Performed by: INTERNAL MEDICINE

## 2023-11-28 PROCEDURE — 3044F HG A1C LEVEL LT 7.0%: CPT | Mod: CPTII,95,, | Performed by: INTERNAL MEDICINE

## 2023-11-28 PROCEDURE — 3008F BODY MASS INDEX DOCD: CPT | Mod: CPTII,95,, | Performed by: INTERNAL MEDICINE

## 2023-11-28 PROCEDURE — 1159F MED LIST DOCD IN RCRD: CPT | Mod: CPTII,95,, | Performed by: INTERNAL MEDICINE

## 2023-11-28 NOTE — PROGRESS NOTES
Subjective:      Patient ID: Deepak Tobias is a 48 y.o. Black or  male who presents for new evaluation of Chronic Kidney Disease    HPI    Nov 2023:  The patient location is: [place]  The chief complaint leading to consultation is: CKD  Visit type: audiovisual  65 minutes of total time spent on the encounter, which includes face to face time and non-face to face time preparing to see the patient (eg, review of tests), Obtaining and/or reviewing separately obtained history, Documenting clinical information in the electronic or other health record, Independently interpreting results (not separately reported) and communicating results to the patient/family/caregiver, or Care coordination (not separately reported).   Each patient to whom he or she provides medical services by telemedicine is:  (1) informed of the relationship between the physician and patient and the respective role of any other health care provider with respect to management of the patient; and (2) notified that he or she may decline to receive medical services by telemedicine and may withdraw from such care at any time.  Notes: He is referred by his PCP for CKD, slowly progressive in the setting of DM2, obesity and HTN. More recent creatinine levels are measured 1.5-1.8mg/dL. Blood sugars are very well controlled. 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   HENT:  Negative for facial swelling.    Respiratory:  Negative for shortness of breath.    Genitourinary:  Positive for difficulty urinating (notes change in urinatrion when back on Aldactone, and initial).   Musculoskeletal:  Positive for arthralgias.   Skin:  Positive for rash (hair loss with rapid weight loss with use of Ozempic).   Allergic/Immunologic: Positive for immunocompromised state (HTN DM).   Neurological:  Negative for weakness.   Psychiatric/Behavioral:  Negative for confusion and decreased concentration.       Objective:     Physical Exam  Constitutional:       General: He is not in acute distress.     Appearance: He is normal weight. He is not ill-appearing.   HENT:      Head: Atraumatic.   Eyes:      General: No scleral icterus.  Pulmonary:      Effort: Pulmonary effort is normal. No respiratory distress.   Neurological:      Mental Status: He is alert and oriented to person, place, and time.   Psychiatric:         Mood and Affect: Mood normal.     Assessment:     1. NOEMY (acute kidney injury)    2. Stage 3a chronic kidney disease    3. Type 2 diabetes mellitus with hyperglycemia, without long-term current use of insulin    4. Primary hypertension    5. Class 2 severe obesity with serious comorbidity and body mass index (BMI) of 37.0 to 37.9 in adult, unspecified obesity type    6. Persistent proteinuria       Plan:       NOEMY on CKD   - he stopped NSAIDs 11/13 per PCP recommendations and will have him increase hydration, then repeat chemistries   - would prefer one HTN diuretic, will discuss with PCP    CKD  - in the setting of obesity, HTN and hyperglycemia  - he will undergo work up with serologies, given his multiple arthralgias will check inflammatory markers as well  - he exhibits microalbuminuria, already maintained on an ARB plus Aldactone  - he may benefit from SGLT2i   - renal u/s reviewed     HTN  - goal SBP  <130 and 80 or less      Labs--post results

## 2024-01-01 ENCOUNTER — PATIENT MESSAGE (OUTPATIENT)
Dept: NEPHROLOGY | Facility: CLINIC | Age: 49
End: 2024-01-01
Payer: COMMERCIAL

## 2024-01-02 RX ORDER — VALSARTAN AND HYDROCHLOROTHIAZIDE 320; 25 MG/1; MG/1
1 TABLET, FILM COATED ORAL DAILY
Qty: 90 TABLET | Refills: 1 | Status: SHIPPED | OUTPATIENT
Start: 2024-01-02 | End: 2024-01-08

## 2024-01-02 NOTE — TELEPHONE ENCOUNTER
Refilled BOP med but noted I have not received labs from Nov 2023--was it outside lab? Or he did not get labs

## 2024-01-08 RX ORDER — VALSARTAN 320 MG/1
320 TABLET ORAL DAILY
Qty: 90 TABLET | Refills: 3 | Status: SHIPPED | OUTPATIENT
Start: 2024-01-08 | End: 2024-01-09 | Stop reason: SDUPTHER

## 2024-01-08 NOTE — TELEPHONE ENCOUNTER
A message was sent to patient with updates on medication changes. Labs were scheduled for this week and return to clinic in March.

## 2024-01-08 NOTE — TELEPHONE ENCOUNTER
At his clinic consult visit we discussed stopping HCTZ since he is on spironolactone. As a courtesy I checked with PCP about medication changes with valsartan/HCTZ. I will send valsartan alone to preferred pharmacy.     Lab orders were placed 11/28/23--please schedule those --post results and he should message with questions     RTC 1-2mo

## 2024-01-09 RX ORDER — VALSARTAN 320 MG/1
320 TABLET ORAL DAILY
Qty: 90 TABLET | Refills: 3 | Status: SHIPPED | OUTPATIENT
Start: 2024-01-09 | End: 2025-01-08

## 2024-01-10 ENCOUNTER — LAB VISIT (OUTPATIENT)
Dept: LAB | Facility: HOSPITAL | Age: 49
End: 2024-01-10
Attending: INTERNAL MEDICINE
Payer: COMMERCIAL

## 2024-01-10 DIAGNOSIS — I10 PRIMARY HYPERTENSION: ICD-10-CM

## 2024-01-10 DIAGNOSIS — R80.1 PERSISTENT PROTEINURIA: ICD-10-CM

## 2024-01-10 DIAGNOSIS — N18.31 STAGE 3A CHRONIC KIDNEY DISEASE: ICD-10-CM

## 2024-01-10 LAB
ALBUMIN SERPL BCP-MCNC: 4 G/DL (ref 3.5–5.2)
ANION GAP SERPL CALC-SCNC: 9 MMOL/L (ref 8–16)
BUN SERPL-MCNC: 21 MG/DL (ref 6–20)
CALCIUM SERPL-MCNC: 9.9 MG/DL (ref 8.7–10.5)
CHLORIDE SERPL-SCNC: 105 MMOL/L (ref 95–110)
CO2 SERPL-SCNC: 25 MMOL/L (ref 23–29)
CREAT SERPL-MCNC: 1.6 MG/DL (ref 0.5–1.4)
CRP SERPL-MCNC: 1.8 MG/L (ref 0–8.2)
EST. GFR  (NO RACE VARIABLE): 52.8 ML/MIN/1.73 M^2
GLUCOSE SERPL-MCNC: 102 MG/DL (ref 70–110)
HBV SURFACE AG SERPL QL IA: NORMAL
HIV 1+2 AB+HIV1 P24 AG SERPL QL IA: NORMAL
PHOSPHATE SERPL-MCNC: 3.8 MG/DL (ref 2.7–4.5)
POTASSIUM SERPL-SCNC: 3.9 MMOL/L (ref 3.5–5.1)
RHEUMATOID FACT SERPL-ACNC: <13 IU/ML (ref 0–15)
SODIUM SERPL-SCNC: 139 MMOL/L (ref 136–145)
URATE SERPL-MCNC: 5.5 MG/DL (ref 3.4–7)

## 2024-01-10 PROCEDURE — 86140 C-REACTIVE PROTEIN: CPT | Performed by: INTERNAL MEDICINE

## 2024-01-10 PROCEDURE — 86038 ANTINUCLEAR ANTIBODIES: CPT | Performed by: INTERNAL MEDICINE

## 2024-01-10 PROCEDURE — 84165 PROTEIN E-PHORESIS SERUM: CPT | Performed by: INTERNAL MEDICINE

## 2024-01-10 PROCEDURE — 86431 RHEUMATOID FACTOR QUANT: CPT | Performed by: INTERNAL MEDICINE

## 2024-01-10 PROCEDURE — 84165 PROTEIN E-PHORESIS SERUM: CPT | Mod: 26,,, | Performed by: PATHOLOGY

## 2024-01-10 PROCEDURE — 84550 ASSAY OF BLOOD/URIC ACID: CPT | Performed by: INTERNAL MEDICINE

## 2024-01-10 PROCEDURE — 80069 RENAL FUNCTION PANEL: CPT | Performed by: INTERNAL MEDICINE

## 2024-01-10 PROCEDURE — 36415 COLL VENOUS BLD VENIPUNCTURE: CPT | Mod: PN | Performed by: INTERNAL MEDICINE

## 2024-01-10 PROCEDURE — 87389 HIV-1 AG W/HIV-1&-2 AB AG IA: CPT | Performed by: INTERNAL MEDICINE

## 2024-01-10 PROCEDURE — 87340 HEPATITIS B SURFACE AG IA: CPT | Performed by: INTERNAL MEDICINE

## 2024-01-10 PROCEDURE — 85652 RBC SED RATE AUTOMATED: CPT | Performed by: INTERNAL MEDICINE

## 2024-01-11 LAB
ALBUMIN SERPL ELPH-MCNC: 4.23 G/DL (ref 3.35–5.55)
ALPHA1 GLOB SERPL ELPH-MCNC: 0.25 G/DL (ref 0.17–0.41)
ALPHA2 GLOB SERPL ELPH-MCNC: 0.64 G/DL (ref 0.43–0.99)
ANA SER QL IF: NORMAL
B-GLOBULIN SERPL ELPH-MCNC: 0.81 G/DL (ref 0.5–1.1)
ERYTHROCYTE [SEDIMENTATION RATE] IN BLOOD BY PHOTOMETRIC METHOD: 15 MM/HR (ref 0–23)
GAMMA GLOB SERPL ELPH-MCNC: 1.27 G/DL (ref 0.67–1.58)
PROT SERPL-MCNC: 7.2 G/DL (ref 6–8.4)

## 2024-01-15 LAB — PATHOLOGIST INTERPRETATION SPE: NORMAL

## 2024-01-18 ENCOUNTER — OFFICE VISIT (OUTPATIENT)
Dept: PODIATRY | Facility: CLINIC | Age: 49
End: 2024-01-18
Payer: COMMERCIAL

## 2024-01-18 ENCOUNTER — APPOINTMENT (OUTPATIENT)
Dept: RADIOLOGY | Facility: OTHER | Age: 49
End: 2024-01-18
Attending: PODIATRIST
Payer: COMMERCIAL

## 2024-01-18 VITALS
BODY MASS INDEX: 36.37 KG/M2 | HEART RATE: 62 BPM | WEIGHT: 240 LBS | DIASTOLIC BLOOD PRESSURE: 87 MMHG | HEIGHT: 68 IN | SYSTOLIC BLOOD PRESSURE: 153 MMHG

## 2024-01-18 DIAGNOSIS — M24.573 EQUINUS CONTRACTURE OF ANKLE: ICD-10-CM

## 2024-01-18 DIAGNOSIS — M79.672 LEFT FOOT PAIN: Primary | ICD-10-CM

## 2024-01-18 DIAGNOSIS — M77.9 ENTHESOPATHY: ICD-10-CM

## 2024-01-18 DIAGNOSIS — S96.912A STRAIN OF ANKLE AND FOOT, LEFT, INITIAL ENCOUNTER: ICD-10-CM

## 2024-01-18 DIAGNOSIS — M79.672 LEFT FOOT PAIN: ICD-10-CM

## 2024-01-18 PROCEDURE — 73630 X-RAY EXAM OF FOOT: CPT | Mod: TC,PN,LT

## 2024-01-18 PROCEDURE — 99999 PR PBB SHADOW E&M-EST. PATIENT-LVL III: CPT | Mod: PBBFAC,,, | Performed by: PODIATRIST

## 2024-01-18 PROCEDURE — 99214 OFFICE O/P EST MOD 30 MIN: CPT | Mod: 25,S$GLB,, | Performed by: PODIATRIST

## 2024-01-18 PROCEDURE — 29540 STRAPPING ANKLE &/FOOT: CPT | Mod: LT,S$GLB,, | Performed by: PODIATRIST

## 2024-01-18 PROCEDURE — 73630 X-RAY EXAM OF FOOT: CPT | Mod: 26,LT,, | Performed by: RADIOLOGY

## 2024-01-18 RX ORDER — LIDOCAINE AND PRILOCAINE 25; 25 MG/G; MG/G
CREAM TOPICAL NIGHTLY
Qty: 5 G | Refills: 3 | Status: SHIPPED | OUTPATIENT
Start: 2024-01-18

## 2024-01-18 NOTE — PROGRESS NOTES
Subjective:      Patient ID: Deepak Tobias is a 48 y.o. male.    Chief Complaint: Foot Problem (I believe I have a cuboid injury L foot)    Sharp deep pain intermittently the lateral border of the left foot indicates styloid process with index finger.  This is a chronic condition that is present for greater than 5 months periods of improvement and worsening.  This exacerbations been gradual onset worsening over the past few weeks aggravated with increased weight-bearing prolonged standing some shoes.  Prior evaluation November 2023 yielded x-rays.  Independent interpretation same shows no acute injury lateral left midfoot.  Patient denies trauma and surgery right foot since the original offending incident  Stepping funny off of a curb.    Review of Systems   Constitutional: Negative for chills, diaphoresis, fever, malaise/fatigue and night sweats.   Cardiovascular:  Negative for claudication, cyanosis, leg swelling and syncope.   Skin:  Negative for color change, dry skin, nail changes, rash, suspicious lesions and unusual hair distribution.   Musculoskeletal:  Negative for falls, joint pain, joint swelling, muscle cramps, muscle weakness and stiffness.   Gastrointestinal:  Negative for constipation, diarrhea, nausea and vomiting.   Neurological:  Negative for brief paralysis, disturbances in coordination, focal weakness, numbness, paresthesias, sensory change and tremors.         Objective:      Physical Exam  Constitutional:       General: He is not in acute distress.     Appearance: He is well-developed. He is not diaphoretic.   Cardiovascular:      Pulses:           Popliteal pulses are 2+ on the right side and 2+ on the left side.        Dorsalis pedis pulses are 2+ on the right side and 2+ on the left side.        Posterior tibial pulses are 2+ on the right side and 2+ on the left side.      Comments: Capillary refill 3 seconds all toes/distal feet, all toes/both feet warm to touch.      Negative  lymphadenopathy bilateral popliteal fossa and tarsal tunnel.      Negavie lower extremity edema bilateral.    Musculoskeletal:      Right ankle: No swelling, deformity, ecchymosis or lacerations. Normal range of motion. Normal pulse.      Right Achilles Tendon: Normal. No defects. Hernandez's test negative.      Comments:   Sharp deep pain to palpation styloid process of the insertion of the peroneus brevis tendon of the left foot without gross deformity loss of function or signs of acute trauma.      Ankle dorsiflexion decreased at <10 degrees bilateral with moderate increase with knee flexion bilateral.    Otherwise, Normal angle, base, station of gait. All ten toes without clubbing, cyanosis, or signs of ischemia.  No pain to palpation bilateral lower extremities.  Range of motion, stability, muscle strength, and muscle tone normal bilateral feet and legs.    Lymphadenopathy:      Lower Body: No right inguinal adenopathy. No left inguinal adenopathy.      Comments: Negative lymphadenopathy bilateral popliteal fossa and tarsal tunnel.    Negative lymphangitic streaking bilateral feet/ankles/legs.   Skin:     General: Skin is warm and dry.      Capillary Refill: Capillary refill takes 2 to 3 seconds.      Coloration: Skin is not pale.      Findings: No abrasion, bruising, burn, ecchymosis, erythema, laceration, lesion or rash.      Nails: There is no clubbing.      Comments: Skin is normal age and health appropriate color, turgor, texture, and temperature bilateral lower extremities without ulceration, hyperpigmentation, discoloration, masses nodules or cords palpated.  No ecchymosis, erythema, edema, or cardinal signs of infection bilateral lower extremities.       Neurological:      Mental Status: He is alert and oriented to person, place, and time.      Sensory: No sensory deficit.      Motor: No tremor, atrophy or abnormal muscle tone.      Gait: Gait normal.      Comments: Negative tinel sign to percussion  sural, superficial peroneal, deep peroneal, saphenous, and posterior tibial nerves right and left ankles and feet.     Psychiatric:         Behavior: Behavior is cooperative.           Assessment:       Encounter Diagnoses   Name Primary?    Left foot pain Yes    Enthesopathy     Strain of ankle and foot, left, initial encounter     Equinus contracture of ankle          Plan:       Deepak was seen today for foot problem.    Diagnoses and all orders for this visit:    Left foot pain  -     X-Ray Foot Complete Left; Future    Enthesopathy  -     X-Ray Foot Complete Left; Future    Strain of ankle and foot, left, initial encounter  -     X-Ray Foot Complete Left; Future    Equinus contracture of ankle  -     X-Ray Foot Complete Left; Future    Other orders  -     LIDOcaine-prilocaine (EMLA) cream; Apply topically nightly.      I counseled the patient on his conditions, their implications and medical management.        Patient will stretch the tendo achilles complex three times daily as demonstrated in the office.  Literature was dispensed illustrating proper stretching technique.    I applied a plantar rest strapping to the patient's left foot to offload symptomatic area, support the arch, and relieve pain.    Patient will obtain over the counter arch supports and wear them in shoes whenever possible.  Athletic shoes intended for walking or running are usually best.    The patient was advised that NSAID-type medications have two very important potential side effects: gastrointestinal irritation including hemorrhage and renal injuries. He was asked to take the medication with food and to stop if he experiences any GI upset. I asked him to call for vomiting, abdominal pain or black/bloody stools. The patient expresses understanding of these issues and questions were answered.    Discussed conservative treatment with shoes of adequate dimensions, material, and style to alleviate symptoms and delay or prevent surgical  intervention.    [facility], xrays, 1 month          Follow up in about 1 month (around 2/18/2024).

## 2024-02-20 ENCOUNTER — OFFICE VISIT (OUTPATIENT)
Dept: PODIATRY | Facility: CLINIC | Age: 49
End: 2024-02-20
Payer: COMMERCIAL

## 2024-02-20 VITALS
SYSTOLIC BLOOD PRESSURE: 151 MMHG | DIASTOLIC BLOOD PRESSURE: 87 MMHG | HEIGHT: 68 IN | HEART RATE: 54 BPM | BODY MASS INDEX: 36.37 KG/M2 | WEIGHT: 240 LBS

## 2024-02-20 DIAGNOSIS — M77.9 ENTHESOPATHY: ICD-10-CM

## 2024-02-20 DIAGNOSIS — M24.573 EQUINUS CONTRACTURE OF ANKLE: ICD-10-CM

## 2024-02-20 DIAGNOSIS — M79.672 LEFT FOOT PAIN: Primary | ICD-10-CM

## 2024-02-20 DIAGNOSIS — S96.912A STRAIN OF ANKLE AND FOOT, LEFT, INITIAL ENCOUNTER: ICD-10-CM

## 2024-02-20 PROCEDURE — 99213 OFFICE O/P EST LOW 20 MIN: CPT | Mod: S$GLB,,, | Performed by: PODIATRIST

## 2024-02-20 PROCEDURE — 99999 PR PBB SHADOW E&M-EST. PATIENT-LVL IV: CPT | Mod: PBBFAC,,, | Performed by: PODIATRIST

## 2024-02-20 NOTE — PROGRESS NOTES
Subjective:      Patient ID: Deepak Tobias is a 48 y.o. male.    Chief Complaint: Follow-up (Foot pain)    Sharp deep pain intermittently the lateral border of the left foot indicates styloid process with index finger.  This is a chronic condition that is present for greater than 5 months periods of improvement and worsening.  This exacerbations been gradual onset improving well over the past few weeks aggravated with increased weight-bearing prolonged standing some shoes.  Prior evaluation November 2023 yielded x-rays.  Independent interpretation same shows no acute injury lateral left midfoot.  Patient denies trauma and surgery right foot since the original offending incident  Stepping funny off of a curb.  X-rays 01/18/2024 show no acute injury the styloid process area but rather prominent area in that location    Review of Systems   Constitutional: Negative for chills, diaphoresis, fever, malaise/fatigue and night sweats.   Cardiovascular:  Negative for claudication, cyanosis, leg swelling and syncope.   Skin:  Negative for color change, dry skin, nail changes, rash, suspicious lesions and unusual hair distribution.   Musculoskeletal:  Negative for falls, joint pain, joint swelling, muscle cramps, muscle weakness and stiffness.   Gastrointestinal:  Negative for constipation, diarrhea, nausea and vomiting.   Neurological:  Negative for brief paralysis, disturbances in coordination, focal weakness, numbness, paresthesias, sensory change and tremors.           Objective:      Physical Exam  Constitutional:       General: He is not in acute distress.     Appearance: He is well-developed. He is not diaphoretic.   Cardiovascular:      Pulses:           Popliteal pulses are 2+ on the right side and 2+ on the left side.        Dorsalis pedis pulses are 2+ on the right side and 2+ on the left side.        Posterior tibial pulses are 2+ on the right side and 2+ on the left side.      Comments: Capillary refill 3  seconds all toes/distal feet, all toes/both feet warm to touch.      Negative lymphadenopathy bilateral popliteal fossa and tarsal tunnel.      Negavie lower extremity edema bilateral.    Musculoskeletal:      Right ankle: No swelling, deformity, ecchymosis or lacerations. Normal range of motion. Normal pulse.      Right Achilles Tendon: Normal. No defects. Hernandez's test negative.      Comments:   No current pain to palpation styloid process of the insertion of the peroneus brevis tendon of the left foot without gross deformity loss of function or signs of acute trauma.      Ankle dorsiflexion decreased at <10 degrees bilateral with moderate increase with knee flexion bilateral.    Otherwise, Normal angle, base, station of gait. All ten toes without clubbing, cyanosis, or signs of ischemia.  No pain to palpation bilateral lower extremities.  Range of motion, stability, muscle strength, and muscle tone normal bilateral feet and legs.    Lymphadenopathy:      Lower Body: No right inguinal adenopathy. No left inguinal adenopathy.      Comments: Negative lymphadenopathy bilateral popliteal fossa and tarsal tunnel.    Negative lymphangitic streaking bilateral feet/ankles/legs.   Skin:     General: Skin is warm and dry.      Capillary Refill: Capillary refill takes 2 to 3 seconds.      Coloration: Skin is not pale.      Findings: No abrasion, bruising, burn, ecchymosis, erythema, laceration, lesion or rash.      Nails: There is no clubbing.      Comments: Skin is normal age and health appropriate color, turgor, texture, and temperature bilateral lower extremities without ulceration, hyperpigmentation, discoloration, masses nodules or cords palpated.  No ecchymosis, erythema, edema, or cardinal signs of infection bilateral lower extremities.       Neurological:      Mental Status: He is alert and oriented to person, place, and time.      Sensory: No sensory deficit.      Motor: No tremor, atrophy or abnormal muscle tone.       Gait: Gait normal.      Comments: Negative tinel sign to percussion sural, superficial peroneal, deep peroneal, saphenous, and posterior tibial nerves right and left ankles and feet.     Psychiatric:         Behavior: Behavior is cooperative.             Assessment:       Encounter Diagnoses   Name Primary?    Left foot pain Yes    Enthesopathy     Equinus contracture of ankle     Strain of ankle and foot, left, initial encounter          Plan:       Deepak was seen today for follow-up.    Diagnoses and all orders for this visit:    Left foot pain    Enthesopathy    Equinus contracture of ankle    Strain of ankle and foot, left, initial encounter      I counseled the patient on his conditions, their implications and medical management.        Continue stretches, inserts, athletic shoes, activity to tolerance.      Patient is cleared to gradually returned to increased activity level as tolerated.    Declines injection physical therapy custom orthotics and night splint pending continued improvement and resolution          No follow-ups on file.

## 2024-02-21 DIAGNOSIS — E11.9 TYPE 2 DIABETES MELLITUS WITHOUT COMPLICATION: ICD-10-CM

## 2024-02-22 DIAGNOSIS — E11.65 TYPE 2 DIABETES MELLITUS WITH HYPERGLYCEMIA, WITHOUT LONG-TERM CURRENT USE OF INSULIN: ICD-10-CM

## 2024-02-23 RX ORDER — SEMAGLUTIDE 1.34 MG/ML
1 INJECTION, SOLUTION SUBCUTANEOUS
Qty: 3 EACH | Refills: 3 | Status: SHIPPED | OUTPATIENT
Start: 2024-02-23

## 2024-02-23 NOTE — TELEPHONE ENCOUNTER
Care Due:                  Date            Visit Type   Department     Provider  --------------------------------------------------------------------------------                                EP -                              PRIMARY      [facility] INTERNAL  Last Visit: 11-      CARE ([facility])   ZOFIA Alexander                               -                              PRIMARY      [facility] INTERNAL  Next Visit: 05-      CARE ([facility])   [facility]       Edis Alexander                                                            Last  Test          Frequency    Reason                     Performed    Due Date  --------------------------------------------------------------------------------    HBA1C.......  6 months...  semaglutide..............  11- 05-    [facility] Embedded Care Due Messages. Reference number: [id number].   2/22/2024 7:33:41 PM CST

## 2024-02-26 ENCOUNTER — PATIENT MESSAGE (OUTPATIENT)
Dept: ADMINISTRATIVE | Facility: HOSPITAL | Age: 49
End: 2024-02-26
Payer: COMMERCIAL

## 2024-03-04 PROBLEM — N17.9 AKI (ACUTE KIDNEY INJURY): Status: RESOLVED | Noted: 2023-11-28 | Resolved: 2024-03-04

## 2024-03-19 ENCOUNTER — OFFICE VISIT (OUTPATIENT)
Dept: NEPHROLOGY | Facility: CLINIC | Age: 49
End: 2024-03-19
Payer: COMMERCIAL

## 2024-03-19 VITALS — WEIGHT: 240.06 LBS | BODY MASS INDEX: 36.38 KG/M2 | HEIGHT: 68 IN

## 2024-03-19 DIAGNOSIS — E11.65 TYPE 2 DIABETES MELLITUS WITH HYPERGLYCEMIA, WITHOUT LONG-TERM CURRENT USE OF INSULIN: ICD-10-CM

## 2024-03-19 DIAGNOSIS — E55.9 VITAMIN D DEFICIENCY: ICD-10-CM

## 2024-03-19 DIAGNOSIS — E66.9 OBESITY (BMI 30-39.9): ICD-10-CM

## 2024-03-19 DIAGNOSIS — N18.31 STAGE 3A CHRONIC KIDNEY DISEASE: Primary | ICD-10-CM

## 2024-03-19 DIAGNOSIS — I10 PRIMARY HYPERTENSION: ICD-10-CM

## 2024-03-19 DIAGNOSIS — R80.9 MICROALBUMINURIA: ICD-10-CM

## 2024-03-19 PROCEDURE — 99215 OFFICE O/P EST HI 40 MIN: CPT | Mod: 95,,, | Performed by: INTERNAL MEDICINE

## 2024-03-19 PROCEDURE — G2211 COMPLEX E/M VISIT ADD ON: HCPCS | Mod: 95,,, | Performed by: INTERNAL MEDICINE

## 2024-03-19 NOTE — PROGRESS NOTES
Subjective:      Patient ID: Deepak Tobias is a 48 y.o. Black or  male who presents for follow up evaluation of Chronic Kidney Disease    HPI    Nov 2023:  The patient location is: [place]  The chief complaint leading to consultation is: CKD  Visit type: audiovisual  65 minutes of total time spent on the encounter, which includes face to face time and non-face to face time preparing to see the patient (eg, review of tests), Obtaining and/or reviewing separately obtained history, Documenting clinical information in the electronic or other health record, Independently interpreting results (not separately reported) and communicating results to the patient/family/caregiver, or Care coordination (not separately reported).   Each patient to whom he or she provides medical services by telemedicine is:  (1) informed of the relationship between the physician and patient and the respective role of any other health care provider with respect to management of the patient; and (2) notified that he or she may decline to receive medical services by telemedicine and may withdraw from such care at any time.  Notes: He is referred by his PCP for CKD, slowly progressive in the setting of DM2, obesity and HTN. More recent creatinine levels are measured 1.5-1.8mg/dL. Blood sugars are very well controlled. 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)   303lbs down 220lbs before foot injury    Was off Norvasc due to gums per dentist but needed to resume as BP became uncontrolled   Was on 800mg ibuprofen BID for foot and shoulder pain since mid September  Kidney stones history--fresh lemonade   When he had COVID 4/2020  with NOEMY     March 2024:  The patient location is: [place]  The chief complaint leading to consultation is: CKD  Visit type: audiovisual  45 minutes of total time spent on the encounter, which includes face to face time and non-face to face time preparing to see the patient (eg, review of tests), Obtaining and/or reviewing separately obtained history, Documenting clinical information in the electronic or other health record, Independently interpreting results (not separately reported) and communicating results to the patient/family/caregiver, or Care coordination (not separately reported).   Each patient to whom he or she provides medical services by telemedicine is:  (1) informed of the relationship between the physician and patient and the respective role of any other health care provider with respect to management of the patient; and (2) notified that he or she may decline to receive medical services by telemedicine and may withdraw from such care at any time.  Notes: Here to review NOEMY CKD work up. Passed kidney stone in the past. Gained 22 lbs in a year due to foot injury--might be cleared for exercise late Spring. Decaf tea increase! No further NSAIDs. Mom sarcoid>>txp kidney       Review of Systems   Musculoskeletal:  Positive for arthralgias.   Allergic/Immunologic: Positive for immunocompromised state.      Objective:     Physical Exam  Constitutional:       General: He is not in acute distress.     Appearance: He is normal weight. He is not ill-appearing.   HENT:      Head: Atraumatic.   Eyes:      General: No scleral icterus.  Pulmonary:      Effort: Pulmonary effort is normal. No respiratory distress.   Neurological:      Mental Status: He is alert and oriented to person, place, and time.   Psychiatric:         Mood and Affect: Mood normal.       Assessment:     1. Stage 3a chronic kidney disease    2. Obesity (BMI 30-39.9)    3. Type 2 diabetes mellitus with hyperglycemia, without long-term current use of  insulin    4. Primary hypertension    5. Microalbuminuria         Plan:       NOEMY on CKD   - appears to be CKD but hopeful improvement with next set of labs     CKD Styage 3a  - in the setting of obesity, HTN and hyperglycemia  - serologies WNL  - he exhibits microalbuminuria, already maintained on an ARB plus Aldactone  - he may benefit from SGLT2i   - renal u/s reviewed>The patient has a history of of kidney stone cauing hydronephrosis>>low sodiuma nd hydration, fresh lemon, check renal u/s next year     HTN  - goal SBP <130 and 80 or less  -  at home currently, start with exercise first as will need a new class of meds (maxed out on current)   - off HCTZ      RTC June with labs linked to May

## 2024-03-27 DIAGNOSIS — E11.9 TYPE 2 DIABETES MELLITUS WITHOUT COMPLICATION, UNSPECIFIED WHETHER LONG TERM INSULIN USE: ICD-10-CM

## 2024-04-02 NOTE — TELEPHONE ENCOUNTER
No care due was identified.  [facility] Embedded Care Due Messages. Reference number: [id number].   4/02/2024 1:31:33 PM CDT

## 2024-04-03 NOTE — TELEPHONE ENCOUNTER
Refill Routing Note   Medication(s) are not appropriate for processing by [facility] for the following reason(s):        Required vitals abnormal  Required labs abnormal    [facility] action(s):  Defer               Appointments  past 12m or future 3m with PCP    Date Provider   Last Visit   11/13/2023 Edis Alexander Jr., MD   Next Visit   5/16/2024 Edis Alexander Jr., MD   ED visits in past 90 days: 0        Note composed:3:25 PM 04/03/2024           WDL

## 2024-04-04 RX ORDER — SPIRONOLACTONE 25 MG/1
25 TABLET ORAL DAILY
Qty: 90 TABLET | Refills: 1 | Status: SHIPPED | OUTPATIENT
Start: 2024-04-04

## 2024-05-07 ENCOUNTER — LAB VISIT (OUTPATIENT)
Dept: LAB | Facility: HOSPITAL | Age: 49
End: 2024-05-07
Attending: INTERNAL MEDICINE
Payer: COMMERCIAL

## 2024-05-07 DIAGNOSIS — E11.65 TYPE 2 DIABETES MELLITUS WITH HYPERGLYCEMIA, WITHOUT LONG-TERM CURRENT USE OF INSULIN: ICD-10-CM

## 2024-05-07 DIAGNOSIS — E55.9 VITAMIN D DEFICIENCY: ICD-10-CM

## 2024-05-07 DIAGNOSIS — N18.31 STAGE 3A CHRONIC KIDNEY DISEASE: ICD-10-CM

## 2024-05-07 LAB
25(OH)D3+25(OH)D2 SERPL-MCNC: 33 NG/ML (ref 30–96)
ALBUMIN SERPL BCP-MCNC: 3.9 G/DL (ref 3.5–5.2)
ALBUMIN SERPL BCP-MCNC: 3.9 G/DL (ref 3.5–5.2)
ALP SERPL-CCNC: 53 U/L (ref 55–135)
ALT SERPL W/O P-5'-P-CCNC: 38 U/L (ref 10–44)
ANION GAP SERPL CALC-SCNC: 8 MMOL/L (ref 8–16)
ANION GAP SERPL CALC-SCNC: 8 MMOL/L (ref 8–16)
AST SERPL-CCNC: 26 U/L (ref 10–40)
BILIRUB SERPL-MCNC: 0.6 MG/DL (ref 0.1–1)
BUN SERPL-MCNC: 23 MG/DL (ref 6–20)
BUN SERPL-MCNC: 23 MG/DL (ref 6–20)
CALCIUM SERPL-MCNC: 9.7 MG/DL (ref 8.7–10.5)
CALCIUM SERPL-MCNC: 9.7 MG/DL (ref 8.7–10.5)
CHLORIDE SERPL-SCNC: 110 MMOL/L (ref 95–110)
CHLORIDE SERPL-SCNC: 110 MMOL/L (ref 95–110)
CO2 SERPL-SCNC: 22 MMOL/L (ref 23–29)
CO2 SERPL-SCNC: 22 MMOL/L (ref 23–29)
CREAT SERPL-MCNC: 1.5 MG/DL (ref 0.5–1.4)
CREAT SERPL-MCNC: 1.5 MG/DL (ref 0.5–1.4)
EST. GFR  (NO RACE VARIABLE): 57.1 ML/MIN/1.73 M^2
EST. GFR  (NO RACE VARIABLE): 57.1 ML/MIN/1.73 M^2
ESTIMATED AVG GLUCOSE: 91 MG/DL (ref 68–131)
GLUCOSE SERPL-MCNC: 97 MG/DL (ref 70–110)
GLUCOSE SERPL-MCNC: 97 MG/DL (ref 70–110)
HBA1C MFR BLD: 4.8 % (ref 4–5.6)
PHOSPHATE SERPL-MCNC: 3 MG/DL (ref 2.7–4.5)
POTASSIUM SERPL-SCNC: 4.2 MMOL/L (ref 3.5–5.1)
POTASSIUM SERPL-SCNC: 4.2 MMOL/L (ref 3.5–5.1)
PROT SERPL-MCNC: 7.3 G/DL (ref 6–8.4)
PTH-INTACT SERPL-MCNC: 96.7 PG/ML (ref 9–77)
SODIUM SERPL-SCNC: 140 MMOL/L (ref 136–145)
SODIUM SERPL-SCNC: 140 MMOL/L (ref 136–145)

## 2024-05-07 PROCEDURE — 83036 HEMOGLOBIN GLYCOSYLATED A1C: CPT | Performed by: INTERNAL MEDICINE

## 2024-05-07 PROCEDURE — 83970 ASSAY OF PARATHORMONE: CPT | Performed by: INTERNAL MEDICINE

## 2024-05-07 PROCEDURE — 80053 COMPREHEN METABOLIC PANEL: CPT | Performed by: INTERNAL MEDICINE

## 2024-05-07 PROCEDURE — 36415 COLL VENOUS BLD VENIPUNCTURE: CPT | Mod: PN | Performed by: INTERNAL MEDICINE

## 2024-05-07 PROCEDURE — 84100 ASSAY OF PHOSPHORUS: CPT | Performed by: INTERNAL MEDICINE

## 2024-05-07 PROCEDURE — 82306 VITAMIN D 25 HYDROXY: CPT | Performed by: INTERNAL MEDICINE

## 2024-05-12 ENCOUNTER — PATIENT MESSAGE (OUTPATIENT)
Dept: INTERNAL MEDICINE | Facility: CLINIC | Age: 49
End: 2024-05-12
Payer: COMMERCIAL

## 2024-05-14 ENCOUNTER — TELEPHONE (OUTPATIENT)
Dept: NEPHROLOGY | Facility: CLINIC | Age: 49
End: 2024-05-14

## 2024-05-14 ENCOUNTER — OFFICE VISIT (OUTPATIENT)
Dept: NEPHROLOGY | Facility: CLINIC | Age: 49
End: 2024-05-14
Payer: COMMERCIAL

## 2024-05-14 VITALS — HEIGHT: 68 IN | WEIGHT: 245 LBS | BODY MASS INDEX: 37.13 KG/M2

## 2024-05-14 DIAGNOSIS — N18.31 STAGE 3A CHRONIC KIDNEY DISEASE: Primary | ICD-10-CM

## 2024-05-14 DIAGNOSIS — I10 PRIMARY HYPERTENSION: ICD-10-CM

## 2024-05-14 DIAGNOSIS — E11.65 TYPE 2 DIABETES MELLITUS WITH HYPERGLYCEMIA, WITHOUT LONG-TERM CURRENT USE OF INSULIN: ICD-10-CM

## 2024-05-14 DIAGNOSIS — R80.9 MICROALBUMINURIA: ICD-10-CM

## 2024-05-14 DIAGNOSIS — E66.9 OBESITY (BMI 30-39.9): ICD-10-CM

## 2024-05-14 DIAGNOSIS — E55.9 VITAMIN D DEFICIENCY: ICD-10-CM

## 2024-05-14 DIAGNOSIS — R80.1 PERSISTENT PROTEINURIA: ICD-10-CM

## 2024-05-14 PROCEDURE — 99417 PROLNG OP E/M EACH 15 MIN: CPT | Mod: 95,,, | Performed by: INTERNAL MEDICINE

## 2024-05-14 PROCEDURE — 99215 OFFICE O/P EST HI 40 MIN: CPT | Mod: 95,,, | Performed by: INTERNAL MEDICINE

## 2024-05-14 NOTE — PROGRESS NOTES
Subjective:      Patient ID: Deepak Tobias is a 48 y.o. Black or  male who presents for follow up evaluation of Chronic Kidney Disease    HPI    Nov 2023:  The patient location is: [place]  The chief complaint leading to consultation is: CKD  Visit type: audiovisual  65 minutes of total time spent on the encounter, which includes face to face time and non-face to face time preparing to see the patient (eg, review of tests), Obtaining and/or reviewing separately obtained history, Documenting clinical information in the electronic or other health record, Independently interpreting results (not separately reported) and communicating results to the patient/family/caregiver, or Care coordination (not separately reported).   Each patient to whom he or she provides medical services by telemedicine is:  (1) informed of the relationship between the physician and patient and the respective role of any other health care provider with respect to management of the patient; and (2) notified that he or she may decline to receive medical services by telemedicine and may withdraw from such care at any time.  Notes: He is referred by his PCP for CKD, slowly progressive in the setting of DM2, obesity and HTN. More recent creatinine levels are measured 1.5-1.8mg/dL. Blood sugars are very well controlled. 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)   303lbs down 220lbs before foot injury    Was off Norvasc due to gums per dentist but needed to resume as BP became uncontrolled   Was on 800mg ibuprofen BID for foot and shoulder pain since mid September  Kidney stones history--fresh lemonade   When he had COVID 4/2020  with NOEMY     March 2024:  The patient location is: [place]  The chief complaint leading to consultation is: CKD  Visit type: audiovisual  45 minutes of total time spent on the encounter, which includes face to face time and non-face to face time preparing to see the patient (eg, review of tests), Obtaining and/or reviewing separately obtained history, Documenting clinical information in the electronic or other health record, Independently interpreting results (not separately reported) and communicating results to the patient/family/caregiver, or Care coordination (not separately reported).   Each patient to whom he or she provides medical services by telemedicine is:  (1) informed of the relationship between the physician and patient and the respective role of any other health care provider with respect to management of the patient; and (2) notified that he or she may decline to receive medical services by telemedicine and may withdraw from such care at any time.  Notes: Here to review NOEMY CKD work up. Passed kidney stone in the past. Gained 22 lbs in a year due to foot injury--might be cleared for exercise late Spring. Decaf tea increase! No further NSAIDs. Mom sarcoid>>txp kidney     May 2024:  The patient location is: [place]  The chief complaint leading to consultation is: CKD  Visit type: audiovisual  70 minutes of total time spent on the encounter, which includes face to face time and non-face to face time preparing to see the patient (eg, review of tests), Obtaining and/or reviewing separately obtained history, Documenting clinical information in the electronic or other health record, Independently interpreting results (not separately reported) and communicating results to the patient/family/caregiver, or Care coordination (not separately reported).   Each patient to whom he or she provides medical services by telemedicine is:  (1) informed of the relationship between the physician and patient and the respective role  of any other health care provider with respect to management of the patient; and (2) notified that he or she may decline to receive medical services by telemedicine and may withdraw from such care at any time.  Notes: Foot is better. Jogging has started. Oveall doing well. Checks BS at home, not really checking BP. Staying hydrated         Review of Systems   Musculoskeletal:  Positive for arthralgias.   Allergic/Immunologic: Positive for immunocompromised state.      Objective:     Physical Exam  Constitutional:       General: He is not in acute distress.     Appearance: He is normal weight. He is not ill-appearing.   HENT:      Head: Atraumatic.   Eyes:      General: No scleral icterus.  Pulmonary:      Effort: Pulmonary effort is normal. No respiratory distress.   Neurological:      Mental Status: He is alert and oriented to person, place, and time.   Psychiatric:         Mood and Affect: Mood normal.       Assessment:     1. Stage 3a chronic kidney disease    2. Persistent proteinuria    3. Type 2 diabetes mellitus with hyperglycemia, without long-term current use of insulin    4. Primary hypertension    5. Obesity (BMI 30-39.9)    6. Microalbuminuria    7. Vitamin D deficiency           Plan:       NOEMY on CKD   - appears to be CKD but hopeful further improvement     CKD Stage 3a  - in the setting of obesity, HTN and hyperglycemia/DM (2020 A1c was 9.4)  - serologies WNL  - he exhibits microalbuminuria but only 80mg total proteinuria, already maintained on an ARB plus Aldactone  - he may benefit from SGLT2i>>discussed today at May's visit   - renal u/s reviewed>The patient has a history of of kidney stone cauing hydronephrosis>>low sodiuma nd hydration, fresh lemon, check renal u/s next year     Secondary HPT  - PTH at goal, but will add 1000u D3 as level only 33    HTN  - goal SBP <130 and 80 or less. Check BPs at home!   - now exercising again   - off HCTZ    DM2  - controlled      RTC 5mo

## 2024-05-21 ENCOUNTER — OFFICE VISIT (OUTPATIENT)
Dept: INTERNAL MEDICINE | Facility: CLINIC | Age: 49
End: 2024-05-21
Payer: COMMERCIAL

## 2024-05-21 VITALS
DIASTOLIC BLOOD PRESSURE: 88 MMHG | HEIGHT: 68 IN | WEIGHT: 250.88 LBS | BODY MASS INDEX: 38.02 KG/M2 | HEART RATE: 60 BPM | OXYGEN SATURATION: 98 % | SYSTOLIC BLOOD PRESSURE: 136 MMHG

## 2024-05-21 DIAGNOSIS — E11.65 TYPE 2 DIABETES MELLITUS WITH HYPERGLYCEMIA, WITHOUT LONG-TERM CURRENT USE OF INSULIN: ICD-10-CM

## 2024-05-21 DIAGNOSIS — N18.31 STAGE 3A CHRONIC KIDNEY DISEASE: Primary | ICD-10-CM

## 2024-05-21 DIAGNOSIS — R80.9 MICROALBUMINURIA: ICD-10-CM

## 2024-05-21 DIAGNOSIS — E78.5 HYPERLIPIDEMIA, UNSPECIFIED HYPERLIPIDEMIA TYPE: ICD-10-CM

## 2024-05-21 DIAGNOSIS — G47.30 SLEEP APNEA, UNSPECIFIED TYPE: ICD-10-CM

## 2024-05-21 DIAGNOSIS — I10 PRIMARY HYPERTENSION: ICD-10-CM

## 2024-05-21 PROCEDURE — 99999 PR PBB SHADOW E&M-EST. PATIENT-LVL IV: CPT | Mod: PBBFAC,,, | Performed by: INTERNAL MEDICINE

## 2024-05-21 PROCEDURE — 99214 OFFICE O/P EST MOD 30 MIN: CPT | Mod: S$GLB,,, | Performed by: INTERNAL MEDICINE

## 2024-05-21 RX ORDER — AMLODIPINE BESYLATE 10 MG/1
10 TABLET ORAL DAILY
Qty: 90 TABLET | Refills: 3 | Status: SHIPPED | OUTPATIENT
Start: 2024-05-21

## 2024-05-21 RX ORDER — VALSARTAN 320 MG/1
320 TABLET ORAL DAILY
Qty: 90 TABLET | Refills: 3 | Status: SHIPPED | OUTPATIENT
Start: 2024-05-21

## 2024-05-21 RX ORDER — ERGOCALCIFEROL 1.25 MG/1
50000 CAPSULE ORAL
COMMUNITY

## 2024-05-21 NOTE — PROGRESS NOTES
"I have personally discussed  this patient and agree with the resident, and agree with  their note as stated above with the following thoughts:    Started jogging- weight is getting better.   Up 7 # since l last visit-- has been having foot issue at home since August.      /88 (BP Location: Right arm, Patient Position: Sitting, BP Method: Large (Manual))   Pulse 60   Ht 5' 8" (1.727 m)   Wt 113.8 kg (250 lb 14.1 oz)   SpO2 98%   BMI 38.15 kg/m²       Follow up in 6 months--          "

## 2024-05-21 NOTE — PROGRESS NOTES
Subjective     Chief Complaint:    History of Present Illness:  Mr. Deepak Tobias is a 48 y.o. male who is a known case of HTN, DLP, SUPRIYA, CKD 3A, T2DM, obesity who presented for followup.  He has been struggling with his weight lately ever since his left foot injury. The patient has recently restarted exercising properly as his foot healed. He was worried about the mole on his upper left thigh after he developed an insect bite right next to it three weeks ago. Mole on examination appears small, with uniform edges and colors. He also was worried about two new moles on his upper back which also share the same characteristics as the mole on his upper left thigh. Patient has wondered when he would get a foot exam which was also done during this clinic visit and yielded normal findings.   His CKD is at baseline. He is compliant to his CPAP nightly. For his DM he is still on ozempic which is serving him well but not as good in suppressing his appetite anymore. He started exercising and is serious about losing weight.    Review of Systems   Constitutional:  Negative for chills, diaphoresis, fever, malaise/fatigue and weight loss.   HENT:  Negative for ear discharge, ear pain and tinnitus.    Eyes:  Negative for blurred vision, double vision and pain.   Respiratory:  Negative for cough, hemoptysis, sputum production and shortness of breath.    Cardiovascular:  Negative for chest pain.   Gastrointestinal:  Negative for constipation, diarrhea, heartburn, nausea and vomiting.   Genitourinary:  Negative for dysuria and urgency.   Musculoskeletal:  Negative for back pain, myalgias and neck pain.   Skin:  Negative for itching and rash.   Neurological:  Negative for dizziness, tingling and headaches.   Psychiatric/Behavioral:  Negative for depression, substance abuse and suicidal ideas.      PAST HISTORY:     Past Medical History:   Diagnosis Date    CKD (chronic kidney disease)     Diabetes mellitus     Hyperlipidemia      Hypertension     Obesity     Sleep apnea        Past Surgical History:   Procedure Laterality Date    achilies tendon         Family History   Problem Relation Name Age of Onset    Hypertension Father      Cancer Father      Blindness Mother      Diabetes Maternal Uncle      Acne Neg Hx      Melanoma Neg Hx      Amblyopia Neg Hx      Cataracts Neg Hx      Glaucoma Neg Hx      Macular degeneration Neg Hx      Retinal detachment Neg Hx      Strabismus Neg Hx      Stroke Neg Hx      Thyroid disease Neg Hx         Social History     Socioeconomic History    Marital status:    Occupational History     Employer: [employer]   Tobacco Use    Smoking status: Never    Smokeless tobacco: Never   Substance and Sexual Activity    Alcohol use: No    Drug use: No    Sexual activity: Yes     Partners: Female     Social Determinants of Health     Financial Resource Strain: Low Risk  (11/12/2023)    Overall Financial Resource Strain (CARDIA)     Difficulty of Paying Living Expenses: Not hard at all   Recent Concern: Financial Resource Strain - High Risk (8/28/2023)    Overall Financial Resource Strain (CARDIA)     Difficulty of Paying Living Expenses: Hard   Food Insecurity: No Food Insecurity (11/12/2023)    Hunger Vital Sign     Worried About Running Out of Food in the Last Year: Never true     Ran Out of Food in the Last Year: Never true   Transportation Needs: No Transportation Needs (11/12/2023)    PRAPARE - Transportation     Lack of Transportation (Medical): No     Lack of Transportation (Non-Medical): No   Physical Activity: Sufficiently Active (11/12/2023)    Exercise Vital Sign     Days of Exercise per Week: 5 days     Minutes of Exercise per Session: 60 min   Stress: No Stress Concern Present (11/12/2023)    Citizen of [place] [employer] - Occupational Stress Questionnaire     Feeling of Stress : Only a little   Housing Stability: Unknown (11/12/2023)    Housing Stability Vital Sign     Unable to Pay  for Housing in the Last Year: No     Unstable Housing in the Last Year: No   Recent Concern: Housing Stability - High Risk (8/28/2023)    Housing Stability Vital Sign     Unable to Pay for Housing in the Last Year: Yes     Unstable Housing in the Last Year: No       MEDICATIONS & ALLERGIES:     Current Outpatient Medications on File Prior to Visit   Medication Sig    amLODIPine (NORVASC) 10 MG tablet Take 1 tablet (10 mg total) by mouth once daily.    clindamycin-tretinoin (ZIANA) gel nightly.    clobetasoL (TEMOVATE) 0.05 % cream Apply to affected area on right hand twice daily during flares. Use for two weeks at a time. Do no apply to face, underarms, or groin.    ergocalciferol (VITAMIN D2) 50,000 unit Cap Take 50,000 Units by mouth every 7 days.    lancets Misc 1 lancet by Misc.(Non-Drug; Combo Route) route 3 (three) times daily with meals.    ONETOUCH DELICA PLUS LANCET 33 gauge Misc TEST 3 TIMES A DAY WITH MEALS    ONETOUCH VERIO TEST STRIPS Strp TEST 3 TIMES A DAY WITH MEALS    pravastatin (PRAVACHOL) 20 MG tablet Take 1 tablet (20 mg total) by mouth once daily.    semaglutide (OZEMPIC) 1 mg/dose (4 mg/3 mL) Inject 1 mg into the skin every 7 days.    spironolactone (ALDACTONE) 25 MG tablet TAKE 1 TABLET (25 MG TOTAL) BY MOUTH ONCE DAILY.    valsartan (DIOVAN) 320 MG tablet Take 1 tablet (320 mg total) by mouth once daily. For BP and kidneys    blood-glucose meter (ONETOUCH VERIO SYSTEM) Misc 1 Units by Misc.(Non-Drug; Combo Route) route once daily.    lancing device Misc 1 Device by Misc.(Non-Drug; Combo Route) route 2 (two) times daily with meals.    LIDOcaine-prilocaine (EMLA) cream Apply topically nightly. (Patient not taking: Reported on 5/14/2024)     No current facility-administered medications on file prior to visit.       Review of patient's allergies indicates:   Allergen Reactions    Keflex [cephalexin] Rash       OBJECTIVE:     Vital Signs:  Vitals:    05/21/24 1018   BP: 136/88   BP Location:  "Right arm   Patient Position: Sitting   BP Method: Large (Manual)   Pulse: 60   SpO2: 98%   Weight: 113.8 kg (250 lb 14.1 oz)   Height: 5' 8" (1.727 m)       Body mass index is 38.15 kg/m².     Physical Exam:  General:  Well developed, well nourished, no acute distress  Head: Normocephalic, atraumatic  Eyes: PERRL  Neck: supple, normal ROM, no thyromegaly   CVS:  RRR, S1 and S2 normal, no murmurs, rubs, gallops, 2+ peripheral pulses  Resp:  Lungs clear to auscultation, no wheezes, rales, rhonchi, cough  GI:  Abdomen soft, non-tender, non-distended, normoactive bowel sounds  MSK:  No muscle atrophy, cyanosis, peripheral edema   Skin:  No rashes, ulcers, erythema. 3 moles inspected: uniform color, size, and edges. Non-tender  Neuro:  CNII-XII grossly intact, no focal deficits noted  Psych:  Appropriate mood and affect, normal judgement    Laboratory  Lab Results   Component Value Date    WBC 6.45 12/11/2020    HGB 13.9 (L) 12/11/2020    HCT 44.7 12/11/2020    MCV 90 12/11/2020     12/11/2020     @NHIHOHXXA27(GLU,NA,K,Cl,CO2,BUN,Creatinine,Calcium,MG)@  No results found for: "INR", "PROTIME"  Lab Results   Component Value Date    HGBA1C 4.8 05/07/2024     No results for input(s): "POCTGLUCOSE" in the last 72 hours.    Diagnostic Results:  Labs: Reviewed    ASSESSMENT & PLAN:   Mr. Deepak Tobias is a 48 y.o. male who presents today for a followup    Deepak was seen today for follow-up.    Diagnoses and all orders for this visit:    Stage 3a chronic kidney disease  Most recent CRT 1.5 at baseline  Sleep apnea, unspecified type  Continues on CPAP    Type 2 diabetes mellitus with hyperglycemia, without long-term current use of insulin  On ozempic  HBA1C: 4.8%  Foot exam performed    Primary hypertension  On spironolactone, valsartan and amlodipine    Hyperlipidemia, unspecified hyperlipidemia type  On pravastatin, most recent TG is 204 and HDL 39     Microalbuminuria  Follows up with nephrologist    RTC " in 6 months    Case discussed with Dr Edis Trammell MD  Internal Medicine PGY1  [facility]  [address] [place] [id number] [phone]

## 2024-06-10 ENCOUNTER — PATIENT MESSAGE (OUTPATIENT)
Dept: INTERNAL MEDICINE | Facility: CLINIC | Age: 49
End: 2024-06-10
Payer: COMMERCIAL

## 2024-08-30 NOTE — TELEPHONE ENCOUNTER
Care Due:                  Date            Visit Type   Department     Provider  --------------------------------------------------------------------------------                                EP -                              PRIMARY      [facility] INTERNAL  Last Visit: 05-      CARE ([facility])   ZOFIA Alexander                              [facility]                              PRIMARY      [facility] INTERNAL  Next Visit: 11-      CARE ([facility])   ZOFIA Alexander                                                            Last  Test          Frequency    Reason                     Performed    Due Date  --------------------------------------------------------------------------------    HBA1C.......  6 months...  semaglutide..............  05- 11-    Lipid Panel.  12 months..  pravastatin..............  11- 11-    Health [facility] Embedded Care Due Messages. Reference number: [id number].   8/30/2024 4:06:01 PM CDT

## 2024-08-31 RX ORDER — PRAVASTATIN SODIUM 20 MG/1
20 TABLET ORAL DAILY
Qty: 90 TABLET | Refills: 0 | Status: SHIPPED | OUTPATIENT
Start: 2024-08-31

## 2024-08-31 NOTE — TELEPHONE ENCOUNTER
Provider Staff:  Action required for this patient    Requires labs      Please see care gap opportunities below in Care Due Message.    Thanks!  [facility]     Appointments      Date Provider   Last Visit   5/21/2024 Edis Alexander Jr., MD   Next Visit   11/19/2024 Edis Alexander Jr., MD     Refill Decision Note   Deepak Tobias  is requesting a refill authorization.  Brief Assessment and Rationale for Refill:  Approve     Medication Therapy Plan:         Comments:     Note composed:10:42 AM 08/31/2024

## 2024-09-24 RX ORDER — SPIRONOLACTONE 25 MG/1
25 TABLET ORAL
Qty: 90 TABLET | Refills: 2 | Status: SHIPPED | OUTPATIENT
Start: 2024-09-24

## 2024-09-24 NOTE — TELEPHONE ENCOUNTER
Refill Routing Note   Medication(s) are not appropriate for processing by [facility] for the following reason(s):        Required labs abnormal    [facility] action(s):  Defer             Pharmacist review requested: Yes     Appointments  past 12m or future 3m with PCP    Date Provider   Last Visit   5/21/2024 Edis Alexander Jr., MD   Next Visit   11/19/2024 Edis Alexander Jr., MD   ED visits in past 90 days: 0        Note composed:11:51 AM 09/24/2024

## 2024-09-24 NOTE — TELEPHONE ENCOUNTER
Refill Decision Note   Deepak Micheline  is requesting a refill authorization.  Brief Assessment and Rationale for Refill:  Approve     Medication Therapy Plan:  No dose adjustment recommended per renal fxn.       Pharmacist review requested: Yes   Extended chart review required: Yes   Comments:     Note composed:12:46 PM 09/24/2024

## 2024-09-24 NOTE — TELEPHONE ENCOUNTER
No care due was identified.  [facility] Embedded Care Due Messages. Reference number: [id number].   9/24/2024 12:41:45 AM CDT

## 2024-09-30 ENCOUNTER — LAB VISIT (OUTPATIENT)
Dept: LAB | Facility: HOSPITAL | Age: 49
End: 2024-09-30
Attending: INTERNAL MEDICINE
Payer: COMMERCIAL

## 2024-09-30 DIAGNOSIS — R80.1 PERSISTENT PROTEINURIA: ICD-10-CM

## 2024-09-30 DIAGNOSIS — N18.31 STAGE 3A CHRONIC KIDNEY DISEASE: ICD-10-CM

## 2024-09-30 LAB
CREAT UR-MCNC: 207 MG/DL (ref 23–375)
PROT UR-MCNC: 25 MG/DL (ref 0–15)
PROT/CREAT UR: 0.12 MG/G{CREAT} (ref 0–0.2)

## 2024-09-30 PROCEDURE — 82570 ASSAY OF URINE CREATININE: CPT | Performed by: INTERNAL MEDICINE

## 2024-10-14 ENCOUNTER — OFFICE VISIT (OUTPATIENT)
Dept: NEPHROLOGY | Facility: CLINIC | Age: 49
End: 2024-10-14
Payer: COMMERCIAL

## 2024-10-14 ENCOUNTER — TELEPHONE (OUTPATIENT)
Dept: OPTOMETRY | Facility: CLINIC | Age: 49
End: 2024-10-14
Payer: COMMERCIAL

## 2024-10-14 DIAGNOSIS — N18.31 STAGE 3A CHRONIC KIDNEY DISEASE: Primary | ICD-10-CM

## 2024-10-14 DIAGNOSIS — R80.9 MICROALBUMINURIA: ICD-10-CM

## 2024-10-14 DIAGNOSIS — E66.01 CLASS 2 SEVERE OBESITY WITH SERIOUS COMORBIDITY AND BODY MASS INDEX (BMI) OF 37.0 TO 37.9 IN ADULT, UNSPECIFIED OBESITY TYPE: ICD-10-CM

## 2024-10-14 DIAGNOSIS — E66.812 CLASS 2 SEVERE OBESITY WITH SERIOUS COMORBIDITY AND BODY MASS INDEX (BMI) OF 37.0 TO 37.9 IN ADULT, UNSPECIFIED OBESITY TYPE: ICD-10-CM

## 2024-10-14 DIAGNOSIS — N25.81 SECONDARY HYPERPARATHYROIDISM OF RENAL ORIGIN: ICD-10-CM

## 2024-10-14 DIAGNOSIS — N20.0 NEPHROLITHIASIS: ICD-10-CM

## 2024-10-14 DIAGNOSIS — E11.65 TYPE 2 DIABETES MELLITUS WITH HYPERGLYCEMIA, WITHOUT LONG-TERM CURRENT USE OF INSULIN: ICD-10-CM

## 2024-10-14 DIAGNOSIS — I10 PRIMARY HYPERTENSION: ICD-10-CM

## 2024-10-14 PROCEDURE — 99215 OFFICE O/P EST HI 40 MIN: CPT | Mod: 95,,, | Performed by: INTERNAL MEDICINE

## 2024-10-14 RX ORDER — ASPIRIN 325 MG
50 TABLET, DELAYED RELEASE (ENTERIC COATED) ORAL DAILY
COMMUNITY

## 2024-10-14 NOTE — PROGRESS NOTES
Subjective:      Patient ID: Deepak Tobias is a 49 y.o. Black or  male who presents for follow up evaluation of No chief complaint on file.    HPI    Nov 2023:  The patient location is: [place]  The chief complaint leading to consultation is: CKD  Visit type: audiovisual  65 minutes of total time spent on the encounter, which includes face to face time and non-face to face time preparing to see the patient (eg, review of tests), Obtaining and/or reviewing separately obtained history, Documenting clinical information in the electronic or other health record, Independently interpreting results (not separately reported) and communicating results to the patient/family/caregiver, or Care coordination (not separately reported).   Each patient to whom he or she provides medical services by telemedicine is:  (1) informed of the relationship between the physician and patient and the respective role of any other health care provider with respect to management of the patient; and (2) notified that he or she may decline to receive medical services by telemedicine and may withdraw from such care at any time.  Notes: He is referred by his PCP for CKD, slowly progressive in the setting of DM2, obesity and HTN. More recent creatinine levels are measured 1.5-1.8mg/dL. Blood sugars are very well controlled. 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)   303lbs down 220lbs before foot injury    Was off Norvasc due to gums per dentist but needed to resume as BP became uncontrolled   Was on 800mg ibuprofen BID for foot and shoulder pain since mid September  Kidney stones history--fresh lemonade   When he had COVID  4/2020 with NOEMY     March 2024:  The patient location is: [place]  The chief complaint leading to consultation is: CKD  Visit type: audiovisual  45 minutes of total time spent on the encounter, which includes face to face time and non-face to face time preparing to see the patient (eg, review of tests), Obtaining and/or reviewing separately obtained history, Documenting clinical information in the electronic or other health record, Independently interpreting results (not separately reported) and communicating results to the patient/family/caregiver, or Care coordination (not separately reported).   Each patient to whom he or she provides medical services by telemedicine is:  (1) informed of the relationship between the physician and patient and the respective role of any other health care provider with respect to management of the patient; and (2) notified that he or she may decline to receive medical services by telemedicine and may withdraw from such care at any time.  Notes: Here to review NOEMY CKD work up. Passed kidney stone in the past. Gained 22 lbs in a year due to foot injury--might be cleared for exercise late Spring. Decaf tea increase! No further NSAIDs. Mom sarcoid>>txp kidney     May 2024:  The patient location is: [place]  The chief complaint leading to consultation is: CKD  Visit type: audiovisual  70 minutes of total time spent on the encounter, which includes face to face time and non-face to face time preparing to see the patient (eg, review of tests), Obtaining and/or reviewing separately obtained history, Documenting clinical information in the electronic or other health record, Independently interpreting results (not separately reported) and communicating results to the patient/family/caregiver, or Care coordination (not separately reported).   Each patient to whom he or she provides medical services by telemedicine is:  (1) informed of the relationship between the physician and patient and the respective  role of any other health care provider with respect to management of the patient; and (2) notified that he or she may decline to receive medical services by telemedicine and may withdraw from such care at any time.  Notes: Foot is better. Jogging has started. Oveall doing well. Checks BS at home, not really checking BP. Staying hydrated     Oct 2024:  The patient location is: [place]  The chief complaint leading to consultation is: CKD  Visit type: audiovisual  55 minutes of total time spent on the encounter, which includes face to face time and non-face to face time preparing to see the patient (eg, review of tests), Obtaining and/or reviewing separately obtained history, Documenting clinical information in the electronic or other health record, Independently interpreting results (not separately reported) and communicating results to the patient/family/caregiver, or Care coordination (not separately reported).   Each patient to whom he or she provides medical services by telemedicine is:  (1) informed of the relationship between the physician and patient and the respective role of any other health care provider with respect to management of the patient; and (2) notified that he or she may decline to receive medical services by telemedicine and may withdraw from such care at any time.  Notes: Doing well. Paused work out due to muscle starin back. Staying active. Added coQ10. Has BPH symptoms         Review of Systems   Musculoskeletal:  Positive for arthralgias.   Allergic/Immunologic: Positive for immunocompromised state.      Objective:     Physical Exam  Constitutional:       General: He is not in acute distress.     Appearance: He is normal weight. He is not ill-appearing.   HENT:      Head: Atraumatic.   Eyes:      General: No scleral icterus.  Pulmonary:      Effort: Pulmonary effort is normal. No respiratory distress.   Neurological:      Mental Status: He is alert and oriented to person, place, and time.    Psychiatric:         Mood and Affect: Mood normal.     Assessment:     1. Stage 3a chronic kidney disease    2. Type 2 diabetes mellitus with hyperglycemia, without long-term current use of insulin    3. Primary hypertension    4. Microalbuminuria    5. Class 2 severe obesity with serious comorbidity and body mass index (BMI) of 37.0 to 37.9 in adult, unspecified obesity type    6. Secondary hyperparathyroidism of renal origin    7. Nephrolithiasis             Plan:       NOEMY on CKD   - appears to be CKD but hopeful further improvement     CKD Stage 3a  - in the setting of obesity, HTN and hyperglycemia/DM (2020 A1c was 9.4)  - cystatin C GFR indicates much better kidney function than sCr MDRD GFR (57 versus 81mL/min)  - serologies WNL  - he exhibits microalbuminuria but only 80mg total proteinuria, already maintained on an ARB plus Aldactone, now on Ozempic   - SGLT2i>>discussed today that since his GFR is better than 57, I do not feel there are strong indications for starting this class of medications at this time, as he is on ARB MRA and GLP1a     Nephrolithiasis   - renal u/s 5/2023 reviewed>The patient has a history of of kidney stone cauing hydronephrosis>>low sodiuma nd hydration, fresh lemon, check renal u/s next year for stone burden     Secondary HPT  - continue 1000u D3 as PTH improved to WNL    HTN  - goal SBP <130 and 80 or less.  - continue exercise   - off HCTZ  - low sodium diet     DM2  - well controlled    BPH symptoms  - check with PCP       RTC 6mo

## 2024-10-16 ENCOUNTER — PATIENT MESSAGE (OUTPATIENT)
Dept: NEPHROLOGY | Facility: CLINIC | Age: 49
End: 2024-10-16
Payer: COMMERCIAL

## 2024-10-16 ENCOUNTER — TELEPHONE (OUTPATIENT)
Dept: NEPHROLOGY | Facility: CLINIC | Age: 49
End: 2024-10-16
Payer: COMMERCIAL

## 2024-10-16 DIAGNOSIS — N18.31 STAGE 3A CHRONIC KIDNEY DISEASE: Primary | ICD-10-CM

## 2024-10-18 ENCOUNTER — OFFICE VISIT (OUTPATIENT)
Dept: OPTOMETRY | Facility: CLINIC | Age: 49
End: 2024-10-18
Payer: COMMERCIAL

## 2024-10-18 DIAGNOSIS — E11.9 TYPE 2 DIABETES MELLITUS WITHOUT RETINOPATHY: Primary | ICD-10-CM

## 2024-10-18 DIAGNOSIS — H52.4 BILATERAL PRESBYOPIA: ICD-10-CM

## 2024-10-18 PROCEDURE — 99999 PR PBB SHADOW E&M-EST. PATIENT-LVL II: CPT | Mod: PBBFAC,,, | Performed by: OPTOMETRIST

## 2024-10-18 NOTE — PROGRESS NOTES
HPI    ASHISH: 1/25/2023  Chief complaint (CC): Pt reports decrease vision since last visit. He only   got near MRX filled.   Glasses? reader  Contacts? no  H/o eye surgery, injections or laser: no  H/o eye injury: no  Known eye conditions? Dry eyes  Family h/o eye conditions? no  Eye gtts? no      (-) Flashes (-)  Floaters (-) Mucous   (-)  Tearing (-) Itching (-) Burning   (-) Headaches (-) Eye Pain/discomfort (-) Irritation   (-)  Redness (-) Double vision (+) Blurry vision- w/o readers    Diabetic? BSL was 105 this morning   A1c? Hemoglobin A1C       Date                     Value               Ref Range             Status                05/07/2024               4.8                 4.0 - 5.6 %           Final                    Last edited by Wilda Javed, OD on 10/18/2024 10:58 AM.            Assessment /Plan     For exam results, see Encounter Report.    Type 2 diabetes mellitus without retinopathy    Bilateral presbyopia      BS control. No signs of diabetic retinopathy. Monitor with annual exam.   Cont OTC readers. Normal ocular health. RTC 1 year.

## 2024-11-16 ENCOUNTER — PATIENT MESSAGE (OUTPATIENT)
Dept: INTERNAL MEDICINE | Facility: CLINIC | Age: 49
End: 2024-11-16
Payer: COMMERCIAL

## 2024-11-19 ENCOUNTER — LAB VISIT (OUTPATIENT)
Dept: LAB | Facility: HOSPITAL | Age: 49
End: 2024-11-19
Attending: INTERNAL MEDICINE
Payer: COMMERCIAL

## 2024-11-19 ENCOUNTER — OFFICE VISIT (OUTPATIENT)
Dept: INTERNAL MEDICINE | Facility: CLINIC | Age: 49
End: 2024-11-19
Payer: COMMERCIAL

## 2024-11-19 VITALS
SYSTOLIC BLOOD PRESSURE: 132 MMHG | WEIGHT: 255.31 LBS | HEART RATE: 64 BPM | DIASTOLIC BLOOD PRESSURE: 84 MMHG | BODY MASS INDEX: 38.69 KG/M2 | OXYGEN SATURATION: 98 % | HEIGHT: 68 IN

## 2024-11-19 DIAGNOSIS — E11.65 TYPE 2 DIABETES MELLITUS WITH HYPERGLYCEMIA, WITHOUT LONG-TERM CURRENT USE OF INSULIN: ICD-10-CM

## 2024-11-19 DIAGNOSIS — N18.31 STAGE 3A CHRONIC KIDNEY DISEASE: ICD-10-CM

## 2024-11-19 DIAGNOSIS — G47.30 SLEEP APNEA, UNSPECIFIED TYPE: ICD-10-CM

## 2024-11-19 DIAGNOSIS — R80.9 MICROALBUMINURIA: ICD-10-CM

## 2024-11-19 DIAGNOSIS — E66.9 OBESITY (BMI 30-39.9): ICD-10-CM

## 2024-11-19 DIAGNOSIS — Z12.5 SCREENING PSA (PROSTATE SPECIFIC ANTIGEN): ICD-10-CM

## 2024-11-19 DIAGNOSIS — I10 PRIMARY HYPERTENSION: Primary | ICD-10-CM

## 2024-11-19 DIAGNOSIS — E78.5 HYPERLIPIDEMIA, UNSPECIFIED HYPERLIPIDEMIA TYPE: ICD-10-CM

## 2024-11-19 LAB
ALBUMIN/CREAT UR: 43.9 UG/MG (ref 0–30)
COMPLEXED PSA SERPL-MCNC: 3.1 NG/ML (ref 0–4)
CREAT UR-MCNC: 410 MG/DL (ref 23–375)
MICROALBUMIN UR DL<=1MG/L-MCNC: 180 UG/ML

## 2024-11-19 PROCEDURE — 82043 UR ALBUMIN QUANTITATIVE: CPT | Performed by: INTERNAL MEDICINE

## 2024-11-19 PROCEDURE — 84153 ASSAY OF PSA TOTAL: CPT | Performed by: INTERNAL MEDICINE

## 2024-11-19 PROCEDURE — 99214 OFFICE O/P EST MOD 30 MIN: CPT | Mod: S$GLB,,, | Performed by: INTERNAL MEDICINE

## 2024-11-19 PROCEDURE — 36415 COLL VENOUS BLD VENIPUNCTURE: CPT | Performed by: INTERNAL MEDICINE

## 2024-11-19 PROCEDURE — 99999 PR PBB SHADOW E&M-EST. PATIENT-LVL III: CPT | Mod: PBBFAC,,, | Performed by: INTERNAL MEDICINE

## 2024-11-19 RX ORDER — PRAVASTATIN SODIUM 20 MG/1
20 TABLET ORAL DAILY
Qty: 90 TABLET | Refills: 3 | Status: SHIPPED | OUTPATIENT
Start: 2024-11-19

## 2024-11-19 RX ORDER — SEMAGLUTIDE 1.34 MG/ML
1 INJECTION, SOLUTION SUBCUTANEOUS
Qty: 3 EACH | Refills: 3 | Status: SHIPPED | OUTPATIENT
Start: 2024-11-19

## 2024-11-19 RX ORDER — AMLODIPINE BESYLATE 10 MG/1
10 TABLET ORAL DAILY
Qty: 90 TABLET | Refills: 3 | Status: SHIPPED | OUTPATIENT
Start: 2024-11-19

## 2024-11-19 RX ORDER — SPIRONOLACTONE 25 MG/1
25 TABLET ORAL DAILY
Qty: 90 TABLET | Refills: 3 | Status: SHIPPED | OUTPATIENT
Start: 2024-11-19

## 2024-11-19 RX ORDER — VALSARTAN 320 MG/1
320 TABLET ORAL DAILY
Qty: 90 TABLET | Refills: 3 | Status: SHIPPED | OUTPATIENT
Start: 2024-11-19

## 2024-11-19 NOTE — PROGRESS NOTES
Mr. Deepak Tobias is a 49   y.o. male here for his annual exam.        He has been having scitica and had some hand strings injuries álvaro last visit.          Hyperlipidemia:  He is taking pravastatin.  He has had hyperlipidemia for several years.  Most recent labs (11/2023) showed good control.  TG was 204     Hypertension: Present for multiple years.  He is on Valsartan/HCTZ 320-25mg and amlodipine 10mg. Spironolactone 25mg.  Blood pressure today is 132/84, well controlled.       Obesity:  He is weighing 243 today.  BMI is 38.82 . He has gained 12 # since last visit.    He is regaining weight he lost when he had COVID due to inactivity secondary to knee pain/ foot pain.  Patient states his weight has been around 255 lbs , but has had a lot of salt over the weekend.    - Ozempic is no longer controlling cravings  - Patient has been experiencing slower bowel movements on ozempic, has a bowel movement every 2-3 days instead of daily.  - does not drink alcohol     DM: Last hemoglobin A1c was 4.8   .  He is taking ozempic 1 mg a week.            Recent creatinine was 1.5  (9/24 ) -- normal range 1.4-1.6   _ avoiding NSAIDS.       Sleep apnea: He uses CPAP without any difficulty.  Using it well- not problem.  Sleep has been good.         Mood- has not been working, has been staying at home with kids, increased stress, denies anxiety, feels more irritable, decreased patience, noticed an increase in the last couple months      - denies hx of anxiety or depression      - has had panic attacks around 5 and 9 years ago     ROS : Gen - no fatigue-- weight loss as above .  Eyes - no eye pain or visual changes  ENT - no hoarseness or sore throat  CV - No chest pain or SOB.   Positive for  palpitations.  Pulm - no cough or wheezing  GI - no N/V/D   no dysuria or incontinence, positive for change in urination  MS - no joint pain or muscle pain  Skin - no rash, or c/o of skin lesions  Neuro - no HA, dizziness--- memory is  "doing well.   Heme - no abnormal bleeding or bruising  Endo - no polydipsia, or temperature changes  Psych - no anxiety or depression, feels irritable and decreased patience           Patient Active Problem List   Diagnosis    Hyperlipidemia    Hypertension    Obesity    Sleep apnea    Rash    Type 2 diabetes mellitus with hyperglycemia, without long-term current use of insulin    Gingiva disorder            OBJECTIVE:           /84 (BP Location: Right arm, Patient Position: Sitting)   Pulse 64   Ht 5' 8" (1.727 m)   Wt 115.8 kg (255 lb 4.7 oz)   SpO2 98%   BMI 38.82 kg/m²           Physical Exam  he is a morbidly obese 49 -- year-old gentleman.  His mood is good.    Constitutional: He is oriented to person, place, and time and well-developed, well-nourished, and in no distress.   HENT:   Head: Normocephalic and atraumatic.   Eyes: EOM are normal.   Neck: Normal range of motion.   Cardiovascular: Normal rate, regular rhythm, normal heart sounds and intact distal pulses.   Pulmonary/Chest: Effort normal and breath sounds normal. He has no wheezes. He has no rales.   Abdominal: Soft. Bowel sounds are normal. There is no abdominal tenderness.   Musculoskeletal:         General: No edema.      Comments:  Today he is not having any complaints of any joints.  He does have crepitus in both knees though.  Neurological: He is alert and oriented to person, place, and time.   Skin: Skin is warm and dry.   Psychiatric: Mood normal.     Protective Sensation (w/ 10 gram monofilament):  Right: Intact  Left: Intact    Visual Inspection:  Normal -  Bilateral    Pedal Pulses:   Right: Present  Left: Present    Posterior Tibialis Pulses:   Right:Present  Left: Present                 ASSESSMENT & PLAN:   Hypertension-- doing well.   -           BP today-   132 /82  -           Continue Diovan 320-25mg and amlopidipine 10mg   -            Spironolactone 25mg-      Hyperlipidemia       -      Continue pravastatin-- check " lipid panel next time      Type 2 diabetes mellitus with hyperglycemia, without long-term current use of insulin              -           Most recent A1c 5/23- 4.7        -     off lantus and metformin and   on Ozempic. Well controlled      Obesity--   -           discussed diet and exercise as tolerated.    -                 NOEMY-- ckd  -           Creatinine (9/2024) is 1.5   Stay off IBP--       Labs today      RTC 6 months         Labs today = steven review-- not interested in

## 2024-12-17 ENCOUNTER — PATIENT MESSAGE (OUTPATIENT)
Dept: INTERNAL MEDICINE | Facility: CLINIC | Age: 49
End: 2024-12-17
Payer: COMMERCIAL

## 2024-12-18 RX ORDER — BLOOD-GLUCOSE SENSOR
EACH MISCELLANEOUS
Qty: 9 EACH | Refills: 2 | Status: SHIPPED | OUTPATIENT
Start: 2024-12-18 | End: 2024-12-19 | Stop reason: SDUPTHER

## 2024-12-19 NOTE — TELEPHONE ENCOUNTER
Care Due:                  Date            Visit Type   Department     Provider  --------------------------------------------------------------------------------                                EP -                              PRIMARY      [facility] INTERNAL  Last Visit: 11-      CARE ([facility])   ZOFIA Alexander                              [facility]                              PRIMARY      [facility] INTERNAL  Next Visit: 05-      CARE ([facility])   ZOFIA Alexander                                                            Last  Test          Frequency    Reason                     Performed    Due Date  --------------------------------------------------------------------------------    HBA1C.......  6 months...  semaglutide..............  05- 11-    Lipid Panel.  12 months..  pravastatin..............  11- 11-    [facility] Embedded Care Due Messages. Reference number: [id number].   12/19/2024 9:06:45 AM CST

## 2024-12-20 RX ORDER — BLOOD-GLUCOSE SENSOR
EACH MISCELLANEOUS
Qty: 9 EACH | Refills: 2 | Status: SHIPPED | OUTPATIENT
Start: 2024-12-20

## 2025-01-29 DIAGNOSIS — E11.9 TYPE 2 DIABETES MELLITUS WITHOUT COMPLICATION: ICD-10-CM

## 2025-02-02 DIAGNOSIS — E11.65 TYPE 2 DIABETES MELLITUS WITH HYPERGLYCEMIA, WITHOUT LONG-TERM CURRENT USE OF INSULIN: ICD-10-CM

## 2025-02-03 RX ORDER — SEMAGLUTIDE 1.34 MG/ML
1 INJECTION, SOLUTION SUBCUTANEOUS
Qty: 3 EACH | Refills: 4 | Status: SHIPPED | OUTPATIENT
Start: 2025-02-03

## 2025-02-03 NOTE — TELEPHONE ENCOUNTER
Refill Routing Note   Medication(s) are not appropriate for processing by [facility] for the following reason(s):        Required labs outdated    [facility] action(s):  Defer     Requires labs : Yes             Appointments  past 12m or future 3m with PCP    Date Provider   Last Visit   11/19/2024 Edis Alexander Jr., MD   Next Visit   5/20/2025 Edis Alexander Jr., MD   ED visits in past 90 days: 0        Note composed:3:23 AM 02/03/2025

## 2025-02-05 ENCOUNTER — PATIENT MESSAGE (OUTPATIENT)
Dept: ADMINISTRATIVE | Facility: HOSPITAL | Age: 50
End: 2025-02-05
Payer: COMMERCIAL

## 2025-02-11 ENCOUNTER — PATIENT MESSAGE (OUTPATIENT)
Dept: INTERNAL MEDICINE | Facility: CLINIC | Age: 50
End: 2025-02-11
Payer: COMMERCIAL

## 2025-02-11 DIAGNOSIS — E11.65 TYPE 2 DIABETES MELLITUS WITH HYPERGLYCEMIA, WITHOUT LONG-TERM CURRENT USE OF INSULIN: Primary | ICD-10-CM

## 2025-02-14 RX ORDER — BLOOD-GLUCOSE,RECEIVER,CONT
EACH MISCELLANEOUS
Qty: 1 EACH | Refills: 0 | Status: SHIPPED | OUTPATIENT
Start: 2025-02-14

## 2025-02-20 NOTE — TELEPHONE ENCOUNTER
Care Due:                  Date            Visit Type   Department     Provider  --------------------------------------------------------------------------------                                EP -                              PRIMARY      [facility] INTERNAL  Last Visit: 11-      CARE ([facility])   ZOFIA Alexander                              [facility]                              PRIMARY      [facility] INTERNAL  Next Visit: 05-      CARE ([facility])   ZOFIA Alexander                                                            Last  Test          Frequency    Reason                     Performed    Due Date  --------------------------------------------------------------------------------    HBA1C.......  6 months...  semaglutide..............  05- 11-    Lipid Panel.  12 months..  pravastatin..............  11- 11-    [facility] Embedded Care Due Messages. Reference number: [id number].   2/19/2025 7:33:57 PM CST

## 2025-02-20 NOTE — TELEPHONE ENCOUNTER
Refill Routing Note   Medication(s) are not appropriate for processing by [facility] for the following reason(s):        Required labs outdated    [facility] action(s):  Defer     Requires labs : Yes             Appointments  past 12m or future 3m with PCP    Date Provider   Last Visit   11/19/2024 Edis Alexander Jr., MD   Next Visit   5/20/2025 Edis Alexander Jr., MD   ED visits in past 90 days: 0        Note composed:7:51 PM 02/19/2025

## 2025-02-21 RX ORDER — PRAVASTATIN SODIUM 20 MG/1
20 TABLET ORAL DAILY
Qty: 90 TABLET | Refills: 3 | Status: SHIPPED | OUTPATIENT
Start: 2025-02-21

## 2025-03-31 ENCOUNTER — PATIENT MESSAGE (OUTPATIENT)
Dept: INTERNAL MEDICINE | Facility: CLINIC | Age: 50
End: 2025-03-31
Payer: COMMERCIAL

## 2025-04-03 ENCOUNTER — PATIENT MESSAGE (OUTPATIENT)
Dept: NEPHROLOGY | Facility: CLINIC | Age: 50
End: 2025-04-03
Payer: COMMERCIAL

## 2025-04-09 ENCOUNTER — HOSPITAL ENCOUNTER (OUTPATIENT)
Dept: RADIOLOGY | Facility: HOSPITAL | Age: 50
Discharge: HOME | End: 2025-04-09
Attending: INTERNAL MEDICINE
Payer: COMMERCIAL

## 2025-04-09 DIAGNOSIS — N18.31 STAGE 3A CHRONIC KIDNEY DISEASE: ICD-10-CM

## 2025-04-09 PROCEDURE — 76770 US EXAM ABDO BACK WALL COMP: CPT | Mod: TC

## 2025-04-09 PROCEDURE — 76770 US EXAM ABDO BACK WALL COMP: CPT | Mod: 26,,, | Performed by: INTERNAL MEDICINE

## 2025-04-14 ENCOUNTER — PATIENT MESSAGE (OUTPATIENT)
Dept: NEPHROLOGY | Facility: CLINIC | Age: 50
End: 2025-04-14

## 2025-04-14 ENCOUNTER — OFFICE VISIT (OUTPATIENT)
Dept: NEPHROLOGY | Facility: CLINIC | Age: 50
End: 2025-04-14
Payer: COMMERCIAL

## 2025-04-14 DIAGNOSIS — E11.65 TYPE 2 DIABETES MELLITUS WITH HYPERGLYCEMIA, WITHOUT LONG-TERM CURRENT USE OF INSULIN: ICD-10-CM

## 2025-04-14 DIAGNOSIS — E55.9 VITAMIN D DEFICIENCY: ICD-10-CM

## 2025-04-14 DIAGNOSIS — R80.9 MICROALBUMINURIA: ICD-10-CM

## 2025-04-14 DIAGNOSIS — N25.81 SECONDARY HYPERPARATHYROIDISM OF RENAL ORIGIN: ICD-10-CM

## 2025-04-14 DIAGNOSIS — N18.31 STAGE 3A CHRONIC KIDNEY DISEASE: Primary | ICD-10-CM

## 2025-04-14 DIAGNOSIS — I10 PRIMARY HYPERTENSION: ICD-10-CM

## 2025-04-14 PROCEDURE — 98007 SYNCH AUDIO-VIDEO EST HI 40: CPT | Mod: 95,,, | Performed by: INTERNAL MEDICINE

## 2025-04-14 RX ORDER — VIT C/E/ZN/COPPR/LUTEIN/ZEAXAN 250MG-90MG
1000 CAPSULE ORAL DAILY
COMMUNITY
Start: 2025-04-14

## 2025-04-14 NOTE — PROGRESS NOTES
Subjective:      Patient ID: Deepak Tobias is a 49 y.o. Black or  male who presents for follow up evaluation of Chronic Kidney Disease    HPI    Nov 2023:  The patient location is: [place]  The chief complaint leading to consultation is: CKD  Visit type: audiovisual  65 minutes of total time spent on the encounter, which includes face to face time and non-face to face time preparing to see the patient (eg, review of tests), Obtaining and/or reviewing separately obtained history, Documenting clinical information in the electronic or other health record, Independently interpreting results (not separately reported) and communicating results to the patient/family/caregiver, or Care coordination (not separately reported).   Each patient to whom he or she provides medical services by telemedicine is:  (1) informed of the relationship between the physician and patient and the respective role of any other health care provider with respect to management of the patient; and (2) notified that he or she may decline to receive medical services by telemedicine and may withdraw from such care at any time.  Notes: He is referred by his PCP for CKD, slowly progressive in the setting of DM2, obesity and HTN. More recent creatinine levels are measured 1.5-1.8mg/dL. Blood sugars are very well controlled. 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)   303lbs down 220lbs before foot injury    Was off Norvasc due to gums per dentist but needed to resume as BP became uncontrolled   Was on 800mg ibuprofen BID for foot and shoulder pain since mid September  Kidney stones history--fresh lemonade   When he had COVID 4/2020  with NOEMY     March 2024:  The patient location is: [place]  The chief complaint leading to consultation is: CKD  Visit type: audiovisual  45 minutes of total time spent on the encounter, which includes face to face time and non-face to face time preparing to see the patient (eg, review of tests), Obtaining and/or reviewing separately obtained history, Documenting clinical information in the electronic or other health record, Independently interpreting results (not separately reported) and communicating results to the patient/family/caregiver, or Care coordination (not separately reported).   Each patient to whom he or she provides medical services by telemedicine is:  (1) informed of the relationship between the physician and patient and the respective role of any other health care provider with respect to management of the patient; and (2) notified that he or she may decline to receive medical services by telemedicine and may withdraw from such care at any time.  Notes: Here to review NOEMY CKD work up. Passed kidney stone in the past. Gained 22 lbs in a year due to foot injury--might be cleared for exercise late Spring. Decaf tea increase! No further NSAIDs. Mom sarcoid>>txp kidney     May 2024:  The patient location is: [place]  The chief complaint leading to consultation is: CKD  Visit type: audiovisual  70 minutes of total time spent on the encounter, which includes face to face time and non-face to face time preparing to see the patient (eg, review of tests), Obtaining and/or reviewing separately obtained history, Documenting clinical information in the electronic or other health record, Independently interpreting results (not separately reported) and communicating results to the patient/family/caregiver, or Care coordination (not separately reported).   Each patient to whom he or she provides medical services by telemedicine is:  (1) informed of the relationship between the physician and patient and the respective role  of any other health care provider with respect to management of the patient; and (2) notified that he or she may decline to receive medical services by telemedicine and may withdraw from such care at any time.  Notes: Foot is better. Jogging has started. Oveall doing well. Checks BS at home, not really checking BP. Staying hydrated     Oct 2024:  The patient location is: [place]  The chief complaint leading to consultation is: CKD  Visit type: audiovisual  55 minutes of total time spent on the encounter, which includes face to face time and non-face to face time preparing to see the patient (eg, review of tests), Obtaining and/or reviewing separately obtained history, Documenting clinical information in the electronic or other health record, Independently interpreting results (not separately reported) and communicating results to the patient/family/caregiver, or Care coordination (not separately reported).   Each patient to whom he or she provides medical services by telemedicine is:  (1) informed of the relationship between the physician and patient and the respective role of any other health care provider with respect to management of the patient; and (2) notified that he or she may decline to receive medical services by telemedicine and may withdraw from such care at any time.  Notes: Doing well. Paused work out due to muscle starin back. Staying active. Added coQ10. Has BPH symptoms     April 2025:  The patient location is: [place]  The chief complaint leading to consultation is: CKD  Visit type: audiovisual  50 minutes of total time spent on the encounter, which includes face to face time and non-face to face time preparing to see the patient (eg, review of tests), Obtaining and/or reviewing separately obtained history, Documenting clinical information in the electronic or other health record, Independently interpreting results (not separately reported) and communicating results to the patient/family/caregiver, or Care  coordination (not separately reported).   Each patient to whom he or she provides medical services by telemedicine is:  (1) informed of the relationship between the physician and patient and the respective role of any other health care provider with respect to management of the patient; and (2) notified that he or she may decline to receive medical services by telemedicine and may withdraw from such care at any time.  Notes: Aches and pains. No LE edema. Notes occasional cloudy urine, and medicinal odor, worse when urine is concentrated.       Review of Systems   Musculoskeletal:  Positive for arthralgias.   Allergic/Immunologic: Positive for immunocompromised state.      Objective:     Physical Exam  Constitutional:       General: He is not in acute distress.     Appearance: He is normal weight. He is not ill-appearing.   HENT:      Head: Atraumatic.   Eyes:      General: No scleral icterus.  Pulmonary:      Effort: Pulmonary effort is normal. No respiratory distress.   Neurological:      Mental Status: He is alert and oriented to person, place, and time.   Psychiatric:         Mood and Affect: Mood normal.     Assessment:     1. Stage 3a chronic kidney disease    2. Type 2 diabetes mellitus with hyperglycemia, without long-term current use of insulin    3. Primary hypertension    4. Microalbuminuria    5. Secondary hyperparathyroidism of renal origin    6. Vitamin D deficiency         Plan:       CKD Stage 3a  - in the setting of obesity, HTN and hyperglycemia/DM (2020 A1c was 9.4)  - cystatin C GFR indicates much better kidney function than sCr MDRD GFR (57 versus 81mL/min)  - serologies WNL  - he exhibits microalbuminuria but only 80mg total proteinuria, already maintained on an ARB plus Aldactone, now on Ozempic     Nephrolithiasis   - renal u/s 5/2023 reviewed>The patient has a history of of kidney stone cauing hydronephrosis>>low sodiuma nd hydration, fresh lemon, renal u/s with out kidney stones      Secondary HPT  - continue 1000u D3 as PTH improved to WNL X 2    HTN  - goal SBP <130 and 80 or less.  - continue exercise   - off HCTZ  - low sodium diet     DM2  - well controlled    BPH symptoms/LUTS  - check with PCP   - if cloudy urine again pt to notify us to check urine       RTC 7mo

## 2025-05-28 ENCOUNTER — LAB VISIT (OUTPATIENT)
Dept: LAB | Facility: HOSPITAL | Age: 50
End: 2025-05-28
Attending: INTERNAL MEDICINE
Payer: COMMERCIAL

## 2025-05-28 DIAGNOSIS — E11.9 TYPE 2 DIABETES MELLITUS WITHOUT COMPLICATION: ICD-10-CM

## 2025-05-28 LAB
CHOLEST SERPL-MCNC: 162 MG/DL (ref 120–199)
CHOLEST/HDLC SERPL: 3.8 {RATIO} (ref 2–5)
EAG (OHS): 97 MG/DL (ref 68–131)
HBA1C MFR BLD: 5 % (ref 4–5.6)
HDLC SERPL-MCNC: 43 MG/DL (ref 40–75)
HDLC SERPL: 26.5 % (ref 20–50)
LDLC SERPL CALC-MCNC: 100.4 MG/DL (ref 63–159)
NONHDLC SERPL-MCNC: 119 MG/DL
TRIGL SERPL-MCNC: 93 MG/DL (ref 30–150)

## 2025-05-28 PROCEDURE — 80061 LIPID PANEL: CPT

## 2025-05-28 PROCEDURE — 36415 COLL VENOUS BLD VENIPUNCTURE: CPT | Mod: PN

## 2025-05-28 PROCEDURE — 83036 HEMOGLOBIN GLYCOSYLATED A1C: CPT

## 2025-06-12 ENCOUNTER — OFFICE VISIT (OUTPATIENT)
Dept: INTERNAL MEDICINE | Facility: CLINIC | Age: 50
End: 2025-06-12
Payer: COMMERCIAL

## 2025-06-12 VITALS
BODY MASS INDEX: 38.49 KG/M2 | HEIGHT: 68 IN | OXYGEN SATURATION: 98 % | DIASTOLIC BLOOD PRESSURE: 86 MMHG | SYSTOLIC BLOOD PRESSURE: 128 MMHG | HEART RATE: 64 BPM | WEIGHT: 254 LBS

## 2025-06-12 DIAGNOSIS — E78.5 HYPERLIPIDEMIA, UNSPECIFIED HYPERLIPIDEMIA TYPE: Primary | ICD-10-CM

## 2025-06-12 DIAGNOSIS — I10 PRIMARY HYPERTENSION: ICD-10-CM

## 2025-06-12 DIAGNOSIS — Z12.5 SCREENING PSA (PROSTATE SPECIFIC ANTIGEN): ICD-10-CM

## 2025-06-12 DIAGNOSIS — E66.812 CLASS 2 SEVERE OBESITY WITH SERIOUS COMORBIDITY AND BODY MASS INDEX (BMI) OF 38.0 TO 38.9 IN ADULT, UNSPECIFIED OBESITY TYPE: ICD-10-CM

## 2025-06-12 DIAGNOSIS — E11.65 TYPE 2 DIABETES MELLITUS WITH HYPERGLYCEMIA, WITHOUT LONG-TERM CURRENT USE OF INSULIN: ICD-10-CM

## 2025-06-12 DIAGNOSIS — N18.31 STAGE 3A CHRONIC KIDNEY DISEASE: ICD-10-CM

## 2025-06-12 DIAGNOSIS — E66.01 CLASS 2 SEVERE OBESITY WITH SERIOUS COMORBIDITY AND BODY MASS INDEX (BMI) OF 38.0 TO 38.9 IN ADULT, UNSPECIFIED OBESITY TYPE: ICD-10-CM

## 2025-06-12 DIAGNOSIS — G47.30 SLEEP APNEA, UNSPECIFIED TYPE: ICD-10-CM

## 2025-06-12 PROCEDURE — 99214 OFFICE O/P EST MOD 30 MIN: CPT | Mod: S$GLB,,, | Performed by: INTERNAL MEDICINE

## 2025-06-12 PROCEDURE — 99999 PR PBB SHADOW E&M-EST. PATIENT-LVL III: CPT | Mod: PBBFAC,,, | Performed by: INTERNAL MEDICINE

## 2025-06-12 RX ORDER — BLOOD-GLUCOSE SENSOR
EACH MISCELLANEOUS
Qty: 9 EACH | Refills: 2 | Status: SHIPPED | OUTPATIENT
Start: 2025-06-12

## 2025-06-12 RX ORDER — VALSARTAN 320 MG/1
320 TABLET ORAL DAILY
Qty: 90 TABLET | Refills: 3 | Status: SHIPPED | OUTPATIENT
Start: 2025-06-12

## 2025-06-12 RX ORDER — PRAVASTATIN SODIUM 20 MG/1
20 TABLET ORAL DAILY
Qty: 90 TABLET | Refills: 3 | Status: SHIPPED | OUTPATIENT
Start: 2025-06-12

## 2025-06-12 RX ORDER — AMLODIPINE BESYLATE 10 MG/1
10 TABLET ORAL DAILY
Qty: 90 TABLET | Refills: 3 | Status: SHIPPED | OUTPATIENT
Start: 2025-06-12

## 2025-06-12 RX ORDER — SPIRONOLACTONE 25 MG/1
25 TABLET ORAL DAILY
Qty: 90 TABLET | Refills: 3 | Status: SHIPPED | OUTPATIENT
Start: 2025-06-12

## 2025-06-12 RX ORDER — SEMAGLUTIDE 2.68 MG/ML
2 INJECTION, SOLUTION SUBCUTANEOUS
Qty: 3 ML | Refills: 11 | Status: SHIPPED | OUTPATIENT
Start: 2025-06-12

## 2025-06-12 NOTE — PROGRESS NOTES
Mr. Deepak Tobias is a 49   y.o. male here for his  follow up of medical problems.     He has been having scitica and had some hand strings injuries álvaro last visit.          Hyperlipidemia:  He is taking pravastatin.  He has had hyperlipidemia for several years.  Most recent labs (5/2025) showed good control.  TG was 93, .       Hypertension: Present for multiple years.  He is on Valsartan/HCTZ 320-25mg and amlodipine 10mg. Spironolactone 25mg.  Blood pressure today is 132/84, well controlled.       Obesity:  He is weighing 253 today.  BMI is 38.62  .     He is regaining weight he lost when he had COVID due to inactivity secondary to knee pain/ foot pain.  Patient states his weight has been around 255 lbs ,    - Ozempic is no longer controlling cravings  - Patient has been experiencing slower bowel movements on ozempic, has a bowel movement every 2-3 days instead of daily.  - does not drink alcohol     DM: Last hemoglobin A1c was 5.0     .  He is taking ozempic 1 mg a week.           Ckd stage 3 A-- Recent creatinine was 1.5  (9/24 ) -- normal range 1.4-1.6   _ avoiding NSAIDS.  Saw nephorlogy last in April 2025--      Sleep apnea: He uses CPAP without any difficulty.  Using it well- not problem.  Sleep has been good.         Mood- has not been working, has been staying at home with kids, increased stress,Lost father to heart attack in May of this year--  denies anxiety, feels more irritable, decreased patience, noticed an increase in the last couple months      - denies hx of anxiety or depression      - has had panic attacks around 5 and 9 years ago     ROS : Gen - no fatigue-- weight loss as above .  Eyes - no eye pain or visual changes  ENT - no hoarseness or sore throat  CV - No chest pain or SOB.   Positive for  palpitations.  Pulm - no cough or wheezing  GI - no N/V/D   no dysuria or incontinence, positive for change in urination  MS - no joint pain or muscle pain  Skin - no rash, or c/o of  "skin lesions  Neuro - no HA, dizziness--- memory is doing well.   Heme - no abnormal bleeding or bruising  Endo - no polydipsia, or temperature changes  Psych - no anxiety or depression, feels irritable and decreased patience           Patient Active Problem List   Diagnosis    Hyperlipidemia    Hypertension    Obesity    Sleep apnea    Rash    Type 2 diabetes mellitus with hyperglycemia, without long-term current use of insulin    Gingiva disorder            OBJECTIVE:                 /86 (BP Location: Left arm, Patient Position: Sitting)   Pulse 64   Ht 5' 8" (1.727 m)   Wt 115.2 kg (253 lb 15.5 oz)   SpO2 98%   BMI 38.62 kg/m²              Physical Exam  he is a morbidly obese 49 -- year-old gentleman.  His mood is good.    Constitutional: He is oriented to person, place, and time and well-developed, well-nourished, and in no distress.   HENT:   Head: Normocephalic and atraumatic.   Eyes: EOM are normal.   Neck: Normal range of motion.   Cardiovascular: Normal rate, regular rhythm, normal heart sounds and intact distal pulses.   Pulmonary/Chest: Effort normal and breath sounds normal. He has no wheezes. He has no rales.   Abdominal: Soft. Bowel sounds are normal. There is no abdominal tenderness.   Musculoskeletal:         General: No edema.      Comments:  Today he is not having any complaints of any joints.  He does have crepitus in both knees though.  Neurological: He is alert and oriented to person, place, and time.   Skin: Skin is warm and dry.   Psychiatric: Mood normal.                     ASSESSMENT & PLAN:   Hypertension-- doing well.   -           BP today-   128/86.    -           Continue Diovan 320-25mg and amlopidipine 10mg   -            Spironolactone 25mg-      Hyperlipidemia       -      Continue pravastatin-- check lipid panel next time      Type 2 diabetes mellitus with hyperglycemia, without long-term current use of insulin              -           Most recent A1c 5/23- 4.7      "   -     off lantus and metformin and   on Ozempic. WIll increase ozempic to 2 mg a week.  It has gone up since last time.       Obesity--   -           discussed diet and exercise as tolerated.    -                 NOEMY-- ckd  -           Creatinine  is 1.5   Stay off IBP--     Hyperlipidemia, unspecified hyperlipidemia type  -     Lipid Panel; Future; Expected date: 12/12/2025    Primary hypertension    Class 2 severe obesity with serious comorbidity and body mass index (BMI) of 38.0 to 38.9 in adult, unspecified obesity type    Stage 3a chronic kidney disease    Type 2 diabetes mellitus with hyperglycemia, without long-term current use of insulin  -     Comprehensive Metabolic Panel; Future; Expected date: 12/12/2025  -     Hemoglobin A1C; Future; Expected date: 12/12/2025  -     semaglutide (OZEMPIC) 2 mg/dose (8 mg/3 mL) PnIj; Inject 2 mg into the skin every 7 days.  Dispense: 3 mL; Refill: 11  -     amLODIPine (NORVASC) 10 MG tablet; Take 1 tablet (10 mg total) by mouth once daily.  Dispense: 90 tablet; Refill: 3    Sleep apnea, unspecified type    Screening PSA (prostate specific antigen)  -     PSA, Screening; Future; Expected date: 12/12/2025    Other orders  -     valsartan (DIOVAN) 320 MG tablet; Take 1 tablet (320 mg total) by mouth once daily. For BP and kidneys  Dispense: 90 tablet; Refill: 3  -     spironolactone (ALDACTONE) 25 MG tablet; Take 1 tablet (25 mg total) by mouth once daily.  Dispense: 90 tablet; Refill: 3  -     pravastatin (PRAVACHOL) 20 MG tablet; Take 1 tablet (20 mg total) by mouth once daily.  Dispense: 90 tablet; Refill: 3  -     blood-glucose sensor (DEXCOM G7 SENSOR) Florina; Apply to skin for 10 days  Dispense: 9 each; Refill: 2          RTC 6 months        Labs before next visit.

## 2025-08-19 ENCOUNTER — PATIENT MESSAGE (OUTPATIENT)
Dept: INTERNAL MEDICINE | Facility: CLINIC | Age: 50
End: 2025-08-19
Payer: COMMERCIAL

## 2025-08-24 ENCOUNTER — PATIENT MESSAGE (OUTPATIENT)
Dept: INTERNAL MEDICINE | Facility: CLINIC | Age: 50
End: 2025-08-24
Payer: COMMERCIAL

## 2025-08-28 ENCOUNTER — TELEPHONE (OUTPATIENT)
Dept: INTERNAL MEDICINE | Facility: CLINIC | Age: 50
End: 2025-08-28
Payer: COMMERCIAL